# Patient Record
Sex: FEMALE | Race: BLACK OR AFRICAN AMERICAN | Employment: FULL TIME | URBAN - METROPOLITAN AREA
[De-identification: names, ages, dates, MRNs, and addresses within clinical notes are randomized per-mention and may not be internally consistent; named-entity substitution may affect disease eponyms.]

---

## 2017-11-26 ENCOUNTER — HOSPITAL ENCOUNTER (EMERGENCY)
Age: 37
Discharge: HOME | End: 2017-11-26
Attending: EMERGENCY MEDICINE
Payer: SELF-PAY

## 2017-11-26 ENCOUNTER — APPOINTMENT (OUTPATIENT)
Dept: CT IMAGING | Age: 37
End: 2017-11-26
Attending: EMERGENCY MEDICINE
Payer: SELF-PAY

## 2017-11-26 ENCOUNTER — APPOINTMENT (OUTPATIENT)
Dept: GENERAL RADIOLOGY | Age: 37
End: 2017-11-26
Attending: EMERGENCY MEDICINE
Payer: SELF-PAY

## 2017-11-26 VITALS
HEIGHT: 61 IN | WEIGHT: 195.33 LBS | DIASTOLIC BLOOD PRESSURE: 90 MMHG | HEART RATE: 63 BPM | RESPIRATION RATE: 15 BRPM | BODY MASS INDEX: 36.88 KG/M2 | OXYGEN SATURATION: 99 % | SYSTOLIC BLOOD PRESSURE: 149 MMHG | TEMPERATURE: 98.1 F

## 2017-11-26 DIAGNOSIS — I10 ESSENTIAL HYPERTENSION: Primary | ICD-10-CM

## 2017-11-26 DIAGNOSIS — R51.9 ACUTE NONINTRACTABLE HEADACHE, UNSPECIFIED HEADACHE TYPE: ICD-10-CM

## 2017-11-26 LAB
ALBUMIN SERPL-MCNC: 3.4 G/DL (ref 3.5–5)
ALBUMIN/GLOB SERPL: 0.9 {RATIO} (ref 1.1–2.2)
ALP SERPL-CCNC: 65 U/L (ref 45–117)
ALT SERPL-CCNC: 23 U/L (ref 12–78)
ANION GAP SERPL CALC-SCNC: 6 MMOL/L (ref 5–15)
AST SERPL-CCNC: 21 U/L (ref 15–37)
BASOPHILS # BLD: 0 K/UL (ref 0–0.1)
BASOPHILS NFR BLD: 0 % (ref 0–1)
BILIRUB SERPL-MCNC: 0.3 MG/DL (ref 0.2–1)
BUN SERPL-MCNC: 11 MG/DL (ref 6–20)
BUN/CREAT SERPL: 13 (ref 12–20)
CALCIUM SERPL-MCNC: 8.5 MG/DL (ref 8.5–10.1)
CHLORIDE SERPL-SCNC: 106 MMOL/L (ref 97–108)
CK SERPL-CCNC: 142 U/L (ref 26–192)
CO2 SERPL-SCNC: 29 MMOL/L (ref 21–32)
CREAT SERPL-MCNC: 0.83 MG/DL (ref 0.55–1.02)
EOSINOPHIL # BLD: 0.2 K/UL (ref 0–0.4)
EOSINOPHIL NFR BLD: 2 % (ref 0–7)
ERYTHROCYTE [DISTWIDTH] IN BLOOD BY AUTOMATED COUNT: 13.8 % (ref 11.5–14.5)
GLOBULIN SER CALC-MCNC: 3.7 G/DL (ref 2–4)
GLUCOSE SERPL-MCNC: 91 MG/DL (ref 65–100)
HCT VFR BLD AUTO: 37.1 % (ref 35–47)
HGB BLD-MCNC: 11.9 G/DL (ref 11.5–16)
LYMPHOCYTES # BLD: 2.1 K/UL (ref 0.8–3.5)
LYMPHOCYTES NFR BLD: 29 % (ref 12–49)
MCH RBC QN AUTO: 27.9 PG (ref 26–34)
MCHC RBC AUTO-ENTMCNC: 32.1 G/DL (ref 30–36.5)
MCV RBC AUTO: 87.1 FL (ref 80–99)
MONOCYTES # BLD: 0.5 K/UL (ref 0–1)
MONOCYTES NFR BLD: 6 % (ref 5–13)
NEUTS SEG # BLD: 4.5 K/UL (ref 1.8–8)
NEUTS SEG NFR BLD: 63 % (ref 32–75)
PLATELET # BLD AUTO: 265 K/UL (ref 150–400)
POTASSIUM SERPL-SCNC: 3.4 MMOL/L (ref 3.5–5.1)
PROT SERPL-MCNC: 7.1 G/DL (ref 6.4–8.2)
RBC # BLD AUTO: 4.26 M/UL (ref 3.8–5.2)
SODIUM SERPL-SCNC: 141 MMOL/L (ref 136–145)
TROPONIN I SERPL-MCNC: <0.04 NG/ML
WBC # BLD AUTO: 7.2 K/UL (ref 3.6–11)

## 2017-11-26 PROCEDURE — 96375 TX/PRO/DX INJ NEW DRUG ADDON: CPT

## 2017-11-26 PROCEDURE — 96374 THER/PROPH/DIAG INJ IV PUSH: CPT

## 2017-11-26 PROCEDURE — 74011250637 HC RX REV CODE- 250/637: Performed by: EMERGENCY MEDICINE

## 2017-11-26 PROCEDURE — 99284 EMERGENCY DEPT VISIT MOD MDM: CPT

## 2017-11-26 PROCEDURE — 93005 ELECTROCARDIOGRAM TRACING: CPT

## 2017-11-26 PROCEDURE — 36415 COLL VENOUS BLD VENIPUNCTURE: CPT | Performed by: EMERGENCY MEDICINE

## 2017-11-26 PROCEDURE — 80053 COMPREHEN METABOLIC PANEL: CPT | Performed by: EMERGENCY MEDICINE

## 2017-11-26 PROCEDURE — 84484 ASSAY OF TROPONIN QUANT: CPT | Performed by: EMERGENCY MEDICINE

## 2017-11-26 PROCEDURE — 71020 XR CHEST PA LAT: CPT

## 2017-11-26 PROCEDURE — 85025 COMPLETE CBC W/AUTO DIFF WBC: CPT | Performed by: EMERGENCY MEDICINE

## 2017-11-26 PROCEDURE — 82550 ASSAY OF CK (CPK): CPT | Performed by: EMERGENCY MEDICINE

## 2017-11-26 PROCEDURE — 70450 CT HEAD/BRAIN W/O DYE: CPT

## 2017-11-26 PROCEDURE — 74011250636 HC RX REV CODE- 250/636: Performed by: EMERGENCY MEDICINE

## 2017-11-26 RX ORDER — LISINOPRIL 10 MG/1
10 TABLET ORAL DAILY
Qty: 30 TAB | Refills: 0 | Status: SHIPPED | OUTPATIENT
Start: 2017-11-26 | End: 2017-12-26

## 2017-11-26 RX ORDER — METOCLOPRAMIDE HYDROCHLORIDE 5 MG/ML
10 INJECTION INTRAMUSCULAR; INTRAVENOUS
Status: COMPLETED | OUTPATIENT
Start: 2017-11-26 | End: 2017-11-26

## 2017-11-26 RX ORDER — KETOROLAC TROMETHAMINE 30 MG/ML
30 INJECTION, SOLUTION INTRAMUSCULAR; INTRAVENOUS
Status: COMPLETED | OUTPATIENT
Start: 2017-11-26 | End: 2017-11-26

## 2017-11-26 RX ORDER — LISINOPRIL 5 MG/1
10 TABLET ORAL
Status: COMPLETED | OUTPATIENT
Start: 2017-11-26 | End: 2017-11-26

## 2017-11-26 RX ADMIN — KETOROLAC TROMETHAMINE 30 MG: 30 INJECTION, SOLUTION INTRAMUSCULAR at 10:31

## 2017-11-26 RX ADMIN — METOCLOPRAMIDE 10 MG: 5 INJECTION, SOLUTION INTRAMUSCULAR; INTRAVENOUS at 10:31

## 2017-11-26 RX ADMIN — LISINOPRIL 10 MG: 5 TABLET ORAL at 12:41

## 2017-11-26 NOTE — ED PROVIDER NOTES
EMERGENCY DEPARTMENT HISTORY AND PHYSICAL EXAM      Date: 11/26/2017  Patient Name: Yany To    History of Presenting Illness     Chief Complaint   Patient presents with    Hypertension     History Provided By: Patient    HPI: Yany To, 40 y.o. female with PMHx significant for asthma and HTN, presents ambulatory to the ED with cc of elevated blood pressure x 1 week with associated symptoms of dizziness and postocular HA x 2 days as well as right eye blurred vision x 1 hour. Pt states that she has a hx of HTN, noting she followed up with her PCP on 11/20/17 for a routine follow up regarding her asthma hx at which time her PCP took her off Metoprolol and increased her dose of Amlodipine. Since then, pt states that she believes her blood pressure has been constantly elevated as she has been experiencing waxing and waning dizziness and a right postocular HA which she states is typical when her blood pressure is elevated. Additionally, she reports new onset right eye blurriness (no left eye blurriness) for which she was concerned, prompting ED visit. She notes additional symptom of SOB at baseline due to hx of asthma, noting she recently changed inhalers. She denies any hx of migraine HA's. She denies any black curtain vision changes, CP, congestion, sinus pressure, cough, or N/V/D.     PCP: Juanis Jang    Current Outpatient Prescriptions   Medication Sig Dispense Refill    lisinopril (PRINIVIL) 10 mg tablet Take 1 Tab by mouth daily for 30 days. 30 Tab 0    clindamycin (CLEOCIN) 300 mg capsule Take 1 Cap by mouth four (4) times daily. 40 Cap 0    predniSONE (STERAPRED DS) 10 mg dose pack Take as directed on taper package 21 Tab 0    lidocaine (LIDOCAINE VISCOUS) 2 % solution 5mL swish and gargle 4-6 times daily as needed x 3 days 1 Bottle 0    hydrochlorothiazide (HYDRODIURIL) 25 mg tablet Take 25 mg by mouth daily.       ranitidine (ZANTAC) 150 mg tablet Take 150 mg by mouth two (2) times a day. Past History     Past Medical History:  Past Medical History:   Diagnosis Date    Asthma     Hypertension        Past Surgical History:  Past Surgical History:   Procedure Laterality Date    HX GYN      ovarian cyst       Family History:  History reviewed. No pertinent family history. Social History:  Social History   Substance Use Topics    Smoking status: Current Every Day Smoker     Packs/day: 0.50    Smokeless tobacco: None    Alcohol use Yes      Comment: socially       Allergies: Allergies   Allergen Reactions    Percocet [Oxycodone-Acetaminophen] Itching    Sulfa (Sulfonamide Antibiotics) Itching    Vicodin [Hydrocodone-Acetaminophen] Itching         Review of Systems   Review of Systems   Constitutional: Negative. Negative for appetite change, chills, fatigue and fever. HENT: Negative. Negative for congestion, rhinorrhea, sinus pressure and sore throat. Eyes: Positive for visual disturbance. Respiratory: Positive for shortness of breath (baseline). Negative for cough, choking, chest tightness and wheezing. Cardiovascular: Negative for chest pain, palpitations and leg swelling. Positive for elevated blood pressure. Gastrointestinal: Negative for abdominal pain, constipation, diarrhea, nausea and vomiting. Endocrine: Negative. Genitourinary: Negative. Negative for difficulty urinating, dysuria, flank pain and urgency. Musculoskeletal: Negative. Skin: Negative. Neurological: Positive for dizziness and headaches (postocular). Negative for speech difficulty, weakness, light-headedness and numbness. Psychiatric/Behavioral: Negative. All other systems reviewed and are negative. Physical Exam   Physical Exam   Constitutional: She is oriented to person, place, and time. She appears well-developed and well-nourished. No distress. HENT:   Head: Normocephalic and atraumatic.    Mouth/Throat: Oropharynx is clear and moist. No oropharyngeal exudate. Eyes: Conjunctivae and EOM are normal. Pupils are equal, round, and reactive to light. Neck: Normal range of motion. Neck supple. No JVD present. No tracheal deviation present. Cardiovascular: Normal rate, regular rhythm, normal heart sounds and intact distal pulses. No murmur heard. Pulmonary/Chest: Effort normal and breath sounds normal. No stridor. No respiratory distress. She has no wheezes. She has no rales. She exhibits no tenderness. Abdominal: Soft. She exhibits no distension. There is no tenderness. There is no rebound and no guarding. Musculoskeletal: Normal range of motion. She exhibits no edema or tenderness. Neurological: She is alert and oriented to person, place, and time. No cranial nerve deficit. No focal motor or sensory deficits, no dysmetria, normal heel shin    Skin: Skin is warm and dry. She is not diaphoretic. Psychiatric: She has a normal mood and affect. Her behavior is normal.   Nursing note and vitals reviewed.         Diagnostic Study Results     Labs -  Recent Results (from the past 12 hour(s))   EKG, 12 LEAD, INITIAL    Collection Time: 11/26/17  9:42 AM   Result Value Ref Range    Ventricular Rate 60 BPM    Atrial Rate 60 BPM    P-R Interval 186 ms    QRS Duration 88 ms    Q-T Interval 430 ms    QTC Calculation (Bezet) 430 ms    Calculated P Axis 23 degrees    Calculated R Axis 92 degrees    Calculated T Axis 32 degrees    Diagnosis       Normal sinus rhythm  Rightward axis  Nonspecific T wave abnormality  Abnormal ECG     CBC WITH AUTOMATED DIFF    Collection Time: 11/26/17 10:12 AM   Result Value Ref Range    WBC 7.2 3.6 - 11.0 K/uL    RBC 4.26 3.80 - 5.20 M/uL    HGB 11.9 11.5 - 16.0 g/dL    HCT 37.1 35.0 - 47.0 %    MCV 87.1 80.0 - 99.0 FL    MCH 27.9 26.0 - 34.0 PG    MCHC 32.1 30.0 - 36.5 g/dL    RDW 13.8 11.5 - 14.5 %    PLATELET 720 331 - 192 K/uL    NEUTROPHILS 63 32 - 75 %    LYMPHOCYTES 29 12 - 49 %    MONOCYTES 6 5 - 13 %    EOSINOPHILS 2 0 - 7 % BASOPHILS 0 0 - 1 %    ABS. NEUTROPHILS 4.5 1.8 - 8.0 K/UL    ABS. LYMPHOCYTES 2.1 0.8 - 3.5 K/UL    ABS. MONOCYTES 0.5 0.0 - 1.0 K/UL    ABS. EOSINOPHILS 0.2 0.0 - 0.4 K/UL    ABS. BASOPHILS 0.0 0.0 - 0.1 K/UL   CK W/ REFLX CKMB    Collection Time: 11/26/17 10:12 AM   Result Value Ref Range     26 - 192 U/L   TROPONIN I    Collection Time: 11/26/17 10:12 AM   Result Value Ref Range    Troponin-I, Qt. <0.04 <5.58 ng/mL   METABOLIC PANEL, COMPREHENSIVE    Collection Time: 11/26/17 10:12 AM   Result Value Ref Range    Sodium 141 136 - 145 mmol/L    Potassium 3.4 (L) 3.5 - 5.1 mmol/L    Chloride 106 97 - 108 mmol/L    CO2 29 21 - 32 mmol/L    Anion gap 6 5 - 15 mmol/L    Glucose 91 65 - 100 mg/dL    BUN 11 6 - 20 MG/DL    Creatinine 0.83 0.55 - 1.02 MG/DL    BUN/Creatinine ratio 13 12 - 20      GFR est AA >60 >60 ml/min/1.73m2    GFR est non-AA >60 >60 ml/min/1.73m2    Calcium 8.5 8.5 - 10.1 MG/DL    Bilirubin, total 0.3 0.2 - 1.0 MG/DL    ALT (SGPT) 23 12 - 78 U/L    AST (SGOT) 21 15 - 37 U/L    Alk. phosphatase 65 45 - 117 U/L    Protein, total 7.1 6.4 - 8.2 g/dL    Albumin 3.4 (L) 3.5 - 5.0 g/dL    Globulin 3.7 2.0 - 4.0 g/dL    A-G Ratio 0.9 (L) 1.1 - 2.2         Radiologic Studies -   CT Results  (Last 48 hours)               11/26/17 1044  CT HEAD WO CONT Final result    Impression:  IMPRESSION: No acute intracranial hemorrhage, mass or infarct. Narrative:  INDICATION:   Headache, dizziness. Exam: Noncontrast CT of the brain is performed with 5 mm collimation. CT dose reduction was achieved with the use of the standardized protocol   tailored for this examination and automatic exposure control for dose   modulation. Direct comparison is made to prior CT dated January 2012. FINDINGS: There is no acute intracranial hemorrhage, mass, mass effect or   herniation. Ventricular system is normal. The gray-white matter differentiation   is well-preserved.  The mastoid air cells are well pneumatized. The visualized   paranasal sinuses are normal.               CXR Results  (Last 48 hours)               11/26/17 1027  XR CHEST PA LAT Final result    Impression:  IMPRESSION: No acute cardiopulmonary disease. Narrative: Indication: Hypertension. Exam: PA and lateral views of the chest.       There is no prior study for direct comparison. Findings: Cardiomediastinal silhouette is within normal limits. Lungs are clear   bilaterally. Pleural spaces are normal. Osseous structures are intact. Medical Decision Making   I am the first provider for this patient. I reviewed the vital signs, available nursing notes, past medical history, past surgical history, family history and social history. Vital Signs-Reviewed the patient's vital signs. Patient Vitals for the past 12 hrs:   Temp Pulse Resp BP SpO2   11/26/17 1200 - 63 15 149/90 99 %   11/26/17 1120 - 88 24 138/86 97 %   11/26/17 1026 - - - (!) 175/100 100 %   11/26/17 0922 98.1 °F (36.7 °C) 80 22 (!) 179/129 -       Pulse Oximetry Analysis - 98% on RA    Cardiac Monitor:   Rate: 65 bpm  Rhythm: Normal Sinus Rhythm     Records Reviewed: Nursing Notes and Old Medical Records    Provider Notes (Medical Decision Making):   DDx: accelerated HTN, head bleed, electrolyte abnormality, renal failure, ACS    ED Course:   12:23 PM  I have reviewed discharge instructions with the patient and explained medications that she is being discharged with. The patient verbalized understanding and agrees with plan. EKG: NSR, right hope axis, rate 60, normal pr/qrs, NSST, Rampart Carson, DO    Disposition:  Discharge Note:  12:23 PM  The patient has been re-evaluated and is ready for discharge. Reviewed available results with patient. Counseled patient on diagnosis and care plan. Patient has expressed understanding, and all questions have been answered.  Patient agrees with plan and agrees to follow up as recommended, or return to the ED if their symptoms worsen. Discharge instructions have been provided and explained to the patient, along with reasons to return to the ED. Pt BP slightly elevated. Pt HR in 60's, hesitant to restart BB, will give pt dose of Lisinopril here in the ED, pt to monitor BP next 24hrs, script written for Lisinopril if BP improves and does not drop to low, Bc Blanco DO      PLAN:  1. Current Discharge Medication List      START taking these medications    Details   lisinopril (PRINIVIL) 10 mg tablet Take 1 Tab by mouth daily for 30 days. Qty: 30 Tab, Refills: 0         STOP taking these medications       metoprolol succinate (TOPROL-XL) 25 mg XL tablet Comments:   Reason for Stoppin.   Follow-up Information     Follow up With Details Comments [address]  As needed Junior Guillen  [phone]          Return to ED if worse     Diagnosis     Clinical Impression:   1. Essential hypertension    2. Acute nonintractable headache, unspecified headache type        Attestations: This note is prepared by Serafin Gray, acting as Scribe for Marlon Calderon, [address] [place] [address]: The scribe's documentation has been prepared under my direction and personally reviewed by me in its entirety. I confirm that the note above accurately reflects all work, treatment, procedures, and medical decision making performed by me.

## 2017-11-26 NOTE — ED NOTES
Assumed care of patient. Patient placed in position of comfort. Call bell in reach. Skin warm and dry. Respirations even and unlabored. In no apparent distress at this time. Pt presents ambulatory into the ED with c/o HTN x about 1 week, dizziness x 2 days, blurred vision in Rt eye and headache x this am. Seen at her PCP'c office early last week for a routine visit; was found to be hypertensive and states, \"they took me off Metoprolol and increased my Amlodipine dose. \" Came into the ED today d/t headache and blurred vision in Rt eye x 1 hour pta. Denies CP,slurred speech and extremity weakness. A&O x 4 and interacting appropriately. Placed on cardiac monitor x 3.

## 2017-11-26 NOTE — ED NOTES
Dr. Yan Llamas &CHAGO have reviewed discharge instructions with the patient. The patient verbalized understanding. Pt. Refused wheelchair out. Pt. Ambulatory out of ED with steady gait.

## 2017-11-26 NOTE — ED NOTES
Bedside and Verbal shift change report given to Hernan Ruffin RN (oncoming nurse) by this RN (offgoing nurse). Report included the following information SBAR.

## 2017-11-26 NOTE — DISCHARGE INSTRUCTIONS
Headache: Care Instructions  Your Care Instructions    Headaches have many possible causes. Most headaches aren't a sign of a more serious problem, and they will get better on their own. Home treatment may help you feel better faster. The doctor has checked you carefully, but problems can develop later. If you notice any problems or new symptoms, get medical treatment right away. Follow-up care is a key part of your treatment and safety. Be sure to make and go to all appointments, and call your doctor if you are having problems. It's also a good idea to know your test results and keep a list of the medicines you take. How can you care for yourself at home? · Do not drive if you have taken a prescription pain medicine. · Rest in a quiet, dark room until your headache is gone. Close your eyes and try to relax or go to sleep. Don't watch TV or read. · Put a cold, moist cloth or cold pack on the painful area for 10 to 20 minutes at a time. Put a thin cloth between the cold pack and your skin. · Use a warm, moist towel or a heating pad set on low to relax tight shoulder and neck muscles. · Have someone gently massage your neck and shoulders. · Take pain medicines exactly as directed. ¨ If the doctor gave you a prescription medicine for pain, take it as prescribed. ¨ If you are not taking a prescription pain medicine, ask your doctor if you can take an over-the-counter medicine. · Be careful not to take pain medicine more often than the instructions allow, because you may get worse or more frequent headaches when the medicine wears off. · Do not ignore new symptoms that occur with a headache, such as a fever, weakness or numbness, vision changes, or confusion. These may be signs of a more serious problem. To prevent headaches  · Keep a headache diary so you can figure out what triggers your headaches. Avoiding triggers may help you prevent headaches.  Record when each headache began, how long it lasted, and what the pain was like (throbbing, aching, stabbing, or dull). Write down any other symptoms you had with the headache, such as nausea, flashing lights or dark spots, or sensitivity to bright light or loud noise. Note if the headache occurred near your period. List anything that might have triggered the headache, such as certain foods (chocolate, cheese, wine) or odors, smoke, bright light, stress, or lack of sleep. · Find healthy ways to deal with stress. Headaches are most common during or right after stressful times. Take time to relax before and after you do something that has caused a headache in the past.  · Try to keep your muscles relaxed by keeping good posture. Check your jaw, face, neck, and shoulder muscles for tension, and try relaxing them. When sitting at a desk, change positions often, and stretch for 30 seconds each hour. · Get plenty of sleep and exercise. · Eat regularly and well. Long periods without food can trigger a headache. · Treat yourself to a massage. Some people find that regular massages are very helpful in relieving tension. · Limit caffeine by not drinking too much coffee, tea, or soda. But don't quit caffeine suddenly, because that can also give you headaches. · Reduce eyestrain from computers by blinking frequently and looking away from the computer screen every so often. Make sure you have proper eyewear and that your monitor is set up properly, about an arm's length away. · Seek help if you have depression or anxiety. Your headaches may be linked to these conditions. Treatment can both prevent headaches and help with symptoms of anxiety or depression. When should you call for help? Call 911 anytime you think you may need emergency care. For example, call if:  ? · You have signs of a stroke. These may include:  ¨ Sudden numbness, paralysis, or weakness in your face, arm, or leg, especially on only one side of your body. ¨ Sudden vision changes.   ¨ Sudden trouble speaking. ¨ Sudden confusion or trouble understanding simple statements. ¨ Sudden problems with walking or balance. ¨ A sudden, severe headache that is different from past headaches. ?Call your doctor now or seek immediate medical care if:  ? · You have a new or worse headache. ? · Your headache gets much worse. Where can you learn more? Go to http://rosemary-marisela.info/. Enter M271 in the search box to learn more about \"Headache: Care Instructions. \"  Current as of: October 14, 2016  Content Version: 11.4  © 3836-3980 [employer]. Care instructions adapted under license by [employer] (which disclaims liability or warranty for this information). If you have questions about a medical condition or this instruction, always ask your healthcare professional. [address] any warranty or liability for your use of this information. High Blood Pressure: Care Instructions  Your Care Instructions    If your blood pressure is usually above 140/90, you have high blood pressure, or hypertension. That means the top number is 140 or higher or the bottom number is 90 or higher, or both. Despite what a lot of people think, high blood pressure usually doesn't cause headaches or make you feel dizzy or lightheaded. It usually has no symptoms. But it does increase your risk for heart attack, stroke, and kidney or eye damage. The higher your blood pressure, the more your risk increases. Your doctor will give you a goal for your blood pressure. Your goal will be based on your health and your age. An example of a goal is to keep your blood pressure below 140/90. Lifestyle changes, such as eating healthy and being active, are always important to help lower blood pressure. You might also take medicine to reach your blood pressure goal.  Follow-up care is a key part of your treatment and safety.  Be sure to make and go to all appointments, and call your doctor if you are having problems. It's also a good idea to know your test results and keep a list of the medicines you take. How can you care for yourself at home? Medical treatment  · If you stop taking your medicine, your blood pressure will go back up. You may take one or more types of medicine to lower your blood pressure. Be safe with medicines. Take your medicine exactly as prescribed. Call your doctor if you think you are having a problem with your medicine. · Talk to your doctor before you start taking aspirin every day. Aspirin can help certain people lower their risk of a heart attack or stroke. But taking aspirin isn't right for everyone, because it can cause serious bleeding. · See your doctor regularly. You may need to see the doctor more often at first or until your blood pressure comes down. · If you are taking blood pressure medicine, talk to your doctor before you take decongestants or anti-inflammatory medicine, such as ibuprofen. Some of these medicines can raise blood pressure. · Learn how to check your blood pressure at home. Lifestyle changes  · Stay at a healthy weight. This is especially important if you put on weight around the waist. Losing even 10 pounds can help you lower your blood pressure. · If your doctor recommends it, get more exercise. Walking is a good choice. Bit by bit, increase the amount you walk every day. Try for at least 30 minutes on most days of the week. You also may want to swim, bike, or do other activities. · Avoid or limit alcohol. Talk to your doctor about whether you can drink any alcohol. · Try to limit how much sodium you eat to less than 2,300 milligrams (mg) a day. Your doctor may ask you to try to eat less than 1,500 mg a day. · Eat plenty of fruits (such as bananas and oranges), vegetables, legumes, whole grains, and low-fat dairy products. · Lower the amount of saturated fat in your diet.  Saturated fat is found in animal products such as milk, cheese, and meat. Limiting these foods may help you lose weight and also lower your risk for heart disease. · Do not smoke. Smoking increases your risk for heart attack and stroke. If you need help quitting, talk to your doctor about stop-smoking programs and medicines. These can increase your chances of quitting for good. When should you call for help? Call 911 anytime you think you may need emergency care. This may mean having symptoms that suggest that your blood pressure is causing a serious heart or blood vessel problem. Your blood pressure may be over 180/110. ? For example, call 911 if:  ? · You have symptoms of a heart attack. These may include:  ¨ Chest pain or pressure, or a strange feeling in the chest.  ¨ Sweating. ¨ Shortness of breath. ¨ Nausea or vomiting. ¨ Pain, pressure, or a strange feeling in the back, neck, jaw, or upper belly or in one or both shoulders or arms. ¨ Lightheadedness or sudden weakness. ¨ A fast or irregular heartbeat. ? · You have symptoms of a stroke. These may include:  ¨ Sudden numbness, tingling, weakness, or loss of movement in your face, arm, or leg, especially on only one side of your body. ¨ Sudden vision changes. ¨ Sudden trouble speaking. ¨ Sudden confusion or trouble understanding simple statements. ¨ Sudden problems with walking or balance. ¨ A sudden, severe headache that is different from past headaches. ? · You have severe back or belly pain. ?Do not wait until your blood pressure comes down on its own. Get help right away. ?Call your doctor now or seek immediate care if:  ? · Your blood pressure is much higher than normal (such as 180/110 or higher), but you don't have symptoms. ? · You think high blood pressure is causing symptoms, such as:  ¨ Severe headache. ¨ Blurry vision. ? Watch closely for changes in your health, and be sure to contact your doctor if:  ? · Your blood pressure measures 140/90 or higher at least 2 times.  That means the top number is 140 or higher or the bottom number is 90 or higher, or both. ? · You think you may be having side effects from your blood pressure medicine. ? · Your blood pressure is usually normal, but it goes above normal at least 2 times. Where can you learn more? Go to http://rosemary-marisela.info/. Enter V661 in the search box to learn more about \"High Blood Pressure: Care Instructions. \"  Current as of: September 21, 2016  Content Version: 11.4  © 8891-0801 [employer]. Care instructions adapted under license by [employer] (which disclaims liability or warranty for this information). If you have questions about a medical condition or this instruction, always ask your healthcare professional. [address] any warranty or liability for your use of this information.

## 2017-11-26 NOTE — ED NOTES
Encouraged pt. To follow up with PCP by calling tomorrow for an appointment. Education on HTN and causes and ways to monitor b/p. Pt. Verbalized understanding.

## 2017-11-27 LAB
ATRIAL RATE: 60 BPM
CALCULATED P AXIS, ECG09: 23 DEGREES
CALCULATED R AXIS, ECG10: 92 DEGREES
CALCULATED T AXIS, ECG11: 32 DEGREES
DIAGNOSIS, 93000: NORMAL
P-R INTERVAL, ECG05: 186 MS
Q-T INTERVAL, ECG07: 430 MS
QRS DURATION, ECG06: 88 MS
QTC CALCULATION (BEZET), ECG08: 430 MS
VENTRICULAR RATE, ECG03: 60 BPM

## 2018-03-04 ENCOUNTER — APPOINTMENT (OUTPATIENT)
Dept: CT IMAGING | Age: 38
End: 2018-03-04
Attending: EMERGENCY MEDICINE
Payer: SELF-PAY

## 2018-03-04 ENCOUNTER — HOSPITAL ENCOUNTER (EMERGENCY)
Age: 38
Discharge: HOME | End: 2018-03-05
Attending: EMERGENCY MEDICINE | Admitting: EMERGENCY MEDICINE
Payer: SELF-PAY

## 2018-03-04 VITALS
HEIGHT: 61 IN | HEART RATE: 81 BPM | BODY MASS INDEX: 37.21 KG/M2 | RESPIRATION RATE: 19 BRPM | OXYGEN SATURATION: 94 % | TEMPERATURE: 98.2 F | DIASTOLIC BLOOD PRESSURE: 88 MMHG | SYSTOLIC BLOOD PRESSURE: 148 MMHG | WEIGHT: 197.09 LBS

## 2018-03-04 DIAGNOSIS — R51.9 FRONTAL HEADACHE: Primary | ICD-10-CM

## 2018-03-04 DIAGNOSIS — R09.81 SINUS CONGESTION: ICD-10-CM

## 2018-03-04 LAB
APPEARANCE UR: CLEAR
BACTERIA URNS QL MICRO: NEGATIVE /HPF
BASOPHILS # BLD: 0 K/UL (ref 0–0.1)
BASOPHILS NFR BLD: 0 % (ref 0–1)
BILIRUB UR QL: NEGATIVE
CK MB CFR SERPL CALC: 0.9 % (ref 0–2.5)
CK MB SERPL-MCNC: 1.3 NG/ML (ref 5–25)
CK SERPL-CCNC: 142 U/L (ref 26–192)
COLOR UR: NORMAL
DIFFERENTIAL METHOD BLD: ABNORMAL
EOSINOPHIL # BLD: 0.4 K/UL (ref 0–0.4)
EOSINOPHIL NFR BLD: 4 % (ref 0–7)
EPITH CASTS URNS QL MICRO: NORMAL /LPF
ERYTHROCYTE [DISTWIDTH] IN BLOOD BY AUTOMATED COUNT: 13.8 % (ref 11.5–14.5)
ERYTHROCYTE [SEDIMENTATION RATE] IN BLOOD: 17 MM/HR (ref 0–20)
GLUCOSE UR STRIP.AUTO-MCNC: NEGATIVE MG/DL
HCT VFR BLD AUTO: 37.7 % (ref 35–47)
HGB BLD-MCNC: 11.8 G/DL (ref 11.5–16)
HGB UR QL STRIP: NEGATIVE
HYALINE CASTS URNS QL MICRO: NORMAL /LPF (ref 0–5)
IMM GRANULOCYTES # BLD: 0 K/UL (ref 0–0.04)
IMM GRANULOCYTES NFR BLD AUTO: 0 % (ref 0–0.5)
KETONES UR QL STRIP.AUTO: NEGATIVE MG/DL
LEUKOCYTE ESTERASE UR QL STRIP.AUTO: NEGATIVE
LYMPHOCYTES # BLD: 2.3 K/UL (ref 0.8–3.5)
LYMPHOCYTES NFR BLD: 25 % (ref 12–49)
MCH RBC QN AUTO: 27.2 PG (ref 26–34)
MCHC RBC AUTO-ENTMCNC: 31.3 G/DL (ref 30–36.5)
MCV RBC AUTO: 86.9 FL (ref 80–99)
MONOCYTES # BLD: 0.4 K/UL (ref 0–1)
MONOCYTES NFR BLD: 4 % (ref 5–13)
NEUTS SEG # BLD: 6.3 K/UL (ref 1.8–8)
NEUTS SEG NFR BLD: 67 % (ref 32–75)
NITRITE UR QL STRIP.AUTO: NEGATIVE
NRBC # BLD: 0 K/UL (ref 0–0.01)
NRBC BLD-RTO: 0 PER 100 WBC
PH UR STRIP: 6.5 [PH] (ref 5–8)
PLATELET # BLD AUTO: 271 K/UL (ref 150–400)
PMV BLD AUTO: 10.5 FL (ref 8.9–12.9)
PROT UR STRIP-MCNC: NEGATIVE MG/DL
RBC # BLD AUTO: 4.34 M/UL (ref 3.8–5.2)
RBC #/AREA URNS HPF: NORMAL /HPF (ref 0–5)
SP GR UR REFRACTOMETRY: 1.02 (ref 1–1.03)
UA: UC IF INDICATED,UAUC: NORMAL
UROBILINOGEN UR QL STRIP.AUTO: 0.2 EU/DL (ref 0.2–1)
WBC # BLD AUTO: 9.5 K/UL (ref 3.6–11)
WBC URNS QL MICRO: NORMAL /HPF (ref 0–4)

## 2018-03-04 PROCEDURE — 82550 ASSAY OF CK (CPK): CPT | Performed by: EMERGENCY MEDICINE

## 2018-03-04 PROCEDURE — 99285 EMERGENCY DEPT VISIT HI MDM: CPT

## 2018-03-04 PROCEDURE — 74011250637 HC RX REV CODE- 250/637: Performed by: EMERGENCY MEDICINE

## 2018-03-04 PROCEDURE — 81001 URINALYSIS AUTO W/SCOPE: CPT | Performed by: EMERGENCY MEDICINE

## 2018-03-04 PROCEDURE — 96375 TX/PRO/DX INJ NEW DRUG ADDON: CPT

## 2018-03-04 PROCEDURE — 70450 CT HEAD/BRAIN W/O DYE: CPT

## 2018-03-04 PROCEDURE — 85025 COMPLETE CBC W/AUTO DIFF WBC: CPT | Performed by: EMERGENCY MEDICINE

## 2018-03-04 PROCEDURE — 36415 COLL VENOUS BLD VENIPUNCTURE: CPT | Performed by: EMERGENCY MEDICINE

## 2018-03-04 PROCEDURE — 96374 THER/PROPH/DIAG INJ IV PUSH: CPT

## 2018-03-04 PROCEDURE — 85652 RBC SED RATE AUTOMATED: CPT | Performed by: EMERGENCY MEDICINE

## 2018-03-04 PROCEDURE — 74011250636 HC RX REV CODE- 250/636: Performed by: EMERGENCY MEDICINE

## 2018-03-04 RX ORDER — LISINOPRIL 20 MG/1
TABLET ORAL DAILY
COMMUNITY
End: 2019-02-05

## 2018-03-04 RX ORDER — FEXOFENADINE HCL AND PSEUDOEPHEDRINE HCI 60; 120 MG/1; MG/1
1 TABLET, EXTENDED RELEASE ORAL EVERY 12 HOURS
Qty: 10 TAB | Refills: 0 | Status: SHIPPED | OUTPATIENT
Start: 2018-03-04 | End: 2019-01-04

## 2018-03-04 RX ORDER — KETOROLAC TROMETHAMINE 30 MG/ML
15 INJECTION, SOLUTION INTRAMUSCULAR; INTRAVENOUS
Status: COMPLETED | OUTPATIENT
Start: 2018-03-04 | End: 2018-03-04

## 2018-03-04 RX ORDER — BUTALBITAL, ACETAMINOPHEN AND CAFFEINE 50; 325; 40 MG/1; MG/1; MG/1
2 TABLET ORAL
Status: COMPLETED | OUTPATIENT
Start: 2018-03-04 | End: 2018-03-04

## 2018-03-04 RX ORDER — DEXAMETHASONE SODIUM PHOSPHATE 4 MG/ML
10 INJECTION, SOLUTION INTRA-ARTICULAR; INTRALESIONAL; INTRAMUSCULAR; INTRAVENOUS; SOFT TISSUE
Status: COMPLETED | OUTPATIENT
Start: 2018-03-04 | End: 2018-03-04

## 2018-03-04 RX ORDER — BUTALBITAL, ACETAMINOPHEN AND CAFFEINE 300; 40; 50 MG/1; MG/1; MG/1
1 CAPSULE ORAL
Qty: 10 CAP | Refills: 0 | Status: SHIPPED | OUTPATIENT
Start: 2018-03-04 | End: 2019-01-04

## 2018-03-04 RX ADMIN — SODIUM CHLORIDE 500 ML: 900 INJECTION, SOLUTION INTRAVENOUS at 22:33

## 2018-03-04 RX ADMIN — KETOROLAC TROMETHAMINE 15 MG: 30 INJECTION, SOLUTION INTRAMUSCULAR at 23:11

## 2018-03-04 RX ADMIN — DEXAMETHASONE SODIUM PHOSPHATE 10 MG: 4 INJECTION, SOLUTION INTRAMUSCULAR; INTRAVENOUS at 22:34

## 2018-03-04 RX ADMIN — BUTALBITAL, ACETAMINOPHEN AND CAFFEINE 2 TABLET: 50; 325; 40 TABLET ORAL at 23:52

## 2018-03-04 NOTE — LETTER
[address] 
[facility] EMERGENCY DEPT 
[address] [place] [address] [id number] [phone] Work/School Note Date: 3/4/2018 To Whom It May concern: Debra Terry was seen and treated today in the emergency room by the following provider(s): 
Attending Provider: Priscila James MD. Debra Terry may return to work on 3/6/18.  
 
Sincerely, 
 
 
 
 
Priscila James MD

## 2018-03-05 NOTE — ED NOTES
Assumed care of pt from triage. Pt complaining of headache x3 days. Pt c/o congestion and has been taking mucinex. Pt describes pressure in face/sinuses and ache in head. Pt in no acute distress at this time. MD at bedside. Call bell within reach.

## 2018-03-05 NOTE — DISCHARGE INSTRUCTIONS
Head or Face Pain: Care Instructions  Your Care Instructions    Common causes of head or face pain are allergies, stress, and injuries. Other causes include tooth problems and sinus infections. Eating certain foods, such as chocolate or cheese, or drinking certain liquids, such as coffee or cola, can cause head pain for some people. If you have mild head pain, you may not need treatment. It is important to watch your symptoms and talk to your doctor if your pain continues or gets worse. Follow-up care is a key part of your treatment and safety. Be sure to make and go to all appointments, and call your doctor if you are having problems. It's also a good idea to know your test results and keep a list of the medicines you take. How can you care for yourself at home? · Take pain medicines exactly as directed. ¨ If the doctor gave you a prescription medicine for pain, take it as prescribed. ¨ If you are not taking a prescription pain medicine, ask your doctor if you can take an over-the-counter pain medicine. · Take it easy for the next few days or longer if you are not feeling well. · Use a warm, moist towel or heating pad set on low to relax tight muscles in your shoulder and neck. Have someone gently massage your neck and shoulders. · Put ice or a cold pack on the area for 10 to 20 minutes at a time. Put a thin cloth between the ice and your skin. When should you call for help? Call 911 anytime you think you may need emergency care. For example, call if:  ? · You have twitching, jerking, or a seizure. ? · You passed out (lost consciousness). ? · You have symptoms of a stroke. These may include:  ¨ Sudden numbness, tingling, weakness, or loss of movement in your face, arm, or leg, especially on only one side of your body. ¨ Sudden vision changes. ¨ Sudden trouble speaking. ¨ Sudden confusion or trouble understanding simple statements. ¨ Sudden problems with walking or balance.   ¨ A sudden, severe headache that is different from past headaches. ? · You have jaw pain and pain in your chest, shoulder, neck, or arm. ?Call your doctor now or seek immediate medical care if:  ? · You have a fever with a stiff neck or a severe headache. ? · You have nausea and vomiting, or you cannot keep food or liquids down. ? Watch closely for changes in your health, and be sure to contact your doctor if:  ? · Your head or face pain does not get better as expected. Where can you learn more? Go to http://rosemary-marisela.info/. Enter P568 in the search box to learn more about \"Head or Face Pain: Care Instructions. \"  Current as of: March 20, 2017  Content Version: 11.4  © 2890-8352 [employer]. Care instructions adapted under license by [employer] (which disclaims liability or warranty for this information). If you have questions about a medical condition or this instruction, always ask your healthcare professional. [place] [address] any warranty or liability for your use of this information.

## 2018-03-05 NOTE — ED PROVIDER NOTES
EMERGENCY DEPARTMENT HISTORY AND PHYSICAL EXAM      Date: 3/4/2018  Patient Name: Hattie Collins    History of Presenting Illness     Chief Complaint   Patient presents with    Headache     Ambulatory with steady gait to triage. Reporting began getting a headache about 2 days ago. Gradually came on and is now a 10/10 aching anteriorly. Has attempted to treat without relief. Reporting some sensitivity to light. History Provided By: Patient    HPI: Hattie Collins is a 40 y.o. female, pmhx HTN and Asthma, who presents ambulatory to the ED c/o pressuring nasal congestion and headache x3 days. Pt reports associated productive cough with secondary chest pain and photophobia. She also notes her cough exacerbates her headache. Pt reports taking Sinus Mucinex Max Strength with no relief of her sxs. Pt specifically denies any recent fevers, chills, nausea, vomiting, diarrhea, abd pain, SOB, urinary sxs, or changes in BM. PCP: Toribio Mcghee MD    PMHx: Significant for HTN and Asthma  PSHx: Significant for Ovarian cyst  Social Hx: +tobacco (.5PPD), -EtOH, -Illicit Drugs    There are no other complaints, changes, or physical findings at this time. Current Outpatient Prescriptions   Medication Sig Dispense Refill    lisinopril (PRINIVIL, ZESTRIL) 20 mg tablet Take  by mouth daily.  loratadine 10 mg cap Take  by mouth daily.  butalbital-acetaminophen-caff (FIORICET) -40 mg per capsule Take 1 Cap by mouth every six (6) hours as needed for Pain. 10 Cap 0    fexofenadine-pseudoephedrine (ALLEGRA-D 12 HOUR)  mg Tb12 Take 1 Tab by mouth every twelve (12) hours. 10 Tab 0    hydrochlorothiazide (HYDRODIURIL) 25 mg tablet Take 25 mg by mouth daily.  ranitidine (ZANTAC) 150 mg tablet Take 150 mg by mouth two (2) times a day.  clindamycin (CLEOCIN) 300 mg capsule Take 1 Cap by mouth four (4) times daily.  40 Cap 0    predniSONE (STERAPRED DS) 10 mg dose pack Take as directed on taper package 21 Tab 0    lidocaine (LIDOCAINE VISCOUS) 2 % solution 5mL swish and gargle 4-6 times daily as needed x 3 days 1 Bottle 0       Past History     Past Medical History:  Past Medical History:   Diagnosis Date    Asthma     Hypertension        Past Surgical History:  Past Surgical History:   Procedure Laterality Date    HX GYN      ovarian cyst       Family History:  No family history on file. Social History:  Social History   Substance Use Topics    Smoking status: Current Every Day Smoker     Packs/day: 0.50    Smokeless tobacco: Not on file    Alcohol use Yes      Comment: socially       Allergies: Allergies   Allergen Reactions    Amlodipine Other (comments)     Pt states it makes her BP higher    Percocet [Oxycodone-Acetaminophen] Itching    Sulfa (Sulfonamide Antibiotics) Itching    Vicodin [Hydrocodone-Acetaminophen] Itching         Review of Systems   Review of Systems   Constitutional: Negative. Negative for fever. HENT: Positive for congestion. Eyes: Positive for photophobia. Respiratory: Positive for cough. Negative for shortness of breath. Cardiovascular: Positive for chest pain. Gastrointestinal: Negative for abdominal pain, nausea and vomiting. Endocrine: Negative. Genitourinary: Negative. Negative for difficulty urinating, dysuria and hematuria. Musculoskeletal: Negative. Skin: Negative. Allergic/Immunologic: Negative. Neurological: Positive for headaches. Psychiatric/Behavioral: Negative for suicidal ideas. All other systems reviewed and are negative. Physical Exam   Physical Exam   Constitutional: She is oriented to person, place, and time. She appears well-developed and well-nourished. No distress. HENT:   Head: Normocephalic and atraumatic. Nose: Nose normal.   Eyes: Conjunctivae and EOM are normal. No scleral icterus. Neck: Normal range of motion. No tracheal deviation present.    Cardiovascular: Normal rate, regular rhythm, normal heart sounds and intact distal pulses. Exam reveals no friction rub. No murmur heard. Pulmonary/Chest: Effort normal and breath sounds normal. No stridor. No respiratory distress. She has no wheezes. She has no rales. Abdominal: Soft. Bowel sounds are normal. She exhibits no distension. There is no tenderness. There is no rebound. Musculoskeletal: Normal range of motion. She exhibits no tenderness. Neurological: She is alert and oriented to person, place, and time. No cranial nerve deficit. Skin: Skin is warm and dry. No rash noted. She is not diaphoretic. Psychiatric: She has a normal mood and affect. Her speech is normal and behavior is normal. Judgment and thought content normal. Cognition and memory are normal.   Nursing note and vitals reviewed. Diagnostic Study Results     Labs -     Recent Results (from the past 12 hour(s))   CBC WITH AUTOMATED DIFF    Collection Time: 03/04/18 10:12 PM   Result Value Ref Range    WBC 9.5 3.6 - 11.0 K/uL    RBC 4.34 3.80 - 5.20 M/uL    HGB 11.8 11.5 - 16.0 g/dL    HCT 37.7 35.0 - 47.0 %    MCV 86.9 80.0 - 99.0 FL    MCH 27.2 26.0 - 34.0 PG    MCHC 31.3 30.0 - 36.5 g/dL    RDW 13.8 11.5 - 14.5 %    PLATELET 081 826 - 510 K/uL    MPV 10.5 8.9 - 12.9 FL    NRBC 0.0 0  WBC    ABSOLUTE NRBC 0.00 0.00 - 0.01 K/uL    NEUTROPHILS 67 32 - 75 %    LYMPHOCYTES 25 12 - 49 %    MONOCYTES 4 (L) 5 - 13 %    EOSINOPHILS 4 0 - 7 %    BASOPHILS 0 0 - 1 %    IMMATURE GRANULOCYTES 0 0.0 - 0.5 %    ABS. NEUTROPHILS 6.3 1.8 - 8.0 K/UL    ABS. LYMPHOCYTES 2.3 0.8 - 3.5 K/UL    ABS. MONOCYTES 0.4 0.0 - 1.0 K/UL    ABS. EOSINOPHILS 0.4 0.0 - 0.4 K/UL    ABS. BASOPHILS 0.0 0.0 - 0.1 K/UL    ABS. IMM.  GRANS. 0.0 0.00 - 0.04 K/UL    DF AUTOMATED     CK W/ CKMB & INDEX    Collection Time: 03/04/18 10:12 PM   Result Value Ref Range     26 - 192 U/L    CK - MB 1.3 <3.6 NG/ML    CK-MB Index 0.9 0 - 2.5     SED RATE (ESR)    Collection Time: 03/04/18 10:12 PM Result Value Ref Range    Sed rate, automated 17 0 - 20 mm/hr   URINALYSIS W/ REFLEX CULTURE    Collection Time: 03/04/18 10:15 PM   Result Value Ref Range    Color YELLOW/STRAW      Appearance CLEAR CLEAR      Specific gravity 1.023 1.003 - 1.030      pH (UA) 6.5 5.0 - 8.0      Protein NEGATIVE  NEG mg/dL    Glucose NEGATIVE  NEG mg/dL    Ketone NEGATIVE  NEG mg/dL    Bilirubin NEGATIVE  NEG      Blood NEGATIVE  NEG      Urobilinogen 0.2 0.2 - 1.0 EU/dL    Nitrites NEGATIVE  NEG      Leukocyte Esterase NEGATIVE  NEG      WBC 0-4 0 - 4 /hpf    RBC 0-5 0 - 5 /hpf    Epithelial cells FEW FEW /lpf    Bacteria NEGATIVE  NEG /hpf    UA:UC IF INDICATED CULTURE NOT INDICATED BY UA RESULT CNI      Hyaline cast 0-2 0 - 5 /lpf       Radiologic Studies -   CT HEAD WO CONT   Final Result        CT Results  (Last 48 hours)               03/04/18 2222  CT HEAD WO CONT Final result    Impression:  IMPRESSION:        There is no acute intracranial abnormality. Narrative:  EXAM:  CT HEAD WO CONT       INDICATION: Frontal headache. Hypertension. COMPARISON: CT head 11/26/2017       TECHNIQUE: Noncontrast head CT. Coronal and sagittal reformats. CT dose   reduction was achieved through use of a standardized protocol tailored for this   examination and automatic exposure control for dose modulation. FINDINGS: The ventricles and sulci are age-appropriate without hydrocephalus. There is no mass effect or midline shift. There is no intracranial hemorrhage or   extra-axial fluid collection. There is no abnormal parenchymal attenuation. The   gray-white matter differentiation is maintained. The basal cisterns are patent. The osseous structures are intact. The visualized paranasal sinuses and mastoid   air cells are clear. CXR Results  (Last 48 hours)    None            Medical Decision Making   I am the first provider for this patient.     I reviewed the vital signs, available nursing notes, past medical history, past surgical history, family history and social history. Vital Signs-Reviewed the patient's vital signs. Patient Vitals for the past 12 hrs:   Temp Pulse Resp BP SpO2   03/04/18 2345 - 81 19 - 94 %   03/04/18 2330 - 88 25 148/88 94 %   03/04/18 2315 - - - 153/89 99 %   03/04/18 2200 - - - (!) 154/100 96 %   03/04/18 2148 - - - - 99 %   03/04/18 2147 - - - (!) 169/101 -   03/04/18 2124 98.2 °F (36.8 °C) 91 16 (!) 175/116 99 %       Pulse Oximetry Analysis - 99% on RA    Cardiac Monitor:   Rate: 91 bpm  Rhythm: Normal Sinus Rhythm      Records Reviewed: Nursing Notes, Old Medical Records, Previous Radiology Studies and Previous Laboratory Studies    Provider Notes (Medical Decision Making):     DDX:  Sinus congestions/sinusitis, sinus headache, tension headache, ich, htn urgency/emergency, kidney dysfunction    Plan:  Labs, head ct, ivf, analgesic, antihypertensive if BP does no improve with analgesics    Impression:  Sinus congestion, headache, htn    ED Course:   Initial assessment performed. The patients presenting problems have been discussed, and they are in agreement with the care plan formulated and outlined with them. I have encouraged them to ask questions as they arise throughout their visit. I reviewed our electronic medical record system for any past medical records that were available that may contribute to the patients current condition, the nursing notes and and vital signs from today's visit    Nursing notes will be reviewed as they become available in realtime while the pt has been in the ED. Jose Higginbotham MD    I have spent 3-7 minutes discussing the medical risks of prolonged smoking habits and advised the patient of the benefits of the cessation of smoking, providing specific suggestions on how to quit. Jose Higginbotham MD    I personally reviewed pt's imaging. Official read by radiology listed below. Jose Higginbotham MD    PROGRESS NOTE:  11:43 PM  Pt reevaluated.  Pt with negative findings on CT and unremarkable labs, pt provided with toradol and has been resting quietly since that time. Blood pressure is noted to have improved without intervention. Venkata Cordoba MD    11:52 PM  Progress note:  Pt noted to be feeling better after Toradol, BP improved, feels ready for discharge. Discussed lab and imaging findings with pt, specifically noting negative workup. Pt will follow up as instructed. All questions have been answered, pt voiced understanding and agreement with plan. If narcotics were prescribed, pt was advised not to drive or operate heavy machinery. If abx were prescribed, pt advised that diarrhea and rash are possible side effects of the medications. Specific return precautions provided in addition to instructions for pt to return to the ED immediately should sx worsen at any time. Venkata Cordoba MD      PLAN:  1. Current Discharge Medication List      START taking these medications    Details   butalbital-acetaminophen-caff (FIORICET) -40 mg per capsule Take 1 Cap by mouth every six (6) hours as needed for Pain. Qty: 10 Cap, Refills: 0      fexofenadine-pseudoephedrine (ALLEGRA-D 12 HOUR)  mg Tb12 Take 1 Tab by mouth every twelve (12) hours. Qty: 10 Tab, Refills: 0           2. Follow-up Information     Follow up With Details Comments Contact Info    Carolann Santamaria MD Schedule an appointment as soon as possible for a visit in 2 days  [facility]  [phone]          Return to ED if worse     Disposition:    11:50 PM   The patient's results have been reviewed with family and/or caregiver. They verbally convey their understanding and agreement of the patient's signs, symptoms, diagnosis, treatment and prognosis and additionally agree to follow up as recommended in the discharge instructions or to return to the Emergency Room should the patient's condition change prior to their follow-up appointment.  The family and/or caregiver verbally agrees with the care-plan and all of their questions have been answered. The discharge instructions have also been provided to the them with educational information regarding the patient's diagnosis as well a list of reasons why the patient would want to return to the ER prior to their follow-up appointment should their condition change. Andrew Elias MD      Diagnosis     Clinical Impression:   1. Frontal headache    2. Sinus congestion        Attestations: This note is prepared by Keshav Villalta, acting as Scribe for MD Andrew Hall MD : The scribe's documentation has been prepared under my direction and personally reviewed by me in its entirety. I confirm that the note above accurately reflects all work, treatment, procedures, and medical decision making performed by me. This note will not be viewable in [address]

## 2019-01-04 ENCOUNTER — OFFICE VISIT (OUTPATIENT)
Dept: URGENT CARE | Age: 39
End: 2019-01-04

## 2019-01-04 VITALS
BODY MASS INDEX: 35.3 KG/M2 | TEMPERATURE: 97.3 F | HEIGHT: 61 IN | DIASTOLIC BLOOD PRESSURE: 94 MMHG | RESPIRATION RATE: 18 BRPM | OXYGEN SATURATION: 98 % | HEART RATE: 80 BPM | SYSTOLIC BLOOD PRESSURE: 186 MMHG | WEIGHT: 187 LBS

## 2019-01-04 DIAGNOSIS — R05.8 PRODUCTIVE COUGH: ICD-10-CM

## 2019-01-04 DIAGNOSIS — J20.9 ACUTE BRONCHITIS, UNSPECIFIED ORGANISM: Primary | ICD-10-CM

## 2019-01-04 DIAGNOSIS — J45.41 MODERATE PERSISTENT ASTHMA WITH ACUTE EXACERBATION: ICD-10-CM

## 2019-01-04 RX ORDER — POTASSIUM CHLORIDE 20 MEQ/1
20 TABLET, EXTENDED RELEASE ORAL DAILY
COMMUNITY
End: 2019-02-07

## 2019-01-04 RX ORDER — OMEPRAZOLE 20 MG/1
20 CAPSULE, DELAYED RELEASE ORAL
COMMUNITY
End: 2020-05-12

## 2019-01-04 RX ORDER — CARVEDILOL 6.25 MG/1
6.25 TABLET ORAL 2 TIMES DAILY WITH MEALS
Status: ON HOLD | COMMUNITY
End: 2019-02-07 | Stop reason: SDUPTHER

## 2019-01-04 RX ORDER — CEPHALEXIN 250 MG/1
250 CAPSULE ORAL 4 TIMES DAILY
COMMUNITY
End: 2019-02-05

## 2019-01-04 RX ORDER — GUAIFENESIN 600 MG/1
600 TABLET, EXTENDED RELEASE ORAL 2 TIMES DAILY
Qty: 14 TAB | Refills: 0 | Status: SHIPPED | OUTPATIENT
Start: 2019-01-04 | End: 2019-01-11

## 2019-01-04 RX ORDER — MONTELUKAST SODIUM 10 MG/1
10 TABLET ORAL DAILY
COMMUNITY

## 2019-01-04 RX ORDER — AZITHROMYCIN 250 MG/1
TABLET, FILM COATED ORAL
Qty: 6 TAB | Refills: 0 | Status: SHIPPED | OUTPATIENT
Start: 2019-01-04 | End: 2019-02-05

## 2019-01-04 RX ORDER — HYDRALAZINE HYDROCHLORIDE 10 MG/1
5 TABLET, FILM COATED ORAL 3 TIMES DAILY
COMMUNITY
End: 2019-02-07

## 2019-01-04 RX ORDER — ALBUTEROL SULFATE 90 UG/1
2 AEROSOL, METERED RESPIRATORY (INHALATION)
COMMUNITY

## 2019-01-04 RX ORDER — PREDNISONE 20 MG/1
TABLET ORAL
Qty: 8 TAB | Refills: 0 | Status: SHIPPED | OUTPATIENT
Start: 2019-01-04 | End: 2019-02-05

## 2019-01-04 RX ORDER — IPRATROPIUM BROMIDE AND ALBUTEROL SULFATE 2.5; .5 MG/3ML; MG/3ML
3 SOLUTION RESPIRATORY (INHALATION)
COMMUNITY

## 2019-01-04 NOTE — PATIENT INSTRUCTIONS
Follow up with PCP without improvement over next 4-5 days sooner/immediately for new or worsening       Bronchitis: Care Instructions  Your Care Instructions    Bronchitis is inflammation of the bronchial tubes, which carry air to the lungs. The tubes swell and produce mucus, or phlegm. The mucus and inflamed bronchial tubes make you cough. You may have trouble breathing. Most cases of bronchitis are caused by viruses like those that cause colds. Antibiotics usually do not help and they may be harmful. Bronchitis usually develops rapidly and lasts about 2 to 3 weeks in otherwise healthy people. Follow-up care is a key part of your treatment and safety. Be sure to make and go to all appointments, and call your doctor if you are having problems. It's also a good idea to know your test results and keep a list of the medicines you take. How can you care for yourself at home? · Take all medicines exactly as prescribed. Call your doctor if you think you are having a problem with your medicine. · Get some extra rest.  · Take an over-the-counter pain medicine, such as acetaminophen (Tylenol), ibuprofen (Advil, Motrin), or naproxen (Aleve) to reduce fever and relieve body aches. Read and follow all instructions on the label. · Do not take two or more pain medicines at the same time unless the doctor told you to. Many pain medicines have acetaminophen, which is Tylenol. Too much acetaminophen (Tylenol) can be harmful. · Take an over-the-counter cough medicine that contains dextromethorphan to help quiet a dry, hacking cough so that you can sleep. Avoid cough medicines that have more than one active ingredient. Read and follow all instructions on the label. · Breathe moist air from a humidifier, hot shower, or sink filled with hot water. The heat and moisture will thin mucus so you can cough it out. · Do not smoke. Smoking can make bronchitis worse.  If you need help quitting, talk to your doctor about stop-smoking programs and medicines. These can increase your chances of quitting for good. When should you call for help? Call 911 anytime you think you may need emergency care. For example, call if:    · You have severe trouble breathing.    Call your doctor now or seek immediate medical care if:    · You have new or worse trouble breathing.     · You cough up dark brown or bloody mucus (sputum).     · You have a new or higher fever.     · You have a new rash.    Watch closely for changes in your health, and be sure to contact your doctor if:    · You cough more deeply or more often, especially if you notice more mucus or a change in the color of your mucus.     · You are not getting better as expected. Where can you learn more? Go to http://rosemary-marisela.info/. Enter H333 in the search box to learn more about \"Bronchitis: Care Instructions. \"  Current as of: December 6, 2017  Content Version: 11.8  © 2182-6416 [employer]. Care instructions adapted under license by [employer] (which disclaims liability or warranty for this information). If you have questions about a medical condition or this instruction, always ask your healthcare professional. [place] [address] any warranty or liability for your use of this information.

## 2019-01-04 NOTE — LETTER
NOTIFICATION RETURN TO WORK / SCHOOL 
 
1/4/2019 3:28 PM 
 
Ms. Afia Joshi [address] [facility]. [address] [place] [address] To Whom It May Concern: Afia Joshi is currently under the care of [address] [place] [address]. She will return to work/school on: 01/07/2019 If there are questions or concerns please have the patient contact our office. Sincerely, E PROVIDER

## 2019-01-31 ENCOUNTER — OFFICE VISIT (OUTPATIENT)
Dept: SLEEP MEDICINE | Age: 39
End: 2019-01-31

## 2019-01-31 VITALS
WEIGHT: 195.2 LBS | HEIGHT: 61 IN | HEART RATE: 72 BPM | DIASTOLIC BLOOD PRESSURE: 93 MMHG | OXYGEN SATURATION: 97 % | SYSTOLIC BLOOD PRESSURE: 154 MMHG | BODY MASS INDEX: 36.85 KG/M2

## 2019-01-31 DIAGNOSIS — G47.33 OSA (OBSTRUCTIVE SLEEP APNEA): Primary | ICD-10-CM

## 2019-01-31 DIAGNOSIS — I10 ESSENTIAL HYPERTENSION: ICD-10-CM

## 2019-01-31 RX ORDER — CETIRIZINE HCL 10 MG
10 TABLET ORAL DAILY
COMMUNITY

## 2019-01-31 RX ORDER — LISINOPRIL 40 MG/1
40 TABLET ORAL DAILY
COMMUNITY
End: 2020-09-08

## 2019-01-31 RX ORDER — SERTRALINE HYDROCHLORIDE 50 MG/1
50 TABLET, FILM COATED ORAL DAILY
COMMUNITY
End: 2021-12-07

## 2019-01-31 NOTE — PROGRESS NOTES
[address] [place], [address]  Tel.  [phone]  Fax. 100 [facility] 60  [address] [facility] [address], [address]  Tel.  [phone]  Fax. [phone] [address] [facility] [place]   Tel.  [phone]  Fax. [phone]         Subjective: Addison Gutiérrez is an 45 y.o. female referred for evaluation for a sleep disorder. She complains of snoring associated with awakening in the middle of the night because of no specific reason. Symptoms began 7 years ago, unchanged since that time. She usually can fall asleep in 30 minutes. Family or house members note snoring. She reports of feeling completely or partially paralyzed while falling asleep or waking up. Addison Gutiérrez does wake up frequently at night. She is bothered by waking up too early and left unable to get back to sleep. She actually sleeps about 5 hours at night and wakes up about 3 times during the night. She does work shifts: Third Shift. Colstrip Ka indicates she does get too little sleep at night. Her bedtime is 0000. She awakens at 0400. She does not take naps. She takes   naps a week lasting  . She has the following observed behaviors: Loud snoring, Light snoring; Nightmares. [place] Sleepiness Score: 11 which reflect moderate daytime drowsiness. Allergies   Allergen Reactions    Amlodipine Other (comments)     Pt states it makes her BP higher    Percocet [Oxycodone-Acetaminophen] Itching    Sulfa (Sulfonamide Antibiotics) Itching    Vicodin [Hydrocodone-Acetaminophen] Itching         Current Outpatient Medications:     lisinopril (PRINIVIL, ZESTRIL) 40 mg tablet, Take 40 mg by mouth daily. , Disp: , Rfl:     Cetirizine (ZYRTEC) 10 mg cap, Take  by mouth., Disp: , Rfl:     sertraline (ZOLOFT) 50 mg tablet, Take  by mouth daily. , Disp: , Rfl:     montelukast (SINGULAIR) 10 mg tablet, Take 10 mg by mouth daily. , Disp: , Rfl:     albuterol-ipratropium (DUO-NEB) 2.5 mg-0.5 mg/3 ml nebu, 3 mL by Nebulization route., Disp: , Rfl:     albuterol (PROAIR HFA) 90 mcg/actuation inhaler, Take  by inhalation. , Disp: , Rfl:     potassium chloride (K-DUR, KLOR-CON) 20 mEq tablet, Take  by mouth two (2) times a day., Disp: , Rfl:     carvedilol (COREG) 6.25 mg tablet, Take 6.25 mg by mouth two (2) times daily (with meals). , Disp: , Rfl:     hydrALAZINE (APRESOLINE) 10 mg tablet, Take 5 mg by mouth three (3) times daily. , Disp: , Rfl:     omeprazole (PRILOSEC) 20 mg capsule, Take 20 mg by mouth daily. , Disp: , Rfl:     cephALEXin (KEFLEX) 250 mg capsule, Take 250 mg by mouth four (4) times daily. , Disp: , Rfl:     azithromycin (ZITHROMAX) 250 mg tablet, Take 2 tablets on day 1 then 1 tablet per day for remaining 4 days. Take by mouth., Disp: 6 Tab, Rfl: 0    predniSONE (DELTASONE) 20 mg tablet, Take 2 tabs by mouth one time daily with food for 4 days. , Disp: 8 Tab, Rfl: 0    lisinopril (PRINIVIL, ZESTRIL) 20 mg tablet, Take  by mouth daily. , Disp: , Rfl:      She  has a past medical history of Asthma and Hypertension. She  has a past surgical history that includes hx gyn. She family history includes Asthma in her father; Diabetes in her mother. She  reports that she has been smoking. She has been smoking about 0.50 packs per day. she has never used smokeless tobacco. She reports that she drinks alcohol. She reports that she does not use drugs.      Review of Systems:  Constitutional: significant weight gain - 10 lbs  Eyes:  No blurred vision  CVS:  No significant chest pain  Pulm:  No significant shortness of breath  GI:  No significant nausea or vomiting  :  No significant nocturia  Musculoskeletal:  No significant joint pain at night  Skin:  No significant rashes  Neuro:  No significant dizziness   Psych:  No active mood issues    Sleep Review of Systems: notable for difficulty falling asleep; frequent awakenings at night;  regular dreaming noted; nightmares ; early morning headaches; no memory problems; no concentration issues; no history of any automobile or occupational accidents due to daytime drowsiness. Objective:     Visit Vitals  BP (!) 154/93   Pulse 72   Ht 5' 1\" (1.549 m)   Wt 195 lb 3.2 oz (88.5 kg)   SpO2 97%   BMI 36.88 kg/m²         General:   Not in acute distress   Eyes:  Anicteric sclerae, no obvious strabismus   Nose:  No obvious nasal septum deviation    Oropharynx:   Class 4 oropharyngeal outlet, thick tongue base, uvula could not be seen due to low-lying soft palate, narrow tonsilo-pharyngeal pilars   Tonsils:   tonsils are not seen due to low-lying soft palate   Neck:   Neck circ. in \"inches\": 14; midline trachea   Chest/Lungs:  Equal lung expansion, clear on auscultation    CVS:  Normal rate, regular rhythm; no JVD   Skin:  Warm to touch; no obvious rashes   Neuro:  No focal deficits ; no obvious tremor    Psych:  Normal affect,  normal countenance;          Assessment:       ICD-10-CM ICD-9-CM    1. WILLIAMS (obstructive sleep apnea) G47.33 327.23 SLEEP STUDY UNATTENDED, 4 CHANNEL   2. BMI 36.0-36.9,adult Z68.36 V85.36    3. Essential hypertension I10 401.9          Plan:     * The patient currently has a Moderate Risk for having sleep apnea. STOP-BANG score 4.  * Sleep testing was ordered for initial evaluation. * She was provided information on sleep apnea including coresponding risk factors and the importance of proper treatment. * Treatment options if indicated were reviewed today. Patient agrees to a trial of PAP therapy if indicated. * Counseling was provided regarding proper sleep hygiene (including effect of light on sleep), paradoxical intention, stimulus control, sleep environment safety and safe driving. * Effect of sleep disturbance on weight was reviewed. We have recommended a dedicated weight loss through appropriate diet and an exercise regiment as significant weight reduction has been shown to reduce severity of obstructive sleep apnea. * Patient agrees to telephone [phone]  follow-up by myself or lead sleep technologist shortly after sleep study to review results and plan final management.     (patient has given permission for a message to be left regarding test results and further management if patient cannot be cannot be reached directly). Thank you for allowing us to participate in your patient's medical care. We'll keep you updated on these investigations. Jennifer Mayo MD, FAASM  Electronically signed.  01/31/19

## 2019-01-31 NOTE — PATIENT INSTRUCTIONS
[address] [place], [address]  Tel.  [phone]  Fax. 100 [facility] 60  [place], [address] [facility]  Tel.  [phone]  Fax. [phone] [address]  Tel.  [phone]  Fax. [phone]     Sleep Apnea: After Your Visit  Your Care Instructions  Sleep apnea occurs when you frequently stop breathing for 10 seconds or longer during sleep. It can be mild to severe, based on the number of times per hour that you stop breathing or have slowed breathing. Blocked or narrowed airways in your nose, mouth, or throat can cause sleep apnea. Your airway can become blocked when your throat muscles and tongue relax during sleep. Sleep apnea is common, occurring in 1 out of 20 individuals. Individuals having any of the following characteristics should be evaluated and treated right away due to high risk and detrimental consequences from untreated sleep apnea:  1. Obesity  2. Congestive Heart failure  3. Atrial Fibrillation  4. Uncontrolled Hypertension  5. Type II Diabetes  6. Night-time Arrhythmias  7. Stroke  8. Pulmonary Hypertension  9. High-risk Driving Populations (pilots, truck drivers, etc.)  10. Patients Considering Weight-loss Surgery    How do you know you have sleep apnea? You probably have sleep apnea if you answer 'yes' to 3 or more of the following questions:  S - Have you been told that you Snore? T - Are you often Tired during the day? O - Has anyone Observed you stop breathing while sleeping? P- Do you have (or are being treated for) high blood Pressure? B - Are you obese (Body Mass Index > 35)? A - Is your Age 48years old or older? N - Is your Neck size greater than 16 inches? G - Are you male Gender? A sleep physician can prescribe a breathing device that prevents tissues in the throat from blocking your airway.  Or your doctor may recommend using a dental device (oral breathing device) to help keep your airway open. In some cases, surgery may be needed to remove enlarged tissues in the throat. Follow-up care is a key part of your treatment and safety. Be sure to make and go to all appointments, and call your doctor if you are having problems. It's also a good idea to know your test results and keep a list of the medicines you take. How can you care for yourself at home? · Lose weight, if needed. It may reduce the number of times you stop breathing or have slowed breathing. · Go to bed at the same time every night. · Sleep on your side. It may stop mild apnea. If you tend to roll onto your back, sew a pocket in the back of your pajama top. Put a tennis ball into the pocket, and stitch the pocket shut. This will help keep you from sleeping on your back. · Avoid alcohol and medicines such as sleeping pills and sedatives before bed. · Do not smoke. Smoking can make sleep apnea worse. If you need help quitting, talk to your doctor about stop-smoking programs and medicines. These can increase your chances of quitting for good. · Prop up the head of your bed 4 to 6 inches by putting bricks under the legs of the bed. · Treat breathing problems, such as a stuffy nose, caused by a cold or allergies. · Use a continuous positive airway pressure (CPAP) breathing machine if lifestyle changes do not help your apnea and your doctor recommends it. The machine keeps your airway from closing when you sleep. · If CPAP does not help you, ask your doctor whether you should try other breathing machines. A bilevel positive airway pressure machine has two types of air pressureâone for breathing in and one for breathing out. Another device raises or lowers air pressure as needed while you breathe. · If your nose feels dry or bleeds when using one of these machines, talk with your doctor about increasing moisture in the air. A humidifier may help.   · If your nose is runny or stuffy from using a breathing machine, talk with your doctor about using decongestants or a corticosteroid nasal spray. When should you call for help? Watch closely for changes in your health, and be sure to contact your doctor if:  · You still have sleep apnea even though you have made lifestyle changes. · You are thinking of trying a device such as CPAP. · You are having problems using a CPAP or similar machine. Where can you learn more? Go to Conject. Enter F754 in the search box to learn more about \"Sleep Apnea: After Your Visit. \"   © 9190-1554 Healthwise, Incorporated. Care instructions adapted under license by [facility] [employer] (which disclaims liability or warranty for this information). This care instruction is for use with your licensed healthcare professional. If you have questions about a medical condition or this instruction, always ask your healthcare professional. Juvenciozzy Alonso any warranty or liability for your use of this information. PROPER SLEEP HYGIENE    What to avoid  · Do not have drinks with caffeine, such as coffee or black tea, for 8 hours before bed. · Do not smoke or use other types of tobacco near bedtime. Nicotine is a stimulant and can keep you awake. · Avoid drinking alcohol late in the evening, because it can cause you to wake in the middle of the night. · Do not eat a big meal close to bedtime. If you are hungry, eat a light snack. · Do not drink a lot of water close to bedtime, because the need to urinate may wake you up during the night. · Do not read or watch TV in bed. Use the bed only for sleeping and sexual activity. What to try  · Go to bed at the same time every night, and wake up at the same time every morning. Do not take naps during the day. · Keep your bedroom quiet, dark, and cool. · Get regular exercise, but not within 3 to 4 hours of your bedtime. .  · Sleep on a comfortable pillow and mattress.   · If watching the clock makes you anxious, turn it facing away from you so you cannot see the time. · If you worry when you lie down, start a worry book. Well before bedtime, write down your worries, and then set the book and your concerns aside. · Try meditation or other relaxation techniques before you go to bed. · If you cannot fall asleep, get up and go to another room until you feel sleepy. Do something relaxing. Repeat your bedtime routine before you go to bed again. · Make your house quiet and calm about an hour before bedtime. Turn down the lights, turn off the TV, log off the computer, and turn down the volume on music. This can help you relax after a busy day. Drowsy Driving  The [address] [place] [address] Road Traffic Safety Administration cites drowsiness as a causing factor in more than 919,046 police reported crashes annually, resulting in 76,000 injuries and 1,500 deaths. Other surveys suggest 55% of people polled have driven while drowsy in the past year, 23% had fallen asleep but not crashed, 3% crashed, and 2% had and accident due to drowsy driving. Who is at risk? Young Drivers: One study of drowsy driving accidents states that 55% of the drivers were under 25 years. Of those, 75% were male. Shift Workers and Travelers: People who work overnight or travel across time zones frequently are at higher risk of experiencing Circadian Rhythm Disorders. They are trying to work and function when their body is programed to sleep. Sleep Deprived: Lack of sleep has a serious impact on your ability to pay attention or focus on a task. Consistently getting less than the average of 8 hours your body needs creates partial or cumulative sleep deprivation. Untreated Sleep Disorders: Sleep Apnea, Narcolepsy, R.L.S., and other sleep disorders (untreated) prevent a person from getting enough restful sleep. This leads to excessive daytime sleepiness and increases the risk for drowsy driving accidents by up to 7 times.   Medications / Alcohol: Even over the counter medications can cause drowsiness. Medications that impair a drivers attention should have a warning label. Alcohol naturally makes you sleepy and on its own can cause accidents. Combined with excessive drowsiness its effects are amplified. Signs of Drowsy Driving:   * You don't remember driving the last few miles   * You may drift out of your william   * You are unable to focus and your thoughts wander   * You may yawn more often than normal   * You have difficulty keeping your eyes open / nodding off   * Missing traffic signs, speeding, or tailgating  Prevention-   Good sleep hygiene, lifestyle and behavioral choices have the most impact on drowsy driving. There is no substitute for sleep and the average person requires 8 hours nightly. If you find yourself driving drowsy, stop and sleep. Consider the sleep hygiene tips provided during your visit as well. Medication Refill Policy: Refills for all medications require 1 week advance notice. Please have your pharmacy fax a refill request. We are unable to fax, or call in \"controled substance\" medications and you will need to pick these prescriptions up from our office. [facility] Activation    Thank you for requesting access to [facility]. Please follow the instructions below to securely access and download your online medical record. [facility] allows you to send messages to your doctor, view your test results, renew your prescriptions, schedule appointments, and more. How Do I Sign Up? 1. In your internet browser, go to [email or web address] [email or web address]. 2. Click on the First Time User? Click Here link in the Sign In box. You will see the New Member Sign Up page. 3. Enter your [facility] Access Code exactly as it appears below. You will not need to use this code after youve completed the sign-up process. If you do not sign up before the expiration date, you must request a new code.     [facility] Access Code: [email or web address]  Expires: 2/18/2019  3:15 PM (This is the date your Touch[employer] access code will )    4. Enter the last four digits of your Social Security Number (xxxx) and Date of Birth (mm/dd/yyyy) as indicated and click Submit. You will be taken to the next sign-up page. 5. Create a Night Upt ID. This will be your Touch[employer] login ID and cannot be changed, so think of one that is secure and easy to remember. 6. Create a Touch[employer] password. You can change your password at any time. 7. Enter your Password Reset Question and Answer. This can be used at a later time if you forget your password. 8. Enter your e-mail address. You will receive e-mail notification when new information is available in [address] 9. Click Sign Up. You can now view and download portions of your medical record. 10. Click the Download Summary menu link to download a portable copy of your medical information. Additional Information    If you have questions, please call [phone]. Remember, [employer] is NOT to be used for urgent needs. For medical emergencies, dial 911.

## 2019-01-31 NOTE — PROGRESS NOTES
[address] [facility] [facility]. [place], [address]  Tel.  [phone]  Fax. 100 [facility] 60  [place], [address] [facility]  Tel.  [phone]  Fax. [phone] [address] [facility] Roxanna [place]   Tel.  [phone]  Fax. [phone]       S>Dolores Tolbert is a 45 y.o. female seen today to receive a home sleep testing unit (HST). · Patient was educated on proper hookup and operation of the HST. · Instruction forms and documentation were reviewed and signed. · The patient demonstrated good understanding of the HST. O>    Visit Vitals  BP (!) 154/93   Pulse 72   Ht 5' 1\" (1.549 m)   Wt 195 lb 3.2 oz (88.5 kg)   SpO2 97%   BMI 36.88 kg/m²    Neck circ. in \"inches\": 14    A>  1. WILLIAMS (obstructive sleep apnea)    2. BMI 36.0-36.9,adult    3. Essential hypertension          P>  · General information regarding operations and maintenance of the device was provided. · She was provided information on sleep apnea including coresponding risk factors and the importance of proper treatment. · Follow-up appointment was made to return the HST. She will be contacted once the results have been reviewed. · She was asked to contact our office for any problems regarding her home sleep test study.

## 2019-02-01 ENCOUNTER — DOCUMENTATION ONLY (OUTPATIENT)
Dept: SLEEP MEDICINE | Age: 39
End: 2019-02-01

## 2019-02-05 ENCOUNTER — HOSPITAL ENCOUNTER (INPATIENT)
Age: 39
LOS: 2 days | Discharge: HOME | DRG: 305 | End: 2019-02-07
Attending: EMERGENCY MEDICINE | Admitting: INTERNAL MEDICINE
Payer: SELF-PAY

## 2019-02-05 ENCOUNTER — APPOINTMENT (OUTPATIENT)
Dept: CT IMAGING | Age: 39
DRG: 305 | End: 2019-02-05
Attending: EMERGENCY MEDICINE
Payer: SELF-PAY

## 2019-02-05 DIAGNOSIS — I10 HYPERTENSION, UNSPECIFIED TYPE: Primary | ICD-10-CM

## 2019-02-05 PROBLEM — I16.0 HYPERTENSIVE URGENCY: Status: ACTIVE | Noted: 2019-02-05

## 2019-02-05 LAB
ALBUMIN SERPL-MCNC: 3.5 G/DL (ref 3.5–5)
ALBUMIN/GLOB SERPL: 0.9 {RATIO} (ref 1.1–2.2)
ALP SERPL-CCNC: 61 U/L (ref 45–117)
ALT SERPL-CCNC: 21 U/L (ref 12–78)
ANION GAP SERPL CALC-SCNC: 10 MMOL/L (ref 5–15)
APPEARANCE UR: CLEAR
AST SERPL-CCNC: 14 U/L (ref 15–37)
ATRIAL RATE: 74 BPM
BASOPHILS # BLD: 0 K/UL (ref 0–0.1)
BASOPHILS NFR BLD: 0 % (ref 0–1)
BILIRUB SERPL-MCNC: 0.4 MG/DL (ref 0.2–1)
BILIRUB UR QL: NEGATIVE
BUN SERPL-MCNC: 7 MG/DL (ref 6–20)
BUN/CREAT SERPL: 11 (ref 12–20)
CALCIUM SERPL-MCNC: 8.9 MG/DL (ref 8.5–10.1)
CALCULATED P AXIS, ECG09: 54 DEGREES
CALCULATED R AXIS, ECG10: 120 DEGREES
CALCULATED T AXIS, ECG11: 38 DEGREES
CHLORIDE SERPL-SCNC: 100 MMOL/L (ref 97–108)
CO2 SERPL-SCNC: 29 MMOL/L (ref 21–32)
COLOR UR: NORMAL
COMMENT, HOLDF: NORMAL
CREAT SERPL-MCNC: 0.65 MG/DL (ref 0.55–1.02)
DIAGNOSIS, 93000: NORMAL
DIFFERENTIAL METHOD BLD: NORMAL
EOSINOPHIL # BLD: 0.1 K/UL (ref 0–0.4)
EOSINOPHIL NFR BLD: 1 % (ref 0–7)
ERYTHROCYTE [DISTWIDTH] IN BLOOD BY AUTOMATED COUNT: 12.8 % (ref 11.5–14.5)
GLOBULIN SER CALC-MCNC: 3.9 G/DL (ref 2–4)
GLUCOSE SERPL-MCNC: 104 MG/DL (ref 65–100)
GLUCOSE UR STRIP.AUTO-MCNC: NEGATIVE MG/DL
HCT VFR BLD AUTO: 37.6 % (ref 35–47)
HGB BLD-MCNC: 11.7 G/DL (ref 11.5–16)
HGB UR QL STRIP: NEGATIVE
IMM GRANULOCYTES # BLD AUTO: 0 K/UL (ref 0–0.04)
IMM GRANULOCYTES NFR BLD AUTO: 0 % (ref 0–0.5)
KETONES UR QL STRIP.AUTO: NEGATIVE MG/DL
LEUKOCYTE ESTERASE UR QL STRIP.AUTO: NEGATIVE
LYMPHOCYTES # BLD: 1.7 K/UL (ref 0.8–3.5)
LYMPHOCYTES NFR BLD: 22 % (ref 12–49)
MCH RBC QN AUTO: 27.5 PG (ref 26–34)
MCHC RBC AUTO-ENTMCNC: 31.1 G/DL (ref 30–36.5)
MCV RBC AUTO: 88.5 FL (ref 80–99)
MONOCYTES # BLD: 0.4 K/UL (ref 0–1)
MONOCYTES NFR BLD: 5 % (ref 5–13)
NEUTS SEG # BLD: 5.8 K/UL (ref 1.8–8)
NEUTS SEG NFR BLD: 72 % (ref 32–75)
NITRITE UR QL STRIP.AUTO: NEGATIVE
NRBC # BLD: 0 K/UL (ref 0–0.01)
NRBC BLD-RTO: 0 PER 100 WBC
P-R INTERVAL, ECG05: 196 MS
PH UR STRIP: 7 [PH] (ref 5–8)
PLATELET # BLD AUTO: 291 K/UL (ref 150–400)
PMV BLD AUTO: 10.4 FL (ref 8.9–12.9)
POTASSIUM SERPL-SCNC: 3.1 MMOL/L (ref 3.5–5.1)
PROT SERPL-MCNC: 7.4 G/DL (ref 6.4–8.2)
PROT UR STRIP-MCNC: NEGATIVE MG/DL
Q-T INTERVAL, ECG07: 400 MS
QRS DURATION, ECG06: 90 MS
QTC CALCULATION (BEZET), ECG08: 444 MS
RBC # BLD AUTO: 4.25 M/UL (ref 3.8–5.2)
SAMPLES BEING HELD,HOLD: NORMAL
SODIUM SERPL-SCNC: 139 MMOL/L (ref 136–145)
SP GR UR REFRACTOMETRY: 1.01 (ref 1–1.03)
UR CULT HOLD, URHOLD: NORMAL
UROBILINOGEN UR QL STRIP.AUTO: 0.2 EU/DL (ref 0.2–1)
VENTRICULAR RATE, ECG03: 74 BPM
WBC # BLD AUTO: 8.1 K/UL (ref 3.6–11)

## 2019-02-05 PROCEDURE — 36415 COLL VENOUS BLD VENIPUNCTURE: CPT

## 2019-02-05 PROCEDURE — 74011250637 HC RX REV CODE- 250/637: Performed by: INTERNAL MEDICINE

## 2019-02-05 PROCEDURE — 81003 URINALYSIS AUTO W/O SCOPE: CPT

## 2019-02-05 PROCEDURE — 74011250636 HC RX REV CODE- 250/636: Performed by: EMERGENCY MEDICINE

## 2019-02-05 PROCEDURE — 74011000250 HC RX REV CODE- 250: Performed by: INTERNAL MEDICINE

## 2019-02-05 PROCEDURE — 85025 COMPLETE CBC W/AUTO DIFF WBC: CPT

## 2019-02-05 PROCEDURE — 65660000000 HC RM CCU STEPDOWN

## 2019-02-05 PROCEDURE — 93005 ELECTROCARDIOGRAM TRACING: CPT

## 2019-02-05 PROCEDURE — 96374 THER/PROPH/DIAG INJ IV PUSH: CPT

## 2019-02-05 PROCEDURE — 94640 AIRWAY INHALATION TREATMENT: CPT

## 2019-02-05 PROCEDURE — 94664 DEMO&/EVAL PT USE INHALER: CPT

## 2019-02-05 PROCEDURE — 99284 EMERGENCY DEPT VISIT MOD MDM: CPT

## 2019-02-05 PROCEDURE — 74011250636 HC RX REV CODE- 250/636: Performed by: INTERNAL MEDICINE

## 2019-02-05 PROCEDURE — 70450 CT HEAD/BRAIN W/O DYE: CPT

## 2019-02-05 PROCEDURE — 80053 COMPREHEN METABOLIC PANEL: CPT

## 2019-02-05 RX ORDER — METOPROLOL TARTRATE 5 MG/5ML
5 INJECTION INTRAVENOUS
Status: DISCONTINUED | OUTPATIENT
Start: 2019-02-05 | End: 2019-02-05

## 2019-02-05 RX ORDER — LISINOPRIL 20 MG/1
40 TABLET ORAL DAILY
Status: DISCONTINUED | OUTPATIENT
Start: 2019-02-06 | End: 2019-02-07 | Stop reason: HOSPADM

## 2019-02-05 RX ORDER — CETIRIZINE HCL 10 MG
10 TABLET ORAL DAILY
Status: DISCONTINUED | OUTPATIENT
Start: 2019-02-06 | End: 2019-02-07 | Stop reason: HOSPADM

## 2019-02-05 RX ORDER — PANTOPRAZOLE SODIUM 40 MG/1
40 TABLET, DELAYED RELEASE ORAL
Status: DISCONTINUED | OUTPATIENT
Start: 2019-02-06 | End: 2019-02-07 | Stop reason: HOSPADM

## 2019-02-05 RX ORDER — LABETALOL HCL 20 MG/4 ML
10 SYRINGE (ML) INTRAVENOUS
Status: COMPLETED | OUTPATIENT
Start: 2019-02-05 | End: 2019-02-05

## 2019-02-05 RX ORDER — HYDRALAZINE HYDROCHLORIDE 20 MG/ML
10 INJECTION INTRAMUSCULAR; INTRAVENOUS
Status: DISCONTINUED | OUTPATIENT
Start: 2019-02-05 | End: 2019-02-07 | Stop reason: HOSPADM

## 2019-02-05 RX ORDER — LABETALOL HCL 20 MG/4 ML
20 SYRINGE (ML) INTRAVENOUS
Status: DISCONTINUED | OUTPATIENT
Start: 2019-02-05 | End: 2019-02-06

## 2019-02-05 RX ORDER — SODIUM CHLORIDE 0.9 % (FLUSH) 0.9 %
5-40 SYRINGE (ML) INJECTION EVERY 8 HOURS
Status: DISCONTINUED | OUTPATIENT
Start: 2019-02-05 | End: 2019-02-07 | Stop reason: HOSPADM

## 2019-02-05 RX ORDER — SERTRALINE HYDROCHLORIDE 50 MG/1
50 TABLET, FILM COATED ORAL DAILY
Status: DISCONTINUED | OUTPATIENT
Start: 2019-02-06 | End: 2019-02-07 | Stop reason: HOSPADM

## 2019-02-05 RX ORDER — OMEPRAZOLE 20 MG/1
20 CAPSULE, DELAYED RELEASE ORAL
Status: DISCONTINUED | OUTPATIENT
Start: 2019-02-06 | End: 2019-02-05 | Stop reason: CLARIF

## 2019-02-05 RX ORDER — SODIUM CHLORIDE 0.9 % (FLUSH) 0.9 %
5-40 SYRINGE (ML) INJECTION AS NEEDED
Status: DISCONTINUED | OUTPATIENT
Start: 2019-02-05 | End: 2019-02-07 | Stop reason: HOSPADM

## 2019-02-05 RX ORDER — IPRATROPIUM BROMIDE AND ALBUTEROL SULFATE 2.5; .5 MG/3ML; MG/3ML
3 SOLUTION RESPIRATORY (INHALATION)
Status: DISCONTINUED | OUTPATIENT
Start: 2019-02-05 | End: 2019-02-07 | Stop reason: HOSPADM

## 2019-02-05 RX ORDER — CARVEDILOL 6.25 MG/1
6.25 TABLET ORAL 2 TIMES DAILY WITH MEALS
Status: DISCONTINUED | OUTPATIENT
Start: 2019-02-05 | End: 2019-02-06

## 2019-02-05 RX ORDER — IBUPROFEN 200 MG
1 TABLET ORAL DAILY
Status: DISCONTINUED | OUTPATIENT
Start: 2019-02-06 | End: 2019-02-07 | Stop reason: HOSPADM

## 2019-02-05 RX ORDER — ENOXAPARIN SODIUM 100 MG/ML
40 INJECTION SUBCUTANEOUS EVERY 24 HOURS
Status: DISCONTINUED | OUTPATIENT
Start: 2019-02-05 | End: 2019-02-07 | Stop reason: HOSPADM

## 2019-02-05 RX ORDER — MONTELUKAST SODIUM 10 MG/1
10 TABLET ORAL DAILY
Status: DISCONTINUED | OUTPATIENT
Start: 2019-02-06 | End: 2019-02-07 | Stop reason: HOSPADM

## 2019-02-05 RX ORDER — HYDROCHLOROTHIAZIDE 25 MG/1
25 TABLET ORAL DAILY
Status: DISCONTINUED | OUTPATIENT
Start: 2019-02-06 | End: 2019-02-07 | Stop reason: HOSPADM

## 2019-02-05 RX ORDER — HYDROCHLOROTHIAZIDE 25 MG/1
25 TABLET ORAL DAILY
COMMUNITY
End: 2019-02-08

## 2019-02-05 RX ORDER — ALBUTEROL SULFATE 0.83 MG/ML
2.5 SOLUTION RESPIRATORY (INHALATION)
Status: DISCONTINUED | OUTPATIENT
Start: 2019-02-05 | End: 2019-02-07 | Stop reason: HOSPADM

## 2019-02-05 RX ADMIN — LABETALOL 20 MG/4 ML (5 MG/ML) INTRAVENOUS SYRINGE 10 MG: at 14:49

## 2019-02-05 RX ADMIN — CARVEDILOL 6.25 MG: 6.25 TABLET, FILM COATED ORAL at 19:59

## 2019-02-05 RX ADMIN — IPRATROPIUM BROMIDE AND ALBUTEROL SULFATE 3 ML: .5; 3 SOLUTION RESPIRATORY (INHALATION) at 20:58

## 2019-02-05 RX ADMIN — ENOXAPARIN SODIUM 40 MG: 40 INJECTION SUBCUTANEOUS at 19:59

## 2019-02-05 RX ADMIN — HYDRALAZINE HYDROCHLORIDE 10 MG: 20 INJECTION INTRAMUSCULAR; INTRAVENOUS at 23:11

## 2019-02-05 NOTE — ED TRIAGE NOTES
C/o hypertension, headache x 1 week and feeling \"off balance\" since last night. Took tylenol cold & sinus meds yesterday. Reports 180/120 last night, 190's SBP this morning. Complaint with BP meds.

## 2019-02-05 NOTE — ED PROVIDER NOTES
This 70-year-old female with a history of hypertension on multiple medications by her primary care physician. She was seen last week and does not remember what her blood pressure was at that time. For the last week she has had headache which is worsening in scope. His morbid generalized headache. She also feels like she is just not herself. There has been perhaps a little confusion and dizziness but no focal findings. She has had no fever or chills, no nausea or vomiting, no neck pain, no chest pain and no abdominal pain. She has been taking her medications as directed. She called her own physician today and was sent to the ER for further evaluation of these headaches and hypertension.              Past Medical History:   Diagnosis Date    Asthma     GERD (gastroesophageal reflux disease)     Hypertension        Past Surgical History:   Procedure Laterality Date    HX GYN      ovarian cyst         Family History:   Problem Relation Age of Onset    Diabetes Mother     Asthma Father        Social History     Socioeconomic History    Marital status:      Spouse name: Not on file    Number of children: Not on file    Years of education: Not on file    Highest education level: Not on file   Social Needs    Financial resource strain: Not on file    Food insecurity - worry: Not on file    Food insecurity - inability: Not on file   [employer] needs - medical: Not on file   [employer] needs - non-medical: Not on file   Occupational History    Not on file   Tobacco Use    Smoking status: Current Every Day Smoker     Packs/day: 0.50    Smokeless tobacco: Never Used   Substance and Sexual Activity    Alcohol use: Yes     Comment: socially    Drug use: No    Sexual activity: Not on file   Other Topics Concern     Service Not Asked    Blood Transfusions Not Asked    Caffeine Concern Not Asked    Occupational Exposure Not Asked    Hobby Hazards Not Asked    Sleep Concern Not Asked  Stress Concern Not Asked    Weight Concern Not Asked    Special Diet Not Asked    Back Care Not Asked    Exercise Not Asked    Bike Helmet Not Asked   [address] [facility] [facility] Not Asked    Self-Exams Not Asked   Social History Narrative    Not on file         ALLERGIES: Amlodipine; Percocet [oxycodone-acetaminophen]; Sulfa (sulfonamide antibiotics); and Vicodin [hydrocodone-acetaminophen]    Review of Systems   Constitutional: Negative for activity change, appetite change and fatigue. HENT: Negative for ear pain, facial swelling, sore throat and trouble swallowing. Eyes: Negative for pain, discharge and visual disturbance. Respiratory: Negative for chest tightness, shortness of breath and wheezing. Cardiovascular: Negative for chest pain and palpitations. Gastrointestinal: Negative for abdominal pain, blood in stool, nausea and vomiting. Genitourinary: Negative for difficulty urinating, flank pain and hematuria. Musculoskeletal: Negative for arthralgias, joint swelling, myalgias and neck pain. Skin: Negative for color change and rash. Neurological: Positive for headaches. Negative for dizziness, weakness and numbness. See HPI     Hematological: Negative for adenopathy. Does not bruise/bleed easily. Psychiatric/Behavioral: Negative for behavioral problems, confusion and sleep disturbance. All other systems reviewed and are negative. Vitals:    02/05/19 1304 02/05/19 1342 02/05/19 1343   BP: (!) 201/131 (!) 181/134 (!) 178/134   Pulse: 76 75    Resp: 16     Temp: 98.5 °F (36.9 °C)     SpO2: 97% 97%             Physical Exam   Constitutional: She is oriented to person, place, and time. She appears well-developed and well-nourished. No distress. BP is noted to be  Consistently at about 178/134 both arms. HENT:   Head: Normocephalic and atraumatic.    Nose: Nose normal.   Mouth/Throat: Oropharynx is clear and moist.   Eyes: Conjunctivae and EOM are normal. Pupils are equal, round, and reactive to light. No scleral icterus. Neck: Normal range of motion. Neck supple. No JVD present. No tracheal deviation present. No thyromegaly present. No carotid bruits noted. Cardiovascular: Normal rate, regular rhythm, normal heart sounds and intact distal pulses. Exam reveals no gallop and no friction rub. No murmur heard. Pulmonary/Chest: Effort normal and breath sounds normal. No respiratory distress. She has no wheezes. She has no rales. She exhibits no tenderness. Abdominal: Soft. Bowel sounds are normal. She exhibits no distension and no mass. There is no tenderness. There is no rebound and no guarding. Musculoskeletal: Normal range of motion. She exhibits no edema or tenderness. Lymphadenopathy:     She has no cervical adenopathy. Neurological: She is alert and oriented to person, place, and time. She has normal reflexes. No cranial nerve deficit. Coordination normal.   Skin: Skin is warm and dry. No rash noted. No erythema. Psychiatric: She has a normal mood and affect. Her behavior is normal. Judgment and thought content normal.   Nursing note and vitals reviewed. MDM  Number of Diagnoses or Management Options     Amount and/or Complexity of Data Reviewed  Clinical lab tests: ordered and reviewed  Tests in the radiology section of CPT®: ordered and reviewed  Decide to obtain previous medical records or to obtain history from someone other than the patient: yes  Review and summarize past medical records: yes  Discuss the patient with other providers: yes  Independent visualization of images, tracings, or specimens: yes    Risk of Complications, Morbidity, and/or Mortality  Presenting problems: high  Diagnostic procedures: moderate  Management options: moderate    Patient Progress  Patient progress: stable         Procedures    We will check labs and provide medication for blood pressure. Serial checks of blood pressure.  Patient clinically does not appear to be in any significant distress. Patient continues with elevated pressures and will admit to the hospitalist to achieve better control of the same. She voices understanding and is admitted for further evaluation and care.

## 2019-02-05 NOTE — PROGRESS NOTES
Admission Medication Reconciliation:    Information obtained from: This medication history was obtained from the patient. She brought her prescription bottles to the hospital.  The majority were filled within the past month for 30 day supplies. An RX Query is not available. Summary:     Medications added: none  Medications deleted: none  Dose changes: potassium chloride 20 meq daily (versus BID)    Inpatient orders were reviewed and no changes are needed. Chief Complaint for this Admission:  hypertensive urgency     Significant PMH/Disease States:   Past Medical History:   Diagnosis Date    Asthma     GERD (gastroesophageal reflux disease)     Hypertension        Allergies:  Amlodipine; Percocet [oxycodone-acetaminophen]; Sulfa (sulfonamide antibiotics); and Vicodin [hydrocodone-acetaminophen]    Prior to Admission Medications:   Prior to Admission Medications   Prescriptions Last Dose Informant Patient Reported? Taking? albuterol (PROAIR HFA) 90 mcg/actuation inhaler   Yes Yes   Sig: Take 2 Puffs by inhalation every four (4) hours as needed. albuterol-ipratropium (DUO-NEB) 2.5 mg-0.5 mg/3 ml nebu   Yes Yes   Sig: 3 mL by Nebulization route every six (6) hours as needed. carvedilol (COREG) 6.25 mg tablet 2/5/2019 at am  Yes Yes   Sig: Take 6.25 mg by mouth two (2) times daily (with meals). cetirizine (ZYRTEC) 10 mg tablet 2/5/2019  Yes Yes   Sig: Take 10 mg by mouth daily. hydrALAZINE (APRESOLINE) 10 mg tablet 2/5/2019 at am  Yes Yes   Sig: Take 5 mg by mouth three (3) times daily. (dose = 0.5 x 10 mg tablet)   hydroCHLOROthiazide (HYDRODIURIL) 25 mg tablet 2/5/2019  Yes Yes   Sig: Take 25 mg by mouth daily. lisinopril (PRINIVIL, ZESTRIL) 40 mg tablet 2/5/2019  Yes Yes   Sig: Take 40 mg by mouth daily. montelukast (SINGULAIR) 10 mg tablet 2/5/2019  Yes Yes   Sig: Take 10 mg by mouth daily.    omeprazole (PRILOSEC) 20 mg capsule 2/5/2019  Yes Yes   Sig: Take 20 mg by mouth Daily (before breakfast). potassium chloride (K-DUR, KLOR-CON) 20 mEq tablet 2/5/2019  Yes Yes   Sig: Take 20 mEq by mouth daily. sertraline (ZOLOFT) 50 mg tablet 2/5/2019  Yes Yes   Sig: Take 50 mg by mouth daily. Facility-Administered Medications: None         Thank you for allowing me to participate in the care of this patient. Please contact the pharmacy () or the medication reconciliation pharmacist () with any questions. Raúl Merino PharmToni AVITIA., BCPS, BCPPS

## 2019-02-06 ENCOUNTER — APPOINTMENT (OUTPATIENT)
Dept: NON INVASIVE DIAGNOSTICS | Age: 39
DRG: 305 | End: 2019-02-06
Attending: INTERNAL MEDICINE
Payer: SELF-PAY

## 2019-02-06 ENCOUNTER — TELEPHONE (OUTPATIENT)
Dept: SLEEP MEDICINE | Age: 39
End: 2019-02-06

## 2019-02-06 DIAGNOSIS — G47.419 NARCOLEPSY WITHOUT CATAPLEXY: Primary | ICD-10-CM

## 2019-02-06 LAB
ALBUMIN SERPL-MCNC: 3.1 G/DL (ref 3.5–5)
ALBUMIN/GLOB SERPL: 0.9 {RATIO} (ref 1.1–2.2)
ALP SERPL-CCNC: 70 U/L (ref 45–117)
ALT SERPL-CCNC: 15 U/L (ref 12–78)
AMPHET UR QL SCN: NEGATIVE
ANION GAP SERPL CALC-SCNC: 10 MMOL/L (ref 5–15)
ANION GAP SERPL CALC-SCNC: 12 MMOL/L (ref 5–15)
AST SERPL-CCNC: 12 U/L (ref 15–37)
BARBITURATES UR QL SCN: NEGATIVE
BENZODIAZ UR QL: NEGATIVE
BILIRUB SERPL-MCNC: 0.2 MG/DL (ref 0.2–1)
BUN SERPL-MCNC: 10 MG/DL (ref 6–20)
BUN SERPL-MCNC: 9 MG/DL (ref 6–20)
BUN/CREAT SERPL: 11 (ref 12–20)
BUN/CREAT SERPL: 13 (ref 12–20)
CALCIUM SERPL-MCNC: 8.7 MG/DL (ref 8.5–10.1)
CALCIUM SERPL-MCNC: 9 MG/DL (ref 8.5–10.1)
CANNABINOIDS UR QL SCN: NEGATIVE
CHLORIDE SERPL-SCNC: 102 MMOL/L (ref 97–108)
CHLORIDE SERPL-SCNC: 103 MMOL/L (ref 97–108)
CHOLEST SERPL-MCNC: 162 MG/DL
CO2 SERPL-SCNC: 26 MMOL/L (ref 21–32)
CO2 SERPL-SCNC: 27 MMOL/L (ref 21–32)
COCAINE UR QL SCN: NEGATIVE
CREAT SERPL-MCNC: 0.77 MG/DL (ref 0.55–1.02)
CREAT SERPL-MCNC: 0.82 MG/DL (ref 0.55–1.02)
CREAT UR-MCNC: 93.5 MG/DL
DRUG SCRN COMMENT,DRGCM: NORMAL
ECHO AV AREA PEAK VELOCITY: 2.7 CM2
ECHO AV PEAK GRADIENT: 9.5 MMHG
ECHO AV PEAK VELOCITY: 154.15 CM/S
ECHO LA AREA 4C: 14.1 CM2
ECHO LA MAJOR AXIS: 2.93 CM
ECHO LA VOL 2C: 43.04 ML (ref 22–52)
ECHO LA VOL 4C: 30.48 ML (ref 22–52)
ECHO LA VOL BP: 40.2 ML (ref 22–52)
ECHO LA VOL/BSA BIPLANE: 21.75 ML/M2 (ref 16–28)
ECHO LA VOLUME INDEX A2C: 23.29 ML/M2 (ref 16–28)
ECHO LA VOLUME INDEX A4C: 16.49 ML/M2 (ref 16–28)
ECHO LV E' LATERAL VELOCITY: 9.27 CM/S
ECHO LV E' SEPTAL VELOCITY: 8.52 CM/S
ECHO LV INTERNAL DIMENSION DIASTOLIC: 4.49 CM (ref 3.9–5.3)
ECHO LV INTERNAL DIMENSION SYSTOLIC: 2.85 CM
ECHO LV IVSD: 1.15 CM (ref 0.6–0.9)
ECHO LV MASS 2D: 237.1 G (ref 67–162)
ECHO LV MASS INDEX 2D: 128.3 G/M2 (ref 43–95)
ECHO LV POSTERIOR WALL DIASTOLIC: 1.28 CM (ref 0.6–0.9)
ECHO LVOT DIAM: 1.93 CM
ECHO LVOT PEAK GRADIENT: 7.8 MMHG
ECHO LVOT PEAK VELOCITY: 139.71 CM/S
ECHO MV A VELOCITY: 89.64 CM/S
ECHO MV AREA PHT: 7.8 CM2
ECHO MV E DECELERATION TIME (DT): 97.1 MS
ECHO MV E VELOCITY: 0.97 CM/S
ECHO MV E/A RATIO: 0.01
ECHO MV E/E' LATERAL: 0.1
ECHO MV E/E' RATIO (AVERAGED): 0.11
ECHO MV E/E' SEPTAL: 0.11
ECHO MV PRESSURE HALF TIME (PHT): 28.2 MS
ECHO PULMONARY ARTERY SYSTOLIC PRESSURE (PASP): 17 MMHG
ECHO RV INTERNAL DIMENSION: 2.58 CM
ECHO RV TAPSE: 2.25 CM (ref 1.5–2)
ECHO RVOT PEAK VELOCITY: 86.32 CM/S
ECHO TV REGURGITANT MAX VELOCITY: 179.65 CM/S
ECHO TV REGURGITANT PEAK GRADIENT: 12.9 MMHG
ERYTHROCYTE [DISTWIDTH] IN BLOOD BY AUTOMATED COUNT: 13.1 % (ref 11.5–14.5)
GLOBULIN SER CALC-MCNC: 3.5 G/DL (ref 2–4)
GLUCOSE SERPL-MCNC: 152 MG/DL (ref 65–100)
GLUCOSE SERPL-MCNC: 156 MG/DL (ref 65–100)
HCT VFR BLD AUTO: 34.8 % (ref 35–47)
HDLC SERPL-MCNC: 33 MG/DL
HDLC SERPL: 4.9 {RATIO} (ref 0–5)
HGB BLD-MCNC: 10.7 G/DL (ref 11.5–16)
LDLC SERPL CALC-MCNC: 108.2 MG/DL (ref 0–100)
LIPID PROFILE,FLP: ABNORMAL
MCH RBC QN AUTO: 27.4 PG (ref 26–34)
MCHC RBC AUTO-ENTMCNC: 30.7 G/DL (ref 30–36.5)
MCV RBC AUTO: 89.2 FL (ref 80–99)
METHADONE UR QL: NEGATIVE
NRBC # BLD: 0 K/UL (ref 0–0.01)
NRBC BLD-RTO: 0 PER 100 WBC
OPIATES UR QL: NEGATIVE
PCP UR QL: NEGATIVE
PLATELET # BLD AUTO: 266 K/UL (ref 150–400)
PMV BLD AUTO: 10.8 FL (ref 8.9–12.9)
POTASSIUM SERPL-SCNC: 2.7 MMOL/L (ref 3.5–5.1)
POTASSIUM SERPL-SCNC: 2.9 MMOL/L (ref 3.5–5.1)
POTASSIUM UR-SCNC: 46 MMOL/L
PROT SERPL-MCNC: 6.6 G/DL (ref 6.4–8.2)
PROT UR-MCNC: 15 MG/DL (ref 0–11.9)
PROT/CREAT UR-RTO: 0.2
RBC # BLD AUTO: 3.9 M/UL (ref 3.8–5.2)
SODIUM SERPL-SCNC: 139 MMOL/L (ref 136–145)
SODIUM SERPL-SCNC: 141 MMOL/L (ref 136–145)
TRIGL SERPL-MCNC: 104 MG/DL (ref ?–150)
TSH SERPL DL<=0.05 MIU/L-ACNC: 1.53 UIU/ML (ref 0.36–3.74)
VLDLC SERPL CALC-MCNC: 20.8 MG/DL
WBC # BLD AUTO: 8.9 K/UL (ref 3.6–11)

## 2019-02-06 PROCEDURE — 74011250636 HC RX REV CODE- 250/636: Performed by: INTERNAL MEDICINE

## 2019-02-06 PROCEDURE — 80061 LIPID PANEL: CPT

## 2019-02-06 PROCEDURE — 77010033678 HC OXYGEN DAILY

## 2019-02-06 PROCEDURE — 74011250637 HC RX REV CODE- 250/637: Performed by: INTERNAL MEDICINE

## 2019-02-06 PROCEDURE — 84133 ASSAY OF URINE POTASSIUM: CPT

## 2019-02-06 PROCEDURE — 80307 DRUG TEST PRSMV CHEM ANLYZR: CPT

## 2019-02-06 PROCEDURE — 82088 ASSAY OF ALDOSTERONE: CPT

## 2019-02-06 PROCEDURE — 77030029684 HC NEB SM VOL KT MONA -A

## 2019-02-06 PROCEDURE — 65660000000 HC RM CCU STEPDOWN

## 2019-02-06 PROCEDURE — 85027 COMPLETE CBC AUTOMATED: CPT

## 2019-02-06 PROCEDURE — 74011000250 HC RX REV CODE- 250: Performed by: INTERNAL MEDICINE

## 2019-02-06 PROCEDURE — 74011250637 HC RX REV CODE- 250/637: Performed by: HOSPITALIST

## 2019-02-06 PROCEDURE — 94762 N-INVAS EAR/PLS OXIMTRY CONT: CPT

## 2019-02-06 PROCEDURE — 36415 COLL VENOUS BLD VENIPUNCTURE: CPT

## 2019-02-06 PROCEDURE — 84156 ASSAY OF PROTEIN URINE: CPT

## 2019-02-06 PROCEDURE — 94664 DEMO&/EVAL PT USE INHALER: CPT

## 2019-02-06 PROCEDURE — 94640 AIRWAY INHALATION TREATMENT: CPT

## 2019-02-06 PROCEDURE — 93306 TTE W/DOPPLER COMPLETE: CPT

## 2019-02-06 PROCEDURE — 84443 ASSAY THYROID STIM HORMONE: CPT

## 2019-02-06 PROCEDURE — 80053 COMPREHEN METABOLIC PANEL: CPT

## 2019-02-06 RX ORDER — CARVEDILOL 12.5 MG/1
25 TABLET ORAL 2 TIMES DAILY WITH MEALS
Status: DISCONTINUED | OUTPATIENT
Start: 2019-02-07 | End: 2019-02-07 | Stop reason: HOSPADM

## 2019-02-06 RX ORDER — SPIRONOLACTONE 25 MG/1
25 TABLET ORAL
Status: COMPLETED | OUTPATIENT
Start: 2019-02-06 | End: 2019-02-06

## 2019-02-06 RX ORDER — ACETAMINOPHEN 325 MG/1
650 TABLET ORAL
Status: DISCONTINUED | OUTPATIENT
Start: 2019-02-06 | End: 2019-02-07 | Stop reason: HOSPADM

## 2019-02-06 RX ORDER — SPIRONOLACTONE 25 MG/1
25 TABLET ORAL DAILY
Status: DISCONTINUED | OUTPATIENT
Start: 2019-02-07 | End: 2019-02-07 | Stop reason: HOSPADM

## 2019-02-06 RX ORDER — POTASSIUM CHLORIDE 750 MG/1
40 TABLET, FILM COATED, EXTENDED RELEASE ORAL
Status: COMPLETED | OUTPATIENT
Start: 2019-02-06 | End: 2019-02-06

## 2019-02-06 RX ORDER — POTASSIUM CHLORIDE 14.9 MG/ML
10 INJECTION INTRAVENOUS
Status: DISCONTINUED | OUTPATIENT
Start: 2019-02-06 | End: 2019-02-06

## 2019-02-06 RX ORDER — CARVEDILOL 12.5 MG/1
12.5 TABLET ORAL 2 TIMES DAILY WITH MEALS
Status: DISCONTINUED | OUTPATIENT
Start: 2019-02-06 | End: 2019-02-06

## 2019-02-06 RX ADMIN — ACETAMINOPHEN 650 MG: 325 TABLET ORAL at 09:28

## 2019-02-06 RX ADMIN — LABETALOL 20 MG/4 ML (5 MG/ML) INTRAVENOUS SYRINGE 20 MG: at 11:06

## 2019-02-06 RX ADMIN — Medication 10 ML: at 05:37

## 2019-02-06 RX ADMIN — ACETAMINOPHEN 650 MG: 325 TABLET ORAL at 17:12

## 2019-02-06 RX ADMIN — SPIRONOLACTONE 25 MG: 25 TABLET ORAL at 16:52

## 2019-02-06 RX ADMIN — Medication 10 ML: at 23:41

## 2019-02-06 RX ADMIN — CETIRIZINE HYDROCHLORIDE 10 MG: 10 TABLET, FILM COATED ORAL at 09:23

## 2019-02-06 RX ADMIN — PANTOPRAZOLE SODIUM 40 MG: 40 TABLET, DELAYED RELEASE ORAL at 07:04

## 2019-02-06 RX ADMIN — CARVEDILOL 6.25 MG: 6.25 TABLET, FILM COATED ORAL at 07:04

## 2019-02-06 RX ADMIN — IPRATROPIUM BROMIDE AND ALBUTEROL SULFATE 3 ML: .5; 3 SOLUTION RESPIRATORY (INHALATION) at 02:21

## 2019-02-06 RX ADMIN — POTASSIUM CHLORIDE 40 MEQ: 750 TABLET, EXTENDED RELEASE ORAL at 05:35

## 2019-02-06 RX ADMIN — MONTELUKAST SODIUM 10 MG: 10 TABLET, FILM COATED ORAL at 09:23

## 2019-02-06 RX ADMIN — ENOXAPARIN SODIUM 40 MG: 40 INJECTION SUBCUTANEOUS at 18:52

## 2019-02-06 RX ADMIN — Medication 10 ML: at 02:08

## 2019-02-06 RX ADMIN — CARVEDILOL 12.5 MG: 12.5 TABLET, FILM COATED ORAL at 16:52

## 2019-02-06 RX ADMIN — Medication 10 ML: at 14:22

## 2019-02-06 RX ADMIN — IPRATROPIUM BROMIDE AND ALBUTEROL SULFATE 3 ML: .5; 3 SOLUTION RESPIRATORY (INHALATION) at 08:45

## 2019-02-06 RX ADMIN — IPRATROPIUM BROMIDE AND ALBUTEROL SULFATE 3 ML: .5; 3 SOLUTION RESPIRATORY (INHALATION) at 14:27

## 2019-02-06 RX ADMIN — IPRATROPIUM BROMIDE AND ALBUTEROL SULFATE 3 ML: .5; 3 SOLUTION RESPIRATORY (INHALATION) at 19:50

## 2019-02-06 RX ADMIN — HYDROCHLOROTHIAZIDE 25 MG: 25 TABLET ORAL at 09:23

## 2019-02-06 RX ADMIN — LABETALOL 20 MG/4 ML (5 MG/ML) INTRAVENOUS SYRINGE 20 MG: at 05:35

## 2019-02-06 RX ADMIN — LISINOPRIL 40 MG: 20 TABLET ORAL at 09:22

## 2019-02-06 RX ADMIN — SERTRALINE 50 MG: 50 TABLET, FILM COATED ORAL at 09:22

## 2019-02-06 RX ADMIN — Medication 5 ML: at 14:00

## 2019-02-06 RX ADMIN — POTASSIUM CHLORIDE 10 MEQ: 200 INJECTION, SOLUTION INTRAVENOUS at 04:42

## 2019-02-06 RX ADMIN — LABETALOL 20 MG/4 ML (5 MG/ML) INTRAVENOUS SYRINGE 20 MG: at 17:11

## 2019-02-06 NOTE — CONSULTS
253005- consult dictated    Resistant longstanding HTN presenting with hypertensive urgency associated with Hypokalemia  Thyroid and renal fxn are nl. UA without proteinuria  Aggravating factors includes obesity, ?high salt (has cut out intake)  Had recent sleep study as outpt- needs to be followed up    DDx includes KYLE (due to FMD) high on list vs Primary hyperaldo    P:  Ct current BP meds.  Can titrate Coreg further as needed  Once Mitchell/Renin have been drawn, can start Aldactone  Check MRA of renal arteries to look for KYLE  F/u on sleep study results  Counseled on lifestyle changes  F/u on Echo results    Hannah Pat MD  [address]

## 2019-02-06 NOTE — CONSULTS
[address]    Name:  Alireza Levine  MR#:  [id number]  :  1980  ACCOUNT #:  [de-identified]  DATE OF SERVICE:  2019      ATTENDING PHYSICIAN:  Leander Mascorro MD    REASON FOR CONSULTATION:  This consultation is requested by  Dr. Leander Mascorro for evaluation and management of  difficult to control hypertension. The patient was seen and examined. History obtained from  the patient and chart review. HISTORY OF PRESENT ILLNESS:  The patient is a 49-year-old  [facility] American female with past medical history  significant for longstanding hypertension, asthma who  presented to ED with chief complaint of 1 week history of  worsening intermittent headaches. She has been treated for  hypertension for last 10 years and lately it has been  difficult to control requiring titration of multiple BP meds  as outpatient. She used to live in [address] [place] [address] and was followed by  Dr. Claire Guy over there. She recently moved to [facility] and  has a new PCP in [place].  Her lisinopril was  increased from 20 to 40 mg approximately 2-3 months ago and  her Coreg was increased from 3.1 to 5 mg to 6.25 mg b.i.d.  approximately 1 month ago. The patient has been working on cutting out her salt intake. She has underlying obesity. She recently has been tested  for sleep apnea and the results are pending. Her renal and  thyroid function has been normal based on the blood test.   Her urine does not show any proteinuria. She denies any  excessive caffeine intake. She does not use nonsteroidals  on a chronic basis. No alcohol use. Denies any illicit  drug use. She works as a CNA, and has to work nightshifts many time. Clonidine was making her sleepy and has been discontinued in  the past.    She presented with hypertensive urgency and was treated with  multiple IV medications. Currently she is on a regimen of 4  oral meds.   Her initial CT head was negative for any acute  abnormalities. BP has improved, but still systolic blood  pressures are in 170s to even 180s. She does not take any over-the-counter natural or herbal  meds. She has had intermittent hypokalemia noted on her lab  test.  She has been given potassium supplementation as  outpatient. She is not on any Aldactone. Her potassium  from today morning was 2.7. Her lipid panel is reasonable. She reports going to ER at least 6 times in last 1-2 years  with similar presentation, mainly with headaches and  increased hypertension. She has not seen any nephrologist  before. She just returned from an echocardiogram, with  results pending. PAST MEDICAL HISTORY:  1. Hypertension. 2.  Asthma. 3.  GERD. PAST SURGICAL HISTORY:  Removal of ovarian cyst.    Allergies   Allergen Reactions    Amlodipine Other (comments)     Pt states it makes her BP higher    Percocet [Oxycodone-Acetaminophen] Itching    Sulfa (Sulfonamide Antibiotics) Itching    Vicodin [Hydrocodone-Acetaminophen] Itching     Medications Prior to Admission   Medication Sig    hydroCHLOROthiazide (HYDRODIURIL) 25 mg tablet Take 25 mg by mouth daily.  lisinopril (PRINIVIL, ZESTRIL) 40 mg tablet Take 40 mg by mouth daily.  cetirizine (ZYRTEC) 10 mg tablet Take 10 mg by mouth daily.  sertraline (ZOLOFT) 50 mg tablet Take 50 mg by mouth daily.  montelukast (SINGULAIR) 10 mg tablet Take 10 mg by mouth daily.  albuterol-ipratropium (DUO-NEB) 2.5 mg-0.5 mg/3 ml nebu 3 mL by Nebulization route every six (6) hours as needed.  albuterol (PROAIR HFA) 90 mcg/actuation inhaler Take 2 Puffs by inhalation every four (4) hours as needed.  potassium chloride (K-DUR, KLOR-CON) 20 mEq tablet Take 20 mEq by mouth daily.  carvedilol (COREG) 6.25 mg tablet Take 6.25 mg by mouth two (2) times daily (with meals).  hydrALAZINE (APRESOLINE) 10 mg tablet Take 5 mg by mouth three (3) times daily.  (dose = 0.5 x 10 mg tablet)    omeprazole (PRILOSEC) 20 mg capsule Take 20 mg by mouth Daily (before breakfast). Social History     Tobacco Use    Smoking status: Current Every Day Smoker     Packs/day: 0.50    Smokeless tobacco: Never Used   Substance Use Topics    Alcohol use: Yes     Comment: socially    Drug use: No     Family History   Problem Relation Age of Onset    Diabetes Mother     Asthma Father          REVIEW OF SYSTEMS:  She is feeling overall better. Headaches have improved and none at present. No blurry or  double vision. No focal weakness. No chest pain, dyspnea,  leg edema. No orthopnea or PND. No syncope. No delusions  or hallucinations. No jaundice or hepatitis. Does not seem  to be anxious. PHYSICAL EXAMINATION:  GENERAL:  Young female, well built, well nourished, in no  acute distress. VITAL SIGNS:  She is afebrile, pulse 78, most recent   to 526 systolic and 348 to 150 diastolic, respiration of 16,  saturating 100% on room air. Weight is 86.2 kg. HEENT:  Head is atraumatic, normocephalic. No scleral  icterus. Conjunctivae pink. Mucous membrane is moist.  NECK:  No JVD, no carotid bruits, no cervical  lymphadenopathy. LUNGS:  Clear to auscultation. No respiratory distress. Good breath sounds. HEART:  Regular rate and rhythm, normal S1, S2. No murmur. No rub. ABDOMEN:  Soft, nontender, no abdominal bruit, no  hepatosplenomegaly, normal bowel sounds. EXTREMITIES:  No clubbing, cyanosis or edema. Peripheral  pulses are intact. NEUROLOGIC:  CNS, alert and oriented x3. PSYCHIATRIC:  Normal mood and affect. Not anxious or  agitated. LABORATORY DATA:  CBC is essentially normal with mild anemia  with a hemoglobin of 10.7.  UA on admit was essentially  normal without any proteinuria or hematuria. BUN 10,  creatinine 0.77, potassium was low at 2.7, it has been  intermittently low even on previous lab dating back November 2017. TSH was normal.  CT head without any acute process.    Echo is pending at the time of dictation. ASSESSMENT:  1. Hypertensive urgency on presentation. 2.  Hypokalemia. Given difficult to control hypertension on multiple  medications associated with hypokalemia, I suspect renal  artery stenosis from fibromuscular dysplasia highly on the  differential.  Other potential cause would be primary  hyperaldosteronism. Aldosterone-renin interpretation will  be limited as she is on lisinopril, but has been ordered. She is on a couple of different medications and we have room  to increase carvedilol further as her heart rate is still in  70s to 80s. Once her workup has been ordered, we can  consider starting spironolactone. RECOMMENDATIONS:  1. We will order MRA of renal arteries to screen for renal  artery stenosis. 2.  Use p.r.n. hydralazine for breakthrough hypertension. 3.  Coreg was just increased today and can be further  increased to 25 mg b.i.d.  4.  Aldosterone and renin have been drawn, we will order  Aldactone as early as tomorrow. 5.  Followup on results of echo. 6.  Counseled on importance of lifestyle changes including  low-salt diet, increased activity, weight loss. 7.  Need to followup on results of sleep study, which was  recently done as outpatient. 8.  Further diagnostic studies and/or treatment plans after  results from initial test.    Discussed with the patient,  and Dr. Cate Flores. Thanks for the renal consult. We will follow the patient  with you.         Cherelle Angelo MD      [id number]  D:  02/06/2019 11:51  T:  02/06/2019 17:28  JOB #:  [id number]

## 2019-02-06 NOTE — PROGRESS NOTES
Bedside and Verbal shift change report given to Trinidad Poon (oncoming nurse) by Rafael Nelson RN (offgoing nurse). Report included the following information SBAR, Kardex, Intake/Output, MAR, Recent Results and Cardiac Rhythm nsr.

## 2019-02-06 NOTE — ROUTINE PROCESS
TRANSFER - OUT REPORT:    Verbal report given to Lary(name) on [address]  being transferred to 6S(unit) for routine progression of care       Report consisted of patients Situation, Background, Assessment and   Recommendations(SBAR). Information from the following report(s) ED Summary and Recent Results was reviewed with the receiving nurse. Lines:   Peripheral IV 02/05/19 Left Hand (Active)   Site Assessment Clean, dry, & intact 2/5/2019  2:16 PM   Phlebitis Assessment 0 2/5/2019  2:16 PM   Infiltration Assessment 0 2/5/2019  2:16 PM   Dressing Status Clean, dry, & intact 2/5/2019  2:16 PM   Hub Color/Line Status Pink 2/5/2019  2:16 PM        Opportunity for questions and clarification was provided.       Patient transported with:   [employer]

## 2019-02-06 NOTE — PROGRESS NOTES
Care Management Interventions  PCP Verified by CM: Yes  Palliative Care Criteria Met (RRAT>21 & CHF Dx)?: No  Mode of Transport at Discharge: Other (see comment)  MyChart Signup: No  Discharge Durable Medical Equipment: No  Health Maintenance Reviewed: Yes  Physical Therapy Consult: No  Occupational Therapy Consult: No  Speech Therapy Consult: No  Current Support Network: Own Home, Lives with Spouse  Confirm Follow Up Transport: Family  Plan discussed with Pt/Family/Caregiver: Yes  [place] Resource Information Provided?: No  Discharge Location  Discharge Placement: Home with family assistance    Reason for Admission:    CVA                   RRAT Score:          5           Plan for utilizing home health:     No needs at this time. Likelihood of Readmission:   low                         Transition of Care Plan:             Chart reviewed for transitions of care, and discussed in rounds. CM met with patient and her , Meg Lakhani at bedside to explain role and offer support. Patient is alert and oriented x4, and confirmed demographics with some changes to be updated in [employer]. Her PCP is NP, Loretta Mark and will be updated in chart She lives with Meg Lakhani in their home and he will transport at time of discharge. Cm will follow for any other discharge needs.     Mickeal Aase, [facility]

## 2019-02-06 NOTE — PROGRESS NOTES
Problem: Falls - Risk of  Goal: *Absence of Falls  Document Suman Fall Risk and appropriate interventions in the flowsheet. Outcome: Progressing Towards Goal  Fall Risk Interventions:            Medication Interventions: Evaluate medications/consider consulting pharmacy, Teach patient to arise slowly                  Problem: Pressure Injury - Risk of  Goal: *Prevention of pressure injury  Document Jeison Scale and appropriate interventions in the flowsheet.   Outcome: Progressing Towards Goal  Pressure Injury Interventions:

## 2019-02-06 NOTE — H&P
[address]  HISTORY AND PHYSICAL Name:  Marisol Alanis 
MR#:  [id number] :  1980 ACCOUNT #:  [de-identified] ADMIT DATE:  2019 TIME OF DICTATION:  1698 hours. PRIMARY CARE PHYSICIAN:  Seng Olivares MD 
 
CHIEF COMPLAINT:  Headache. HISTORY OF PRESENTING ILLNESS:  A 55-year-old female with a 
past medical history significant for uncontrolled primary 
hypertension and asthma,  was brought to the emergency 
room for evaluation of a one-week history of worsening intermittent 
headaches. The patient stated that she was seen by her 
primary care physician for the uncontrolled hypertension and 
was started on multiple oral medications with no significant Improvement in blood pressure readings. The patient complained of some intermittent 
dizziness, but denied any visual deficits, chest pains, 
palpitations, shortness of breath, cough, sputum production, 
fever, chills, nausea, vomiting, abdominal pain, bladder or 
bowel irregularities. PAST MEDICAL HISTORY: 1.  Primary hypertension. 2.  Asthma. 3.  Gastroesophageal reflux disease. PAST SURGICAL HISTORY:  Ovarian cyst removal. 
 
HOME MEDICATIONS: 
1. Albuterol inhaler 2 puffs every 4 hours as needed. 2.  DuoNeb every 6 hourly as needed for wheeze and shortness 
of breath. 3.  Coreg 6.25 mg one tablet p.o. twice a day with meals. 4.  Zyrtec 10 mg p.o. daily. 5.  Hydralazine 5 mg p.o. 3 times a day. 6.  Hydrochlorothiazide 25 mg p.o. daily. 7.  Lisinopril 40 mg p.o. daily. 8.  Singulair 10 mg p.o. daily. 9.  Prilosec 20 mg p.o. before breakfast. 
10.  Potassium chloride 20 mEq p.o. daily. 11.  Zoloft 50 mg p.o. daily. ALLERGIES: 
1. AMLODIPINE. 2.  PERCOCET. 3.  SULFA DRUGS. 4.  VICODIN. SOCIAL HISTORY:  Significant for being . Lives at 
home with her . She works as a CNA. Admits to 
smoking a pack of cigarettes daily. Denies alcohol use disorder. Denies IV or illicit drug use. FAMILY HISTORY:  The patient's family history was reviewed 
and positive for diabetes on the maternal side of the family 
and asthma on the paternal side of the family. REVIEW OF SYSTEMS: 
GENERAL:  The patient with no recent changes in appetite or 
weight loss. No fever or chills. HEENT:  Negative for ear pain, facial pain, sore throat, or 
difficulty in swallowing. EYES:  Negative for pain. Negative for visual disturbance. RESPIRATORY:  Negative for shortness of breath, cough, or 
sputum production. CARDIOVASCULAR:  Negative for chest pain or palpitations. GASTROINTESTINAL:  Negative for nausea, vomiting, abdominal 
pain, diarrhea, or constipation. GENITOURINARY:  Negative for difficulty in urination or 
hematuria. MUSCULOSKELETAL:  Negative for arthralgias. Negative for 
joint pain or stiffness. SKIN:  Negative for any rashes or petechiae. NEUROLOGIC:  Positive for headaches and dizziness. Negative for confusion. PSYCHIATRIC:  Negative for hallucinations, delusions or 
psychosis. PHYSICAL EXAMINATION: 
GENERAL:  The patient was awake, alert, not in any distress. Her blood pressure was 178/134, heart rate 75, respiratory 
rate 16, afebrile, saturating 97% on room air. HEAD:  Normocephalic. Pupils were equal and reactive to Light. No nystagmus. ENT:  Ear, nose, and throat clear. NECK:  No jugular venous distention. No carotid bruits. CARDIOVASCULAR EXAM:  S1 and S2 present. RESPIRATORY:  Lungs with decreased air entry at both bases 
without any crepitations or rhonchi. GASTROINTESTINAL:  Bowel sounds present. The abdomen is 
soft and nontender. No rebound and no guarding. MUSCULOSKELETAL:  No asymmetry of the extremities. CENTRAL NERVOUS SYSTEM:  The patient is awake, alert, 
oriented to time, place, and person. Sensation intact. LABORATORY/RADIOGRAPHIC FINDINGS:  As follows.   A CT of the 
 head without contrast that did not show any acute 
intracranial process. A 12-lead EKG with normal sinus 
rhythm at 74 per minute with posterior fascicular block. A 
metabolic panel with a sodium of 139, a potassium of 3.1, 
chloride of 100, bicarbonate of 29, BUN of 7, creatinine of 
0.65, glucose of 104, calcium 8.9 with an albumin of 3.5. Normal liver function tests. CBC with a WBC of 8.1, 
hemoglobin 11.7, hematocrit 37.6 with a platelet count of 
643. MCV of 88.5. ASSESSMENT AND PLAN: 
1. Cardiovascular: The patient will be admitted to the 
inpatient level, telemetry floor, for management of 
hypertensive urgency with a history of uncontrolled primary 
hypertension. We will initiate intravenous labetalol and intravenous 
hydralazine and clonidine in addition to continuation of oral 
antihypertensives in an attempt to optimize blood pressure 
control. We will obtain a 2D echocardiogram to evaluate for 
any hypertensive heart disease. As needed cardiology 
consult. 2.  Respiratory: Moderate intermittent asthma without any 
acute exacerbation. We will continue inhalers, nebulizers 
and encourage incentive spirometry. 3.  Gastrointestinal.  History of gastroesophageal reflux 
disease. 4.  Endocrine:  Obesity with a body mass index greater than 35. Therapeutic life style changes. Counseling done. 5.  Prophylaxis for deep venous thrombosis. 6.  Ongoing tobacco abuse. Cessation counseling done. Nicotine replacement therapy. 7.  Directives. Full code with a guarded prognosis. D/W patient. SUMMARY:  A 25-year-old female with a past medical history 
significant for uncontrolled primary hypertension, chronic 
asthma, and gastroesophageal reflux disease, is being 
admitted to the inpatient level  telemetry floor 
for management of hypertensive urgency without any acute 
end-organ dysfunction. The patient is at increased risk for further decompensation and will benefit from inpatient management including with intravenous antihypertensive 
therapy in an attempt to optimize blood pressure control, 
and a 2D echocardiogram to evaluate for any hypertensive 
heart disease and possibly cardiology and nephrology 
consults if persistent accelerated hypertension The entire admission plan discussed in detail with the 
patient and the patient's  Agnes Kennedy, who was at bedside 
and can be reached at cell [phone]. All questions and  
concerns were addressed. Total time for admission 45 minutes of which at least 50% of 
the time was spent on plan of care and counseling of the 
patient. Diann Garza MD 
 
 
[id number] 
D:  02/05/2019 17:02 
T:  02/05/2019 19:39 
JOB #:  [id number]

## 2019-02-06 NOTE — PROGRESS NOTES
Hospitalist Progress Note  Alma Stanley MD  Answering service: [phone] from in house phone      Date of Service:  2019  NAME:  Wali Manuel  :  1980  MRN:  [id number]      Admission Summary:   45 y.o F with PMH of uncontrolled HTN was admitted due to HTN urgency and headache. Interval history / Subjective:       F/u for uncontrolled HTN    Patient still has headache. Denied any vision changes or chest pain or SOB. Patient states she has HTN for few years but recently  her BP is uncontrolled despite taking multiple antihypertensive meds(4 agents) at home every day. Her antihypertensive doses were increased in the last 1-2 months. Assessment & Plan:     #Uncontrolled HTN:Hypertensive urgency (POA)  -on amlodipine,lisinopril. Increased coreg to 12.5 mg BID and spironolactone was added in the evening as BP still remains high 170s/90-100s. -As she has hypokalemia - hyperaldosteronism or renal artery stenosis must be ruled out. -Nephrology consulted  -Will follow up MRA abdomen results.  -Echo shows grade 2 diastolic dysfunction likely due to uncontrolled HTN  -patient also recently had WILLIAMS eval as OP. Sleep study results pending.  -ordered aldosterone/renin level.  -Random urine potassium and urine protein wnl.  -will use prn IV hydralazine. #Headaches:  -due to uncontrolled HTN  -CT head yesterday did not show any acute abnormalities. #Hypokalemia:repleted  -will recheck labs. #Morbid obesity:  -Body mass index is 35.9 kg/m². -encouraged weight loss - diet and exercise. Code status: Full  DVT prophylaxis: heparin    Care Plan discussed with: Patient/Family and Nurse and Crissie Nicely.   Disposition: Patient has uncontrolled HTN with multiple medications - will need to follow pending results and BP control prior to discharge  TBD     Hospital Problems  Date Reviewed: 2019          Codes Class Noted POA    Hypertensive urgency ICD-10-CM: I16.0  ICD-9-CM: 401.9  2/5/2019 Unknown                Review of Systems:   Pertinent items are noted in HPI. Vital Signs:    Last 24hrs VS reviewed since prior progress note. Most recent are:  Visit Vitals  BP (!) 170/98 (BP 1 Location: Right arm, BP Patient Position: At rest)   Pulse 89   Temp 99 °F (37.2 °C)   Resp 15   Ht 5' 1\" (1.549 m)   Wt 86.2 kg (190 lb)   SpO2 97%   BMI 35.90 kg/m²       No intake or output data in the 24 hours ending 02/06/19 1501     Physical Examination:             Constitutional:  No acute distress, cooperative, pleasant    ENT:  Oral mucous moist, oropharynx benign. Neck supple,    Resp:  CTA bilaterally. No wheezing/rhonchi/rales. No accessory muscle use   CV:  Regular rhythm, normal rate, no murmurs, gallops, rubs    GI:  Soft, non distended,obese abdomen, non tender. normoactive bowel sounds, no hepatosplenomegaly     Musculoskeletal:  No edema, warm, 2+ pulses throughout    Neurologic:  Moves all extremities. AAOx3, CN II-XII reviewed            Data Review:    Review and/or order of clinical lab test  Review and/or order of tests in the radiology section of CPT  Review and/or order of tests in the medicine section of CPT      Labs:     Recent Labs     02/06/19  0219 02/05/19  1414   WBC 8.9 8.1   HGB 10.7* 11.7   HCT 34.8* 37.6    291     Recent Labs     02/06/19  0219 02/05/19  1414    139   K 2.7* 3.1*    100   CO2 27 29   BUN 10 7   CREA 0.77 0.65   * 104*   CA 9.0 8.9     Recent Labs     02/06/19  0219 02/05/19  1414   SGOT 12* 14*   ALT 15 21   AP 70 61   TBILI 0.2 0.4   TP 6.6 7.4   ALB 3.1* 3.5   GLOB 3.5 3.9     No results for input(s): INR, PTP, APTT in the last 72 hours. No lab exists for component: INREXT   No results for input(s): FE, TIBC, PSAT, FERR in the last 72 hours. No results found for: FOL, RBCF   No results for input(s): PH, PCO2, PO2 in the last 72 hours.   No results for input(s): CPK, CKNDX, TROIQ in the last 72 hours.     No lab exists for component: CPKMB  Lab Results   Component Value Date/Time    Cholesterol, total 162 02/06/2019 02:19 AM    HDL Cholesterol 33 02/06/2019 02:19 AM    LDL, calculated 108.2 (H) 02/06/2019 02:19 AM    Triglyceride 104 02/06/2019 02:19 AM    CHOL/HDL Ratio 4.9 02/06/2019 02:19 AM     No results found for: Wilber Ahmet  Lab Results   Component Value Date/Time    Color YELLOW/STRAW 02/05/2019 04:51 PM    Appearance CLEAR 02/05/2019 04:51 PM    Specific gravity 1.014 02/05/2019 04:51 PM    Specific gravity >1.030 (H) 01/03/2016 12:45 AM    pH (UA) 7.0 02/05/2019 04:51 PM    Protein NEGATIVE  02/05/2019 04:51 PM    Glucose NEGATIVE  02/05/2019 04:51 PM    Ketone NEGATIVE  02/05/2019 04:51 PM    Bilirubin NEGATIVE  02/05/2019 04:51 PM    Urobilinogen 0.2 02/05/2019 04:51 PM    Nitrites NEGATIVE  02/05/2019 04:51 PM    Leukocyte Esterase NEGATIVE  02/05/2019 04:51 PM    Epithelial cells FEW 03/04/2018 10:15 PM    Bacteria NEGATIVE  03/04/2018 10:15 PM    WBC 0-4 03/04/2018 10:15 PM    RBC 0-5 03/04/2018 10:15 PM         Medications Reviewed:     Current Facility-Administered Medications   Medication Dose Route Frequency    carvedilol (COREG) tablet 12.5 mg  12.5 mg Oral BID WITH MEALS    acetaminophen (TYLENOL) tablet 650 mg  650 mg Oral Q4H PRN    enoxaparin (LOVENOX) injection 40 mg  40 mg SubCUTAneous Q24H    nicotine (NICODERM CQ) 14 mg/24 hr patch 1 Patch  1 Patch TransDERmal DAILY    albuterol-ipratropium (DUO-NEB) 2.5 MG-0.5 MG/3 ML  3 mL Nebulization Q6H RT    albuterol (PROVENTIL VENTOLIN) nebulizer solution 2.5 mg  2.5 mg Nebulization Q4H PRN    labetalol (NORMODYNE;TRANDATE) 20 mg/4 mL (5 mg/mL) injection 20 mg  20 mg IntraVENous Q6H PRN    hydrALAZINE (APRESOLINE) 20 mg/mL injection 10 mg  10 mg IntraVENous Q6H PRN    sodium chloride (NS) flush 5-40 mL  5-40 mL IntraVENous Q8H    sodium chloride (NS) flush 5-40 mL  5-40 mL IntraVENous PRN  cetirizine (ZYRTEC) tablet 10 mg  10 mg Oral DAILY    hydroCHLOROthiazide (HYDRODIURIL) tablet 25 mg  25 mg Oral DAILY    lisinopril (PRINIVIL, ZESTRIL) tablet 40 mg  40 mg Oral DAILY    montelukast (SINGULAIR) tablet 10 mg  10 mg Oral DAILY    sertraline (ZOLOFT) tablet 50 mg  50 mg Oral DAILY    pantoprazole (PROTONIX) tablet 40 mg  40 mg Oral ACB     ______________________________________________________________________  EXPECTED LENGTH OF STAY: 2d 4h  ACTUAL LENGTH OF STAY:          1                 Batsheva Bardales MD

## 2019-02-07 ENCOUNTER — APPOINTMENT (OUTPATIENT)
Dept: MRI IMAGING | Age: 39
DRG: 305 | End: 2019-02-07
Attending: INTERNAL MEDICINE
Payer: SELF-PAY

## 2019-02-07 VITALS
WEIGHT: 193 LBS | BODY MASS INDEX: 36.44 KG/M2 | TEMPERATURE: 98 F | DIASTOLIC BLOOD PRESSURE: 94 MMHG | OXYGEN SATURATION: 96 % | HEART RATE: 82 BPM | HEIGHT: 61 IN | SYSTOLIC BLOOD PRESSURE: 162 MMHG | RESPIRATION RATE: 18 BRPM

## 2019-02-07 LAB
ANION GAP SERPL CALC-SCNC: 9 MMOL/L (ref 5–15)
BUN SERPL-MCNC: 11 MG/DL (ref 6–20)
BUN/CREAT SERPL: 14 (ref 12–20)
CALCIUM SERPL-MCNC: 8.7 MG/DL (ref 8.5–10.1)
CHLORIDE SERPL-SCNC: 104 MMOL/L (ref 97–108)
CO2 SERPL-SCNC: 27 MMOL/L (ref 21–32)
CREAT SERPL-MCNC: 0.8 MG/DL (ref 0.55–1.02)
GLUCOSE SERPL-MCNC: 121 MG/DL (ref 65–100)
POTASSIUM SERPL-SCNC: 3.4 MMOL/L (ref 3.5–5.1)
SODIUM SERPL-SCNC: 140 MMOL/L (ref 136–145)

## 2019-02-07 PROCEDURE — A9585 GADOBUTROL INJECTION: HCPCS | Performed by: HOSPITALIST

## 2019-02-07 PROCEDURE — 94640 AIRWAY INHALATION TREATMENT: CPT

## 2019-02-07 PROCEDURE — 74011250637 HC RX REV CODE- 250/637: Performed by: INTERNAL MEDICINE

## 2019-02-07 PROCEDURE — 77030021566 MRA ABD W CONT

## 2019-02-07 PROCEDURE — 74011000250 HC RX REV CODE- 250: Performed by: INTERNAL MEDICINE

## 2019-02-07 PROCEDURE — 80048 BASIC METABOLIC PNL TOTAL CA: CPT

## 2019-02-07 PROCEDURE — 74011250636 HC RX REV CODE- 250/636: Performed by: HOSPITALIST

## 2019-02-07 PROCEDURE — 36415 COLL VENOUS BLD VENIPUNCTURE: CPT

## 2019-02-07 PROCEDURE — 74011250637 HC RX REV CODE- 250/637: Performed by: HOSPITALIST

## 2019-02-07 PROCEDURE — 74011000258 HC RX REV CODE- 258: Performed by: HOSPITALIST

## 2019-02-07 PROCEDURE — 82088 ASSAY OF ALDOSTERONE: CPT

## 2019-02-07 RX ORDER — CARVEDILOL 25 MG/1
25 TABLET ORAL 2 TIMES DAILY WITH MEALS
Qty: 60 TAB | Refills: 0 | OUTPATIENT
Start: 2019-02-07 | End: 2020-09-08

## 2019-02-07 RX ORDER — SPIRONOLACTONE 50 MG/1
50 TABLET, FILM COATED ORAL DAILY
Qty: 30 TAB | Refills: 0 | Status: SHIPPED | OUTPATIENT
Start: 2019-02-08 | End: 2019-10-31

## 2019-02-07 RX ADMIN — IPRATROPIUM BROMIDE AND ALBUTEROL SULFATE 3 ML: .5; 3 SOLUTION RESPIRATORY (INHALATION) at 14:16

## 2019-02-07 RX ADMIN — SODIUM CHLORIDE 60 ML: 900 INJECTION, SOLUTION INTRAVENOUS at 12:00

## 2019-02-07 RX ADMIN — LISINOPRIL 40 MG: 20 TABLET ORAL at 09:38

## 2019-02-07 RX ADMIN — MONTELUKAST SODIUM 10 MG: 10 TABLET, FILM COATED ORAL at 09:38

## 2019-02-07 RX ADMIN — PANTOPRAZOLE SODIUM 40 MG: 40 TABLET, DELAYED RELEASE ORAL at 06:51

## 2019-02-07 RX ADMIN — GADOBUTROL 10 ML: 604.72 INJECTION INTRAVENOUS at 11:06

## 2019-02-07 RX ADMIN — Medication 10 ML: at 14:04

## 2019-02-07 RX ADMIN — ACETAMINOPHEN 650 MG: 325 TABLET ORAL at 12:26

## 2019-02-07 RX ADMIN — SERTRALINE 50 MG: 50 TABLET, FILM COATED ORAL at 09:38

## 2019-02-07 RX ADMIN — CETIRIZINE HYDROCHLORIDE 10 MG: 10 TABLET, FILM COATED ORAL at 09:37

## 2019-02-07 RX ADMIN — CARVEDILOL 25 MG: 12.5 TABLET, FILM COATED ORAL at 09:37

## 2019-02-07 RX ADMIN — Medication 10 ML: at 06:43

## 2019-02-07 RX ADMIN — HYDROCHLOROTHIAZIDE 25 MG: 25 TABLET ORAL at 09:37

## 2019-02-07 RX ADMIN — CARVEDILOL 25 MG: 12.5 TABLET, FILM COATED ORAL at 16:35

## 2019-02-07 RX ADMIN — SPIRONOLACTONE 25 MG: 25 TABLET, FILM COATED ORAL at 14:03

## 2019-02-07 NOTE — DISCHARGE INSTRUCTIONS
Discharge Instructions       PATIENT ID: Gary Perea  MRN: [id number]   YOB: 1980    DATE OF ADMISSION: 2/5/2019  1:54 PM    DATE OF DISCHARGE: 2/7/2019    PRIMARY CARE PROVIDER: Hola Gonzales NP     ATTENDING PHYSICIAN: Dg Pulido MD  DISCHARGING PROVIDER: Caty Hansen MD    To contact this individual call [phone] and ask the  to page. If unavailable ask to be transferred the Adult Hospitalist Department. DISCHARGE DIAGNOSES :Hypertensive urgency    CONSULTATIONS: IP CONSULT TO NEPHROLOGY    PROCEDURES/SURGERIES: * No surgery found *    PENDING TEST RESULTS:   At the time of discharge the following test results are still pending: aldosterone/renin levels      Ct Head Wo Cont    Result Date: 2/5/2019  IMPRESSION: No acute intracranial process. Mra Abd W Cont    Result Date: 2/7/2019  IMPRESSION:  No evidence of renal artery stenosis. FOLLOW UP APPOINTMENTS:   Follow-up Information     Follow up With Specialties Details Why Contact Info    Hola Gonzales NP Nurse Practitioner   [address] [place] [address]  Nia Shah MD Nephrology In 1 week  [address] [place] [address] 1690  [address]  [phone]      PCP  In 3 days             ADDITIONAL CARE RECOMMENDATIONS:   1)Please check blood pressure every day if you have low BP or if greater than 170/100 ,call your PCP or  for medication dose adjustments  2)Follow up with PCP in 3-5 days  3)Please get a basal metabolic panel in 3 days  4)Follow up with  in 1-2 weeks  5)If you have any chest pain,SOB,worse headache or worsening symptoms return to ER    DIET: regular     ACTIVITY: Activity as tolerated    WOUND CARE: na    EQUIPMENT needed: na      DISCHARGE MEDICATIONS:   See Medication Reconciliation Form    · It is important that you take the medication exactly as they are prescribed.    · Keep your medication in the bottles provided by the pharmacist and keep a list of the medication names, dosages, and times to be taken in your wallet. · Do not take other medications without consulting your doctor. NOTIFY YOUR PHYSICIAN FOR ANY OF THE FOLLOWING:   Fever over 101 degrees for 24 hours. Chest pain, shortness of breath, fever, chills, nausea, vomiting, diarrhea, change in mentation, falling, weakness, bleeding. Severe pain or pain not relieved by medications. Or, any other signs or symptoms that you may have questions about.       DISPOSITION:  X  Home With:   OT  PT  HH  RN       SNF/Inpatient Rehab/LTAC    Independent/assisted living    Hospice    Other:         Signed:   Caty Hansen MD  2/7/2019  3:58 PM

## 2019-02-07 NOTE — PROGRESS NOTES
Problem: Falls - Risk of  Goal: *Absence of Falls  Document Suman Fall Risk and appropriate interventions in the flowsheet. Fall Risk Interventions:            Medication Interventions: Assess postural VS orthostatic hypotension, Evaluate medications/consider consulting pharmacy, Patient to call before getting OOB, Teach patient to arise slowly                  Problem: Pressure Injury - Risk of  Goal: *Prevention of pressure injury  Document Jeison Scale and appropriate interventions in the flowsheet.   Outcome: Progressing Towards Goal  Pressure Injury Interventions:

## 2019-02-07 NOTE — PROGRESS NOTES
Bedside shift change report given to [address] [place] [address] [place], RN (oncoming nurse) by Alpesh Toure RN (offgoing nurse). Report included the following information SBAR, Kardex, ED Summary, Intake/Output, MAR, Accordion, Recent Results, Med Rec Status, Cardiac Rhythm NSR, Alarm Parameters , Pre Procedure Checklist, Procedure Verification and Quality Measures.

## 2019-02-07 NOTE — PROGRESS NOTES
Bedside and Verbal shift change report given to Mayte Alvarez RN (oncoming nurse) by Andres Barreto RN (offgoing nurse). Report included the following information SBAR, Kardex, Procedure Summary, Intake/Output, MAR, Recent Results and Cardiac Rhythm NSR.

## 2019-02-07 NOTE — PROGRESS NOTES
Name: Amber Thompson   MRN: [id number]  : 1980        Assessment:  1. Resistant longstanding HTN presenting with hypertensive urgency associated with Hypokalemia  Thyroid and renal fxn are nl. UA without proteinuria    Aggravating factors includes obesity, ?high salt (has cut out intake)  Had recent sleep study as outpt- needs to be followed up    2. Hypokalemia     DDx includes KYLE (due to FMD) high on list vs Primary hyperaldo; Coreg increased by me last night  Started on Aldactone. BP improved overnight, was stable but now spiking again  Her spot urine K is 40, which is high in face of hypokalemia suggestive of renal K wasting    Plan/Recommendations:  Check MRA of renal arteries to look for KYLE-ordered  Ct current BP meds. Can titrate Aldactone and next agent to consider would be hydralazine  Use clonidine prn for break through HTN (avoiding long term use, as it made her drowsy as outpt)  F/u on sleep study results-done as outpt  F/u on Echo results  D/C once BP is under better control  AM labs, if pt still here    Subjective:  Stating to get HA again. SBP in 170's when I saw her earlier  Was down to 140's overnight    ROS:   No nausea, no vomiting  No chest pain, no shortness of breath    Exam:  Visit Vitals  BP (!) 179/123 (BP 1 Location: Right arm, BP Patient Position: At rest)   Pulse 84   Temp 97.9 °F (36.6 °C)   Resp 20   Ht 5' 1\" (1.549 m)   Wt 87.5 kg (193 lb)   SpO2 95%   BMI 36.47 kg/m²       Gen: NAD  HEENT: No icterus, mmm,  AT/NC  Neck: No JVD  Lungs/Chest wall: Clear. Equal BS. No respiratory distress  Cardiovascular: Regular rate, normal rhythm. Abdomen/: Soft, NT,  BS+, no abd bruit  Ext: No peripheral edema  CNS: alert and awake.      Current Facility-Administered Medications   Medication Dose Route Frequency Last Dose    [COMPLETED] sodium chloride 0.9 % bolus infusion 100 mL  100 mL IntraVENous RAD ONCE 60 mL at 02/07/19 1200    acetaminophen (TYLENOL) tablet 650 mg  650 mg Oral Q4H  mg at 02/06/19 1712    carvedilol (COREG) tablet 25 mg  25 mg Oral BID WITH MEALS 25 mg at 02/07/19 2071    spironolactone (ALDACTONE) tablet 25 mg  25 mg Oral DAILY      enoxaparin (LOVENOX) injection 40 mg  40 mg SubCUTAneous Q24H 40 mg at 02/06/19 1852    nicotine (NICODERM CQ) 14 mg/24 hr patch 1 Patch  1 Patch TransDERmal DAILY 1 Patch at 02/06/19 0900    albuterol-ipratropium (DUO-NEB) 2.5 MG-0.5 MG/3 ML  3 mL Nebulization Q6H RT Stopped at 02/07/19 0200    albuterol (PROVENTIL VENTOLIN) nebulizer solution 2.5 mg  2.5 mg Nebulization Q4H PRN      hydrALAZINE (APRESOLINE) 20 mg/mL injection 10 mg  10 mg IntraVENous Q6H PRN 10 mg at 02/05/19 2311    sodium chloride (NS) flush 5-40 mL  5-40 mL IntraVENous Q8H 10 mL at 02/07/19 0643    sodium chloride (NS) flush 5-40 mL  5-40 mL IntraVENous PRN      cetirizine (ZYRTEC) tablet 10 mg  10 mg Oral DAILY 10 mg at 02/07/19 0937    hydroCHLOROthiazide (HYDRODIURIL) tablet 25 mg  25 mg Oral DAILY 25 mg at 02/07/19 0937    lisinopril (PRINIVIL, ZESTRIL) tablet 40 mg  40 mg Oral DAILY 40 mg at 02/07/19 0938    montelukast (SINGULAIR) tablet 10 mg  10 mg Oral DAILY 10 mg at 02/07/19 6197    sertraline (ZOLOFT) tablet 50 mg  50 mg Oral DAILY 50 mg at 02/07/19 0938    pantoprazole (PROTONIX) tablet 40 mg  40 mg Oral ACB 40 mg at 02/07/19 1666       Labs/Data:    Lab Results   Component Value Date/Time    WBC 8.9 02/06/2019 02:19 AM    HGB 10.7 (L) 02/06/2019 02:19 AM    HCT 34.8 (L) 02/06/2019 02:19 AM    PLATELET 030 09/12/0753 02:19 AM    MCV 89.2 02/06/2019 02:19 AM       Lab Results   Component Value Date/Time    Sodium 140 02/07/2019 03:31 AM    Potassium 3.4 (L) 02/07/2019 03:31 AM    Chloride 104 02/07/2019 03:31 AM    CO2 27 02/07/2019 03:31 AM    Anion gap 9 02/07/2019 03:31 AM    Glucose 121 (H) 02/07/2019 03:31 AM    BUN 11 02/07/2019 03:31 AM    Creatinine 0.80 02/07/2019 03:31 AM    BUN/Creatinine ratio 14 02/07/2019 03:31 AM    GFR est AA >60 02/07/2019 03:31 AM    GFR est non-AA >60 02/07/2019 03:31 AM    Calcium 8.7 02/07/2019 03:31 AM       Wt Readings from Last 3 Encounters:   02/07/19 87.5 kg (193 lb)   02/06/19 86.2 kg (190 lb 0.6 oz)   01/31/19 88.5 kg (195 lb 3.2 oz)         Intake/Output Summary (Last 24 hours) at 2/7/2019 1155  Last data filed at 2/7/2019 0400  Gross per 24 hour   Intake --   Output 300 ml   Net -300 ml       Patient seen and examined. Chart reviewed. Labs, data and other pertinent notes reviewed in last 24 hrs.     PMH/SH/FH reviewed and unchanged compared to H&P    Discussed with pt/ and Dr. Austyn Saenz MD

## 2019-02-07 NOTE — PROGRESS NOTES
I have reviewed discharge instructions with the patient. The patient verbalized understanding. Patient has no questions/concerns about discharge instructions or new medications.

## 2019-02-07 NOTE — PROGRESS NOTES
A Spiritual Care Partner Volunteer visited patient in Rm [id number] on 2/07/2019.   Documented by:  Chaplain Schreiber MDiv, MS, [address] ([id number])

## 2019-02-07 NOTE — PROGRESS NOTES
Problem: Falls - Risk of  Goal: *Absence of Falls  Document Suman Fall Risk and appropriate interventions in the flowsheet. Outcome: Progressing Towards Goal  Fall Risk Interventions:            Medication Interventions: Patient to call before getting OOB, Teach patient to arise slowly                  Problem: Pressure Injury - Risk of  Goal: *Prevention of pressure injury  Document Jeison Scale and appropriate interventions in the flowsheet.   Outcome: Progressing Towards Goal  Pressure Injury Interventions:

## 2019-02-07 NOTE — TELEPHONE ENCOUNTER
Wali Manuel is to be contacted by lead sleep technologist regarding results of Sleep Testing which was indicative of an average AHI of 3.9 per hour with an SpO2 nahum of 84% and SpO2 of < 88% being 0.7 minutes. Attended night and day testing is recommended to assess the complaint of excessive daytime sleepiness reported by the patient at the time of initial visit and as implied by an [place] Sleepiness Score of 11. Encounter Diagnosis   Name Primary?     Narcolepsy without cataplexy Yes       Orders Placed This Encounter    MULTIPLE SLEEP LATENCY TEST     Standing Status:   Future     Standing Expiration Date:   8/7/2019    7-DRUG SCREEN, UR    POLYSOMNOGRAPHY 1 NIGHT     Order Specific Question:   Reason for Exam     Answer:   Narcolepsy

## 2019-02-08 ENCOUNTER — HOSPITAL ENCOUNTER (EMERGENCY)
Age: 39
Discharge: HOME | End: 2019-02-08
Attending: EMERGENCY MEDICINE
Payer: SELF-PAY

## 2019-02-08 VITALS
TEMPERATURE: 98.6 F | RESPIRATION RATE: 21 BRPM | SYSTOLIC BLOOD PRESSURE: 132 MMHG | HEART RATE: 101 BPM | DIASTOLIC BLOOD PRESSURE: 72 MMHG | OXYGEN SATURATION: 98 %

## 2019-02-08 DIAGNOSIS — I16.0 HYPERTENSIVE URGENCY: Primary | ICD-10-CM

## 2019-02-08 LAB
ALBUMIN SERPL-MCNC: 3.6 G/DL (ref 3.5–5)
ALBUMIN/GLOB SERPL: 0.9 {RATIO} (ref 1.1–2.2)
ALP SERPL-CCNC: 63 U/L (ref 45–117)
ALT SERPL-CCNC: 21 U/L (ref 12–78)
ANION GAP SERPL CALC-SCNC: 8 MMOL/L (ref 5–15)
AST SERPL-CCNC: 17 U/L (ref 15–37)
BASOPHILS # BLD: 0 K/UL (ref 0–0.1)
BASOPHILS NFR BLD: 0 % (ref 0–1)
BILIRUB SERPL-MCNC: 0.3 MG/DL (ref 0.2–1)
BNP SERPL-MCNC: 33 PG/ML (ref 0–125)
BUN SERPL-MCNC: 10 MG/DL (ref 6–20)
BUN/CREAT SERPL: 14 (ref 12–20)
CALCIUM SERPL-MCNC: 9 MG/DL (ref 8.5–10.1)
CHLORIDE SERPL-SCNC: 103 MMOL/L (ref 97–108)
CO2 SERPL-SCNC: 26 MMOL/L (ref 21–32)
COMMENT, HOLDF: NORMAL
CREAT SERPL-MCNC: 0.74 MG/DL (ref 0.55–1.02)
DIFFERENTIAL METHOD BLD: NORMAL
EOSINOPHIL # BLD: 0.3 K/UL (ref 0–0.4)
EOSINOPHIL NFR BLD: 3 % (ref 0–7)
ERYTHROCYTE [DISTWIDTH] IN BLOOD BY AUTOMATED COUNT: 13.3 % (ref 11.5–14.5)
GLOBULIN SER CALC-MCNC: 3.9 G/DL (ref 2–4)
GLUCOSE SERPL-MCNC: 101 MG/DL (ref 65–100)
HCT VFR BLD AUTO: 38.2 % (ref 35–47)
HGB BLD-MCNC: 11.7 G/DL (ref 11.5–16)
IMM GRANULOCYTES # BLD AUTO: 0 K/UL (ref 0–0.04)
IMM GRANULOCYTES NFR BLD AUTO: 0 % (ref 0–0.5)
LYMPHOCYTES # BLD: 2.2 K/UL (ref 0.8–3.5)
LYMPHOCYTES NFR BLD: 28 % (ref 12–49)
MCH RBC QN AUTO: 27.3 PG (ref 26–34)
MCHC RBC AUTO-ENTMCNC: 30.6 G/DL (ref 30–36.5)
MCV RBC AUTO: 89 FL (ref 80–99)
MONOCYTES # BLD: 0.5 K/UL (ref 0–1)
MONOCYTES NFR BLD: 6 % (ref 5–13)
NEUTS SEG # BLD: 5.1 K/UL (ref 1.8–8)
NEUTS SEG NFR BLD: 63 % (ref 32–75)
NRBC # BLD: 0 K/UL (ref 0–0.01)
NRBC BLD-RTO: 0 PER 100 WBC
PLATELET # BLD AUTO: 275 K/UL (ref 150–400)
PMV BLD AUTO: 10.9 FL (ref 8.9–12.9)
POTASSIUM SERPL-SCNC: 3.6 MMOL/L (ref 3.5–5.1)
PROT SERPL-MCNC: 7.5 G/DL (ref 6.4–8.2)
RBC # BLD AUTO: 4.29 M/UL (ref 3.8–5.2)
SAMPLES BEING HELD,HOLD: NORMAL
SODIUM SERPL-SCNC: 137 MMOL/L (ref 136–145)
TROPONIN I BLD-MCNC: <0.04 NG/ML (ref 0–0.08)
WBC # BLD AUTO: 8.1 K/UL (ref 3.6–11)

## 2019-02-08 PROCEDURE — 83880 ASSAY OF NATRIURETIC PEPTIDE: CPT

## 2019-02-08 PROCEDURE — 36415 COLL VENOUS BLD VENIPUNCTURE: CPT

## 2019-02-08 PROCEDURE — 74011250636 HC RX REV CODE- 250/636: Performed by: EMERGENCY MEDICINE

## 2019-02-08 PROCEDURE — 96374 THER/PROPH/DIAG INJ IV PUSH: CPT

## 2019-02-08 PROCEDURE — 80053 COMPREHEN METABOLIC PANEL: CPT

## 2019-02-08 PROCEDURE — 84484 ASSAY OF TROPONIN QUANT: CPT

## 2019-02-08 PROCEDURE — 99285 EMERGENCY DEPT VISIT HI MDM: CPT

## 2019-02-08 PROCEDURE — 93005 ELECTROCARDIOGRAM TRACING: CPT

## 2019-02-08 PROCEDURE — 74011250637 HC RX REV CODE- 250/637: Performed by: EMERGENCY MEDICINE

## 2019-02-08 PROCEDURE — 85025 COMPLETE CBC W/AUTO DIFF WBC: CPT

## 2019-02-08 RX ORDER — NITROGLYCERIN 0.4 MG/1
0.4 TABLET SUBLINGUAL
Status: DISCONTINUED | OUTPATIENT
Start: 2019-02-08 | End: 2019-02-09 | Stop reason: HOSPADM

## 2019-02-08 RX ORDER — HYDRALAZINE HYDROCHLORIDE 20 MG/ML
20 INJECTION INTRAMUSCULAR; INTRAVENOUS
Status: COMPLETED | OUTPATIENT
Start: 2019-02-08 | End: 2019-02-08

## 2019-02-08 RX ORDER — HYDRALAZINE HYDROCHLORIDE 20 MG/ML
20 INJECTION INTRAMUSCULAR; INTRAVENOUS
Status: DISCONTINUED | OUTPATIENT
Start: 2019-02-08 | End: 2019-02-09 | Stop reason: HOSPADM

## 2019-02-08 RX ORDER — ACETAMINOPHEN 500 MG
1000 TABLET ORAL
Status: COMPLETED | OUTPATIENT
Start: 2019-02-08 | End: 2019-02-08

## 2019-02-08 RX ORDER — GUAIFENESIN 100 MG/5ML
324 LIQUID (ML) ORAL
Status: COMPLETED | OUTPATIENT
Start: 2019-02-08 | End: 2019-02-08

## 2019-02-08 RX ORDER — HYDROCHLOROTHIAZIDE 25 MG/1
50 TABLET ORAL DAILY
Qty: 28 TAB | Refills: 0 | Status: SHIPPED | OUTPATIENT
Start: 2019-02-08 | End: 2019-02-22

## 2019-02-08 RX ADMIN — ASPIRIN 81 MG CHEWABLE TABLET 324 MG: 81 TABLET CHEWABLE at 21:24

## 2019-02-08 RX ADMIN — HYDRALAZINE HYDROCHLORIDE 20 MG: 20 INJECTION INTRAMUSCULAR; INTRAVENOUS at 22:34

## 2019-02-08 RX ADMIN — ACETAMINOPHEN 1000 MG: 500 TABLET ORAL at 23:27

## 2019-02-08 RX ADMIN — NITROGLYCERIN 0.4 MG: 0.4 TABLET, ORALLY DISINTEGRATING SUBLINGUAL at 21:24

## 2019-02-08 RX ADMIN — NITROGLYCERIN 0.4 MG: 0.4 TABLET, ORALLY DISINTEGRATING SUBLINGUAL at 21:45

## 2019-02-08 NOTE — DISCHARGE SUMMARY
Discharge Summary       PATIENT ID: Orly Quigley  MRN: [id number]   YOB: 1980    DATE OF ADMISSION: 2/5/2019  1:54 PM    DATE OF DISCHARGE: 2/6/2019  PRIMARY CARE PROVIDER: Bernard Domingo NP     ATTENDING PHYSICIAN: Lynn Green MD    DISCHARGING PROVIDER: Lynn Green MD    To contact this individual call [phone] and ask the  to page. If unavailable ask to be transferred the Adult Hospitalist Department. CONSULTATIONS: IP CONSULT TO NEPHROLOGY    PROCEDURES/SURGERIES: * No surgery found *    ADMITTING DIAGNOSES & HOSPITAL COURSE:   45 y.o F with PMH of uncontrolled HTN was admitted due to HTN urgency and headache.     Uncontrolled HTN:Hypertensive urgency (POA): improved  --As she has hypokalemia - hyperaldosteronism or renal artery stenosis must be ruled out. -Nephrology following  - MRA abdomen was normal  -Echo shows grade 2 diastolic dysfunction likely due to uncontrolled HTN  -patient also recently had WILLIAMS eval as OP. Sleep study results pending.  -aldosterone/renin level levels pending  -Urine potassium high  -Patient's BP was 150/80s evening of 2/7/2019 and patient said she is feeling better  -Patient discharged on hydrochlorothiazide 25 mg,spironolactone 50 mg,lisinopril 40 mg,coreg 25 mg BID. Prescriptions given. -Patient was instructed to follow up with PCP and will need basal metabolic panel in 3 days. Discussed with patient's PCP's office.        #Headaches:improved  -due to uncontrolled HTN  -CT head at admission did not show any acute abnormalities.     #Hypokalemia:repleted  -potassium supplements were stopped as patient is discharged on spironolactone  -repeat BMP in 3 days     #Morbid obesity:  -Body mass index is 35.9 kg/m². -encouraged weight loss - diet and exercise.           PENDING TEST RESULTS:   At the time of discharge the following test results are still pending:none    Ct Head Wo Cont    Result Date: 2/5/2019  IMPRESSION: No acute intracranial process. Mra Abd W Cont    Result Date: 2/7/2019  IMPRESSION:  No evidence of renal artery stenosis. FOLLOW UP APPOINTMENTS:    Follow-up Information     Follow up With Specialties Details Why Contact Info    Jorge Augustin NP Nurse Practitioner   [address] [facility]  Shalini Marshall MD Nephrology In 1 week  [address] [facility] [address]  [address]  [phone]      PCP  In 3 days           ADDITIONAL CARE RECOMMENDATIONS:   1)Please check blood pressure every day if you have low BP or if greater than 170/100 ,call your PCP or  for medication dose adjustments  2)Follow up with PCP in 3-5 days  3)Please get a basal metabolic panel in 3 days  4)Follow up with  in 1-2 weeks  5)If you have any chest pain,SOB,worse headache or worsening symptoms return to ER    DIET: regular diet    ACTIVITY: Activity as tolerated    WOUND CARE: na    EQUIPMENT needed: na      DISCHARGE MEDICATIONS:  Discharge Medication List as of 2/7/2019  4:25 PM      START taking these medications    Details   spironolactone (ALDACTONE) 50 mg tablet Take 1 Tab by mouth daily. , Print, Disp-30 Tab, R-0         CONTINUE these medications which have CHANGED    Details   carvedilol (COREG) 25 mg tablet Take 1 Tab by mouth two (2) times daily (with meals). , Print, Disp-60 Tab, R-0         CONTINUE these medications which have NOT CHANGED    Details   hydroCHLOROthiazide (HYDRODIURIL) 25 mg tablet Take 25 mg by mouth daily. , Historical Med      lisinopril (PRINIVIL, ZESTRIL) 40 mg tablet Take 40 mg by mouth daily. , Historical Med      cetirizine (ZYRTEC) 10 mg tablet Take 10 mg by mouth daily. , Historical Med      sertraline (ZOLOFT) 50 mg tablet Take 50 mg by mouth daily. , Historical Med      montelukast (SINGULAIR) 10 mg tablet Take 10 mg by mouth daily. , Historical Med      albuterol-ipratropium (DUO-NEB) 2.5 mg-0.5 mg/3 ml nebu 3 mL by Nebulization route every six (6) hours as needed., Historical Med      albuterol (PROAIR HFA) 90 mcg/actuation inhaler Take 2 Puffs by inhalation every four (4) hours as needed., Historical Med      omeprazole (PRILOSEC) 20 mg capsule Take 20 mg by mouth Daily (before breakfast). , Historical Med         STOP taking these medications       potassium chloride (K-DUR, KLOR-CON) 20 mEq tablet Comments:   Reason for Stopping:         hydrALAZINE (APRESOLINE) 10 mg tablet Comments:   Reason for Stopping:                 NOTIFY YOUR PHYSICIAN FOR ANY OF THE FOLLOWING:   Fever over 101 degrees for 24 hours. Chest pain, shortness of breath, fever, chills, nausea, vomiting, diarrhea, change in mentation, falling, weakness, bleeding. Severe pain or pain not relieved by medications. Or, any other signs or symptoms that you may have questions about. DISPOSITION:  X  Home With:   OT  PT  HH  RN       Long term SNF/Inpatient Rehab    Independent/assisted living    Hospice    Other:       PATIENT CONDITION AT DISCHARGE:     Functional status    Poor     Deconditioned    X Independent      Cognition   X  Lucid     Forgetful     Dementia      Catheters/lines (plus indication)    Howard     PICC     PEG    X None      Code status    X Full code     DNR      PHYSICAL EXAMINATION AT DISCHARGE:     Constitutional:  No acute distress, cooperative, pleasant    ENT:  Oral mucous moist, oropharynx benign. Neck supple,    Resp:  CTA bilaterally. No wheezing/rhonchi/rales. No accessory muscle use   CV:  Regular rhythm, normal rate, no murmurs, gallops, rubs    GI:  Soft, non distended,obese abdomen, non tender. normoactive bowel sounds, no hepatosplenomegaly     Musculoskeletal:  No edema, warm, 2+ pulses throughout    Neurologic:  Moves all extremities.   AAOx3, CN II-XII reviewed             CHRONIC MEDICAL DIAGNOSES:  Problem List as of 2/7/2019 Date Reviewed: 2/5/2019          Codes Class Noted - Resolved    Hypertensive urgency ICD-10-CM: I16.0  ICD-9-CM: 401.9  2/5/2019 - Present              45 minutes were spent with the patient on counseling and coordination of care    Signed:   Sabra Bui MD  2/8/2019  6:23 AM

## 2019-02-09 LAB
ATRIAL RATE: 72 BPM
CALCULATED P AXIS, ECG09: 55 DEGREES
CALCULATED R AXIS, ECG10: 101 DEGREES
CALCULATED T AXIS, ECG11: 48 DEGREES
DIAGNOSIS, 93000: NORMAL
P-R INTERVAL, ECG05: 202 MS
Q-T INTERVAL, ECG07: 402 MS
QRS DURATION, ECG06: 90 MS
QTC CALCULATION (BEZET), ECG08: 440 MS
VENTRICULAR RATE, ECG03: 72 BPM

## 2019-02-09 NOTE — ED TRIAGE NOTES
Triage:  Pt arrives from home with CC of chest pain and hypertension. Pt was discharged from [facility] yesterday after a 3 day admission for the same complaint. Pt describes her chest pain as a tightness to her left chest.  Pt denies SOB. Pt reports she also has a photophobic headache. BP in triage is 216/146.

## 2019-02-09 NOTE — DISCHARGE INSTRUCTIONS
Patient Education        Acute High Blood Pressure: Care Instructions  Your Care Instructions    Acute high blood pressure is very high blood pressure. It's a serious problem. Very high blood pressure can damage your brain, heart, eyes, and kidneys. You may have been given medicines to lower your blood pressure. You may have gotten them as pills or through a needle in one of your veins. This is called an IV. And maybe you were given other medicines too. These can be needed when high blood pressure causes other problems. To keep your blood pressure at a lower level, you may need to make healthy lifestyle changes. And you will probably need to take medicines. Be sure to follow up with your doctor about your blood pressure and what you can do about it. Follow-up care is a key part of your treatment and safety. Be sure to make and go to all appointments, and call your doctor if you are having problems. It's also a good idea to know your test results and keep a list of the medicines you take. How can you care for yourself at home? · See your doctor as often as he or she recommends. This is to make sure your blood pressure is under control. You will probably go at least 2 times a year. But it may be more often at first.  · Take your blood pressure medicine exactly as prescribed. You may take one or more types. They include diuretics, beta-blockers, ACE inhibitors, calcium channel blockers, and angiotensin II receptor blockers. Call your doctor if you think you are having a problem with your medicine. · If you take blood pressure medicine, talk to your doctor before you take decongestants or anti-inflammatory medicine, such as ibuprofen. These can raise blood pressure. · Learn how to check your blood pressure at home. Check it often. · Ask your doctor if it's okay to drink alcohol. · Talk to your doctor about lifestyle changes that can help blood pressure. These include being active and not smoking.   When should you call for help? Call 911 anytime you think you may need emergency care. This may mean having symptoms that suggest that your blood pressure is causing a serious heart or blood vessel problem. Your blood pressure may be over 180/120.   For example, call 911 if:    · You have symptoms of a heart attack. These may include:  ? Chest pain or pressure, or a strange feeling in the chest.  ? Sweating. ? Shortness of breath. ? Nausea or vomiting. ? Pain, pressure, or a strange feeling in the back, neck, jaw, or upper belly or in one or both shoulders or arms. ? Lightheadedness or sudden weakness. ? A fast or irregular heartbeat.     · You have symptoms of a stroke. These may include:  ? Sudden numbness, tingling, weakness, or loss of movement in your face, arm, or leg, especially on only one side of your body. ? Sudden vision changes. ? Sudden trouble speaking. ? Sudden confusion or trouble understanding simple statements. ? Sudden problems with walking or balance. ? A sudden, severe headache that is different from past headaches.     · You have severe back or belly pain.    Do not wait until your blood pressure comes down on its own. Get help right away.   Call your doctor now or seek immediate care if:    · Your blood pressure is much higher than normal (such as 180/120 or higher), but you don't have symptoms.     · You think high blood pressure is causing symptoms, such as:  ? Severe headache.  ? Blurry vision.    Watch closely for changes in your health, and be sure to contact your doctor if:    · Your blood pressure measures higher than your doctor recommends at least 2 times. That means the top number is higher or the bottom number is higher, or both.     · You think you may be having side effects from your blood pressure medicine. Where can you learn more? Go to http://rosemary-marisela.info/.   Enter X825 in the search box to learn more about \"Acute High Blood Pressure: Care Instructions. \"  Current as of: July 22, 2018  Content Version: 11.9  © 9513-4765 [employer], [facility]. Care instructions adapted under license by [employer] (which disclaims liability or warranty for this information). If you have questions about a medical condition or this instruction, always ask your healthcare professional. [place] [address] any warranty or liability for your use of this information.

## 2019-02-09 NOTE — ED PROVIDER NOTES
45 y.o. female with past medical history significant for HTN, asthma, and GERD who presents with chief complaint of elevated BP. Pt complains of an elevated BP at 182/123 with associated headache, chest tightness, SOB, and dizziness. Pt rates the headache at 10/10. Pt is noted to have been discharged from a 3 day admission here at [facility] for HTN. Pt states she took all medications as prescribed after discharge last night and today. Pt states her symptoms feel similar to when her BP is elevated. Pt is noted to have a history of uncontrolled HTN. Pt was given 1 nitro with little relief. Pt denies nausea, or vomiting. There are no other acute medical concerns at this time. Social hx: Current Smoker. Social EtOH Use. PCP: Early Im, NP    Note written by Madiha Barfield, as dictated by Dong Diez MD 9:44 PM        The history is provided by the patient and the spouse. Hypertension    Associated symptoms include headaches, dizziness and shortness of breath. Pertinent negatives include no vomiting. Chest Pain (Angina)    Associated symptoms include dizziness, headaches and shortness of breath. Pertinent negatives include no abdominal pain, no fever and no vomiting.         Past Medical History:   Diagnosis Date    Asthma     GERD (gastroesophageal reflux disease)     Hypertension        Past Surgical History:   Procedure Laterality Date    HX GYN      ovarian cyst         Family History:   Problem Relation Age of Onset    Diabetes Mother     Asthma Father        Social History     Socioeconomic History    Marital status:      Spouse name: Not on file    Number of children: Not on file    Years of education: Not on file    Highest education level: Not on file   Social Needs    Financial resource strain: Not on file    Food insecurity - worry: Not on file    Food insecurity - inability: Not on file    Transportation needs - medical: Not on file   [employer] needs - non-medical: Not on file   Occupational History    Not on file   Tobacco Use    Smoking status: Current Every Day Smoker     Packs/day: 0.50    Smokeless tobacco: Never Used   Substance and Sexual Activity    Alcohol use: Yes     Comment: socially    Drug use: No    Sexual activity: Not on file   Other Topics Concern     Service Not Asked    Blood Transfusions Not Asked    Caffeine Concern Not Asked    Occupational Exposure Not Asked    Hobby Hazards Not Asked    Sleep Concern Not Asked    Stress Concern Not Asked    Weight Concern Not Asked    Special Diet Not Asked    Back Care Not Asked    Exercise Not Asked    Bike Helmet Not Asked   [address] [facility] Not Asked    Self-Exams Not Asked   Social History Narrative    Not on file         ALLERGIES: Amlodipine; Percocet [oxycodone-acetaminophen]; Sulfa (sulfonamide antibiotics); and Vicodin [hydrocodone-acetaminophen]    Review of Systems   Constitutional: Negative for chills and fever. Respiratory: Positive for chest tightness and shortness of breath. Gastrointestinal: Negative for abdominal pain, constipation, diarrhea and vomiting. Neurological: Positive for dizziness and headaches. Negative for light-headedness. All other systems reviewed and are negative. Vitals:    02/08/19 1954 02/08/19 2119   BP:  (!) 194/121   Pulse: 99 73   Resp:  16   Temp:  98.7 °F (37.1 °C)   SpO2: 100% 98%            Physical Exam   Constitutional: She is oriented to person, place, and time. She appears well-developed and well-nourished. HENT:   Head: Normocephalic and atraumatic. Eyes: Pupils are equal, round, and reactive to light. Neck: Normal range of motion. Neck supple. Cardiovascular: Normal rate and regular rhythm. Pulmonary/Chest: Effort normal and breath sounds normal.   Abdominal: Soft. She exhibits no distension. There is no tenderness. Neurological: She is alert and oriented to person, place, and time.    Skin: Skin is warm and dry. Capillary refill takes less than 2 seconds. Psychiatric: She has a normal mood and affect. Her behavior is normal.   Nursing note and vitals reviewed. Note written by Joselito Arora. Finn End, as dictated by Leonel Serrano MD 9:46 PM      MDM  Number of Diagnoses or Management Options  Hypertensive urgency:   Diagnosis management comments: Pt w hx of HTN presenting with Hypertensive urgency. No CVA, STEMI, NSTEMI, CHF, or ANGELA to indicate hypertensive emergency and patient's BP improved in the ED with hydralazine. Increased dose of HCTZ for additional BP control and patient will follow-up with PCP for continued management. Procedures    PROGRESS NOTE:  10:52 PM  Pt /100 after the hydralazine. Will give another dose. 11:25 PM  Patient did not require a second dose of hydralazine. Resting comfortably with an improved BP.

## 2019-02-11 NOTE — TELEPHONE ENCOUNTER
Reviewed sleep study results with patient. She expressed understanding and is willing to proceed with a PSG followed by a MSLT.

## 2019-02-14 LAB
ALDOST SERPL-MCNC: 9.8 NG/DL (ref 0–30)
RENIN PLAS-CCNC: <0.167 NG/ML/HR (ref 0.17–5.38)
SPECIMEN SOURCE: ABNORMAL

## 2019-02-26 ENCOUNTER — TELEPHONE (OUTPATIENT)
Dept: SLEEP MEDICINE | Age: 39
End: 2019-02-26

## 2019-02-26 ENCOUNTER — HOSPITAL ENCOUNTER (OUTPATIENT)
Dept: SLEEP MEDICINE | Age: 39
Discharge: HOME | End: 2019-02-26
Payer: SELF-PAY

## 2019-02-26 VITALS
SYSTOLIC BLOOD PRESSURE: 126 MMHG | BODY MASS INDEX: 37.06 KG/M2 | OXYGEN SATURATION: 97 % | DIASTOLIC BLOOD PRESSURE: 84 MMHG | TEMPERATURE: 98.6 F | HEART RATE: 81 BPM | WEIGHT: 196.3 LBS | HEIGHT: 61 IN

## 2019-02-26 PROCEDURE — 95810 POLYSOM 6/> YRS 4/> PARAM: CPT | Performed by: INTERNAL MEDICINE

## 2019-02-27 ENCOUNTER — HOSPITAL ENCOUNTER (OUTPATIENT)
Dept: SLEEP MEDICINE | Age: 39
Discharge: HOME | End: 2019-02-27
Payer: SELF-PAY

## 2019-02-27 DIAGNOSIS — G47.419 NARCOLEPSY WITHOUT CATAPLEXY: ICD-10-CM

## 2019-02-27 PROCEDURE — 95805 MULTIPLE SLEEP LATENCY TEST: CPT | Performed by: INTERNAL MEDICINE

## 2019-02-28 LAB
AMPHETAMINES UR QL SCN: NEGATIVE NG/ML
BARBITURATES UR QL SCN: NEGATIVE NG/ML
BENZODIAZ UR QL: NEGATIVE NG/ML
BZE UR QL: NEGATIVE NG/ML
CANNABINOIDS UR QL SCN: NEGATIVE NG/ML
DRUG SCREEN COMMENT:, 753798: NORMAL
OPIATES UR QL: NEGATIVE NG/ML
PCP UR QL: NEGATIVE NG/ML

## 2019-03-19 ENCOUNTER — OFFICE VISIT (OUTPATIENT)
Dept: SLEEP MEDICINE | Age: 39
End: 2019-03-19

## 2019-03-21 ENCOUNTER — OFFICE VISIT (OUTPATIENT)
Dept: SLEEP MEDICINE | Age: 39
End: 2019-03-21

## 2019-03-21 VITALS
BODY MASS INDEX: 37.38 KG/M2 | HEIGHT: 61 IN | HEART RATE: 93 BPM | DIASTOLIC BLOOD PRESSURE: 82 MMHG | SYSTOLIC BLOOD PRESSURE: 119 MMHG | WEIGHT: 198 LBS | OXYGEN SATURATION: 98 %

## 2019-03-21 DIAGNOSIS — G47.33 OSA (OBSTRUCTIVE SLEEP APNEA): Primary | ICD-10-CM

## 2019-03-21 NOTE — PROGRESS NOTES
*Johny Ryan was provided information on Polysomnogram and Multiple Sleep Latency Testing results are documented below:    PSG(02-): Apnea/Hypopnea index of 1.1 which indicates no signifiant sleep apnea. Severe snoring was present and patient did not achieve supine position. MSLT(02-): Moderate daytime sleepiness as evidenced by an average latency of 8:00 minutes, Stage REM was not noted. Visit Vitals  /82   Pulse 93   Ht 5' 1\" (1.549 m)   Wt 198 lb (89.8 kg)   SpO2 98%   BMI 37.41 kg/m²       Encounter Diagnosis   Name Primary?  WILLIAMS (obstructive sleep apnea) Yes       *Johny Ryan and parent were provided information on the differential diagnosis of hypersomnia. * Stimulant medication offered but declined by patient. She indicated of a history of severe HTN and that this was the reason for the sleep medicine referral.  She did agree to repeat a HSAT while sleeping in supine position. Orders Placed This Encounter    SLEEP STUDY UNATTENDED, 4 CHANNEL     Order Specific Question:   Reason for Exam     Answer:   WILLIAMS     * She was provided information on sleep apnea including coresponding risk factors and the importance of proper treatment. * Treatment options if indicated were reviewed today. Patient agrees to a trial of PAP therapy if indicated. * Effect of sleep disturbance on weight was reviewed. We have recommended a dedicated weight loss through appropriate diet and an exercise regiment as significant weight reduction has been shown to reduce severity of obstructive sleep apnea.      * Patient agrees to telephone [phone]  follow-up by myself or lead sleep technologist shortly after sleep study to review results and plan final management.     (patient has given permission for a message to be left regarding test results and further management if patient cannot be cannot be reached directly).       Office visit exceeded 15 minutes with counseling and direction of care taking up more than 50% of the allotted time. Thank you for allowing to participate in your patient's medical care. Jennifer Mayo MD, FAASM  Diplomate American Board of Sleep Medicine  Diplomate in Sleep Medicine - ABP    Electronically signed.  03/21/19

## 2019-03-21 NOTE — PATIENT INSTRUCTIONS
217 [facility] [place]. [place], [address]  Tel.  [phone]  Fax. 100 [facility] 60  1001 [facility] [address], [address]  Tel.  [phone]  Fax. [phone] [address]  Tel.  [phone]  Fax. [phone]     Sleep Apnea: After Your Visit  Your Care Instructions  Sleep apnea occurs when you frequently stop breathing for 10 seconds or longer during sleep. It can be mild to severe, based on the number of times per hour that you stop breathing or have slowed breathing. Blocked or narrowed airways in your nose, mouth, or throat can cause sleep apnea. Your airway can become blocked when your throat muscles and tongue relax during sleep. Sleep apnea is common, occurring in 1 out of 20 individuals. Individuals having any of the following characteristics should be evaluated and treated right away due to high risk and detrimental consequences from untreated sleep apnea:  1. Obesity  2. Congestive Heart failure  3. Atrial Fibrillation  4. Uncontrolled Hypertension  5. Type II Diabetes  6. Night-time Arrhythmias  7. Stroke  8. Pulmonary Hypertension  9. High-risk Driving Populations (pilots, truck drivers, etc.)  10. Patients Considering Weight-loss Surgery    How do you know you have sleep apnea? You probably have sleep apnea if you answer 'yes' to 3 or more of the following questions:  S - Have you been told that you Snore? T - Are you often Tired during the day? O - Has anyone Observed you stop breathing while sleeping? P- Do you have (or are being treated for) high blood Pressure? B - Are you obese (Body Mass Index > 35)? A - Is your Age 48years old or older? N - Is your Neck size greater than 16 inches? G - Are you male Gender? A sleep physician can prescribe a breathing device that prevents tissues in the throat from blocking your airway.  Or your doctor may recommend using a dental device (oral breathing device) to help keep your airway open. In some cases, surgery may be needed to remove enlarged tissues in the throat. Follow-up care is a key part of your treatment and safety. Be sure to make and go to all appointments, and call your doctor if you are having problems. It's also a good idea to know your test results and keep a list of the medicines you take. How can you care for yourself at home? · Lose weight, if needed. It may reduce the number of times you stop breathing or have slowed breathing. · Go to bed at the same time every night. · Sleep on your side. It may stop mild apnea. If you tend to roll onto your back, sew a pocket in the back of your pajama top. Put a tennis ball into the pocket, and stitch the pocket shut. This will help keep you from sleeping on your back. · Avoid alcohol and medicines such as sleeping pills and sedatives before bed. · Do not smoke. Smoking can make sleep apnea worse. If you need help quitting, talk to your doctor about stop-smoking programs and medicines. These can increase your chances of quitting for good. · Prop up the head of your bed 4 to 6 inches by putting bricks under the legs of the bed. · Treat breathing problems, such as a stuffy nose, caused by a cold or allergies. · Use a continuous positive airway pressure (CPAP) breathing machine if lifestyle changes do not help your apnea and your doctor recommends it. The machine keeps your airway from closing when you sleep. · If CPAP does not help you, ask your doctor whether you should try other breathing machines. A bilevel positive airway pressure machine has two types of air pressureâone for breathing in and one for breathing out. Another device raises or lowers air pressure as needed while you breathe. · If your nose feels dry or bleeds when using one of these machines, talk with your doctor about increasing moisture in the air. A humidifier may help.   · If your nose is runny or stuffy from using a breathing machine, talk with your doctor about using decongestants or a corticosteroid nasal spray. When should you call for help? Watch closely for changes in your health, and be sure to contact your doctor if:  · You still have sleep apnea even though you have made lifestyle changes. · You are thinking of trying a device such as CPAP. · You are having problems using a CPAP or similar machine. Where can you learn more? Go to "[employer] Enter D914 in the search box to learn more about \"Sleep Apnea: After Your Visit. \"   © 5854-4148 Healthwise, Incorporated. Care instructions adapted under license by [facility] [employer] (which disclaims liability or warranty for this information). This care instruction is for use with your licensed healthcare professional. If you have questions about a medical condition or this instruction, always ask your healthcare professional. Andrew Vazquez any warranty or liability for your use of this information. PROPER SLEEP HYGIENE    What to avoid  · Do not have drinks with caffeine, such as coffee or black tea, for 8 hours before bed. · Do not smoke or use other types of tobacco near bedtime. Nicotine is a stimulant and can keep you awake. · Avoid drinking alcohol late in the evening, because it can cause you to wake in the middle of the night. · Do not eat a big meal close to bedtime. If you are hungry, eat a light snack. · Do not drink a lot of water close to bedtime, because the need to urinate may wake you up during the night. · Do not read or watch TV in bed. Use the bed only for sleeping and sexual activity. What to try  · Go to bed at the same time every night, and wake up at the same time every morning. Do not take naps during the day. · Keep your bedroom quiet, dark, and cool. · Get regular exercise, but not within 3 to 4 hours of your bedtime. .  · Sleep on a comfortable pillow and mattress.   · If watching the clock makes you anxious, turn it facing away from you so you cannot see the time. · If you worry when you lie down, start a worry book. Well before bedtime, write down your worries, and then set the book and your concerns aside. · Try meditation or other relaxation techniques before you go to bed. · If you cannot fall asleep, get up and go to another room until you feel sleepy. Do something relaxing. Repeat your bedtime routine before you go to bed again. · Make your house quiet and calm about an hour before bedtime. Turn down the lights, turn off the TV, log off the computer, and turn down the volume on music. This can help you relax after a busy day. Drowsy Driving  The [address] [place] [address] Road Traffic Safety Administration cites drowsiness as a causing factor in more than 114,673 police reported crashes annually, resulting in 76,000 injuries and 1,500 deaths. Other surveys suggest 55% of people polled have driven while drowsy in the past year, 23% had fallen asleep but not crashed, 3% crashed, and 2% had and accident due to drowsy driving. Who is at risk? Young Drivers: One study of drowsy driving accidents states that 55% of the drivers were under 25 years. Of those, 75% were male. Shift Workers and Travelers: People who work overnight or travel across time zones frequently are at higher risk of experiencing Circadian Rhythm Disorders. They are trying to work and function when their body is programed to sleep. Sleep Deprived: Lack of sleep has a serious impact on your ability to pay attention or focus on a task. Consistently getting less than the average of 8 hours your body needs creates partial or cumulative sleep deprivation. Untreated Sleep Disorders: Sleep Apnea, Narcolepsy, R.L.S., and other sleep disorders (untreated) prevent a person from getting enough restful sleep. This leads to excessive daytime sleepiness and increases the risk for drowsy driving accidents by up to 7 times.   Medications / Alcohol: Even over the counter medications can cause drowsiness. Medications that impair a drivers attention should have a warning label. Alcohol naturally makes you sleepy and on its own can cause accidents. Combined with excessive drowsiness its effects are amplified. Signs of Drowsy Driving:   * You don't remember driving the last few miles   * You may drift out of your william   * You are unable to focus and your thoughts wander   * You may yawn more often than normal   * You have difficulty keeping your eyes open / nodding off   * Missing traffic signs, speeding, or tailgating  Prevention-   Good sleep hygiene, lifestyle and behavioral choices have the most impact on drowsy driving. There is no substitute for sleep and the average person requires 8 hours nightly. If you find yourself driving drowsy, stop and sleep. Consider the sleep hygiene tips provided during your visit as well. Medication Refill Policy: Refills for all medications require 1 week advance notice. Please have your pharmacy fax a refill request. We are unable to fax, or call in \"controled substance\" medications and you will need to pick these prescriptions up from our office. [employer] Activation    Thank you for requesting access to [employer]. Please follow the instructions below to securely access and download your online medical record. [employer] allows you to send messages to your doctor, view your test results, renew your prescriptions, schedule appointments, and more. How Do I Sign Up? 1. In your internet browser, go to [email or web address]. 2. Click on the First Time User? Click Here link in the Sign In box. You will see the New Member Sign Up page. 3. Enter your [employer] Access Code exactly as it appears below. You will not need to use this code after youve completed the sign-up process. If you do not sign up before the expiration date, you must request a new code.     [employer] Access Code: [email or web address]  Expires: 4/5/2019 12:23 PM (This is the date your "[employer] access code will )    4. Enter the last four digits of your Social Security Number (xxxx) and Date of Birth (mm/dd/yyyy) as indicated and click Submit. You will be taken to the next sign-up page. 5. Create a Barosenset ID. This will be your "[employer] login ID and cannot be changed, so think of one that is secure and easy to remember. 6. Create a "[employer] password. You can change your password at any time. 7. Enter your Password Reset Question and Answer. This can be used at a later time if you forget your password. 8. Enter your e-mail address. You will receive e-mail notification when new information is available in [address] 9. Click Sign Up. You can now view and download portions of your medical record. 10. Click the Download Summary menu link to download a portable copy of your medical information. Additional Information    If you have questions, please call [phone]. Remember, "[employer] is NOT to be used for urgent needs. For medical emergencies, dial 911.

## 2019-03-24 ENCOUNTER — HOSPITAL ENCOUNTER (OUTPATIENT)
Dept: SLEEP MEDICINE | Age: 39
Discharge: HOME | End: 2019-03-24
Payer: SELF-PAY

## 2019-03-24 PROCEDURE — 95806 SLEEP STUDY UNATT&RESP EFFT: CPT | Performed by: INTERNAL MEDICINE

## 2019-04-02 ENCOUNTER — TELEPHONE (OUTPATIENT)
Dept: SLEEP MEDICINE | Age: 39
End: 2019-04-02

## 2019-04-02 DIAGNOSIS — G47.33 OSA (OBSTRUCTIVE SLEEP APNEA): Primary | ICD-10-CM

## 2019-04-02 NOTE — TELEPHONE ENCOUNTER
Madan Hood is to be contacted by lead sleep technologist regarding results of Sleep Testing which was indicative of an average AHI of 10.6 per hour with an SpO2 nahum of 71% and SpO2 of < 88% being 2.1 minutes. An APAP prescription has been written and patient will be contacted by office staff regarding follow-up  in 2-3 months after initiation of therapy. Encounter Diagnosis   Name Primary?  WILLIAMS (obstructive sleep apnea) Yes       Orders Placed This Encounter    AMB SUPPLY ORDER     Diagnosis: Obstructive Sleep Apnea ICD-10 Code (G47.33)    Positive Airway Pressure Therapy: Duration of need: 99 months. ResMed APAP Device with Heated Humidifer [id number] / [id number]. Minimum Pressure: 4 cmH2O, Maximum Pressure: 20 cmH2O.  Nasal Cushion (Replace) 2 per month.  Nasal Interface Mask 1 every 3 months.  Headgear 1 every 6 months.  Filter(s) Disposable 2 per month.  Filter(s) Non-Disposable 1 every 6 months. [address] for Locked Ramirez (Replace) 1 every 6 months.  Tubing with heating element 1 every 3 months. Perform Mask Fitting per patient preference and comfort - replace as above. César Rivera MD, FAASM; NPI: [id number]  Electronically signed. 04/02/19

## 2019-04-02 NOTE — TELEPHONE ENCOUNTER
HSAT Returned-[facility]    Date of Study: 3/24/19    The following information was gathered from the patients study log:      Further information provided: Log scanned into media

## 2019-04-03 ENCOUNTER — DOCUMENTATION ONLY (OUTPATIENT)
Dept: SLEEP MEDICINE | Age: 39
End: 2019-04-03

## 2019-05-01 ENCOUNTER — TELEPHONE (OUTPATIENT)
Dept: SLEEP MEDICINE | Age: 39
End: 2019-05-01

## 2019-05-17 ENCOUNTER — DOCUMENTATION ONLY (OUTPATIENT)
Dept: SLEEP MEDICINE | Age: 39
End: 2019-05-17

## 2019-07-02 ENCOUNTER — HOSPITAL ENCOUNTER (EMERGENCY)
Age: 39
Discharge: HOME | End: 2019-07-03
Attending: EMERGENCY MEDICINE
Payer: SELF-PAY

## 2019-07-02 DIAGNOSIS — R73.9 HYPERGLYCEMIA: ICD-10-CM

## 2019-07-02 DIAGNOSIS — E86.0 DEHYDRATION: Primary | ICD-10-CM

## 2019-07-02 LAB
ALBUMIN SERPL-MCNC: 4 G/DL (ref 3.5–5)
ALBUMIN/GLOB SERPL: 0.9 {RATIO} (ref 1.1–2.2)
ALP SERPL-CCNC: 136 U/L (ref 45–117)
ALT SERPL-CCNC: 21 U/L (ref 12–78)
ANION GAP SERPL CALC-SCNC: 8 MMOL/L (ref 5–15)
APPEARANCE UR: CLEAR
ARTERIAL PATENCY WRIST A: ABNORMAL
AST SERPL-CCNC: 20 U/L (ref 15–37)
BACTERIA URNS QL MICRO: NEGATIVE /HPF
BASE EXCESS BLDV CALC-SCNC: 4 MMOL/L
BASOPHILS # BLD: 0 K/UL (ref 0–0.1)
BASOPHILS NFR BLD: 0 % (ref 0–1)
BDY SITE: ABNORMAL
BILIRUB SERPL-MCNC: 0.3 MG/DL (ref 0.2–1)
BILIRUB UR QL: NEGATIVE
BUN SERPL-MCNC: 10 MG/DL (ref 6–20)
BUN/CREAT SERPL: 8 (ref 12–20)
CALCIUM SERPL-MCNC: 9.3 MG/DL (ref 8.5–10.1)
CHLORIDE SERPL-SCNC: 92 MMOL/L (ref 97–108)
CO2 SERPL-SCNC: 27 MMOL/L (ref 21–32)
COLOR UR: ABNORMAL
CREAT SERPL-MCNC: 1.19 MG/DL (ref 0.55–1.02)
DIFFERENTIAL METHOD BLD: ABNORMAL
EOSINOPHIL # BLD: 0.2 K/UL (ref 0–0.4)
EOSINOPHIL NFR BLD: 3 % (ref 0–7)
EPITH CASTS URNS QL MICRO: ABNORMAL /LPF
ERYTHROCYTE [DISTWIDTH] IN BLOOD BY AUTOMATED COUNT: 12.3 % (ref 11.5–14.5)
GAS FLOW.O2 O2 DELIVERY SYS: ABNORMAL L/MIN
GLOBULIN SER CALC-MCNC: 4.7 G/DL (ref 2–4)
GLUCOSE BLD STRIP.AUTO-MCNC: 428 MG/DL (ref 65–100)
GLUCOSE BLD STRIP.AUTO-MCNC: 587 MG/DL (ref 65–100)
GLUCOSE BLD STRIP.AUTO-MCNC: 597 MG/DL (ref 65–100)
GLUCOSE SERPL-MCNC: 623 MG/DL (ref 65–100)
GLUCOSE UR STRIP.AUTO-MCNC: >1000 MG/DL
HCG UR QL: NEGATIVE
HCO3 BLDV-SCNC: 28.1 MMOL/L (ref 23–28)
HCT VFR BLD AUTO: 37.9 % (ref 35–47)
HGB BLD-MCNC: 12.1 G/DL (ref 11.5–16)
HGB UR QL STRIP: NEGATIVE
HYALINE CASTS URNS QL MICRO: ABNORMAL /LPF (ref 0–5)
IMM GRANULOCYTES # BLD AUTO: 0 K/UL (ref 0–0.04)
IMM GRANULOCYTES NFR BLD AUTO: 0 % (ref 0–0.5)
KETONES UR QL STRIP.AUTO: NEGATIVE MG/DL
LACTATE SERPL-SCNC: 2.3 MMOL/L (ref 0.4–2)
LEUKOCYTE ESTERASE UR QL STRIP.AUTO: NEGATIVE
LYMPHOCYTES # BLD: 1.5 K/UL (ref 0.8–3.5)
LYMPHOCYTES NFR BLD: 21 % (ref 12–49)
MCH RBC QN AUTO: 25.9 PG (ref 26–34)
MCHC RBC AUTO-ENTMCNC: 31.9 G/DL (ref 30–36.5)
MCV RBC AUTO: 81.2 FL (ref 80–99)
MONOCYTES # BLD: 0.4 K/UL (ref 0–1)
MONOCYTES NFR BLD: 5 % (ref 5–13)
NEUTS SEG # BLD: 5.2 K/UL (ref 1.8–8)
NEUTS SEG NFR BLD: 71 % (ref 32–75)
NITRITE UR QL STRIP.AUTO: NEGATIVE
NRBC # BLD: 0 K/UL (ref 0–0.01)
NRBC BLD-RTO: 0 PER 100 WBC
PCO2 BLDV: 42.3 MMHG (ref 41–51)
PH BLDV: 7.43 [PH] (ref 7.32–7.42)
PH UR STRIP: 5.5 [PH] (ref 5–8)
PLATELET # BLD AUTO: 324 K/UL (ref 150–400)
PMV BLD AUTO: 10.7 FL (ref 8.9–12.9)
PO2 BLDV: 69 MMHG (ref 25–40)
POTASSIUM SERPL-SCNC: 4 MMOL/L (ref 3.5–5.1)
PROT SERPL-MCNC: 8.7 G/DL (ref 6.4–8.2)
PROT UR STRIP-MCNC: NEGATIVE MG/DL
RBC # BLD AUTO: 4.67 M/UL (ref 3.8–5.2)
RBC #/AREA URNS HPF: ABNORMAL /HPF (ref 0–5)
SAO2 % BLDV: 94 % (ref 65–88)
SERVICE CMNT-IMP: ABNORMAL
SODIUM SERPL-SCNC: 127 MMOL/L (ref 136–145)
SP GR UR REFRACTOMETRY: 1 (ref 1–1.03)
SPECIMEN TYPE: ABNORMAL
TOTAL RESP. RATE, ITRR: 18
UA: UC IF INDICATED,UAUC: ABNORMAL
UROBILINOGEN UR QL STRIP.AUTO: 0.2 EU/DL (ref 0.2–1)
WBC # BLD AUTO: 7.4 K/UL (ref 3.6–11)
WBC URNS QL MICRO: ABNORMAL /HPF (ref 0–4)

## 2019-07-02 PROCEDURE — 36415 COLL VENOUS BLD VENIPUNCTURE: CPT

## 2019-07-02 PROCEDURE — 82550 ASSAY OF CK (CPK): CPT

## 2019-07-02 PROCEDURE — 80053 COMPREHEN METABOLIC PANEL: CPT

## 2019-07-02 PROCEDURE — 83605 ASSAY OF LACTIC ACID: CPT

## 2019-07-02 PROCEDURE — 99285 EMERGENCY DEPT VISIT HI MDM: CPT

## 2019-07-02 PROCEDURE — 96374 THER/PROPH/DIAG INJ IV PUSH: CPT

## 2019-07-02 PROCEDURE — 82803 BLOOD GASES ANY COMBINATION: CPT

## 2019-07-02 PROCEDURE — 82962 GLUCOSE BLOOD TEST: CPT

## 2019-07-02 PROCEDURE — 96361 HYDRATE IV INFUSION ADD-ON: CPT

## 2019-07-02 PROCEDURE — 81001 URINALYSIS AUTO W/SCOPE: CPT

## 2019-07-02 PROCEDURE — 81025 URINE PREGNANCY TEST: CPT

## 2019-07-02 PROCEDURE — 85025 COMPLETE CBC W/AUTO DIFF WBC: CPT

## 2019-07-02 PROCEDURE — 84484 ASSAY OF TROPONIN QUANT: CPT

## 2019-07-02 PROCEDURE — 74011636637 HC RX REV CODE- 636/637: Performed by: EMERGENCY MEDICINE

## 2019-07-02 PROCEDURE — 74011250636 HC RX REV CODE- 250/636: Performed by: EMERGENCY MEDICINE

## 2019-07-02 RX ORDER — SODIUM CHLORIDE 0.9 % (FLUSH) 0.9 %
5-40 SYRINGE (ML) INJECTION AS NEEDED
Status: DISCONTINUED | OUTPATIENT
Start: 2019-07-02 | End: 2019-07-03 | Stop reason: HOSPADM

## 2019-07-02 RX ORDER — SODIUM CHLORIDE 0.9 % (FLUSH) 0.9 %
5-40 SYRINGE (ML) INJECTION EVERY 8 HOURS
Status: DISCONTINUED | OUTPATIENT
Start: 2019-07-02 | End: 2019-07-03 | Stop reason: HOSPADM

## 2019-07-02 RX ADMIN — SODIUM CHLORIDE 1000 ML: 900 INJECTION, SOLUTION INTRAVENOUS at 22:43

## 2019-07-02 RX ADMIN — Medication 10 ML: at 22:43

## 2019-07-02 RX ADMIN — HUMAN INSULIN 10 UNITS: 100 INJECTION, SOLUTION SUBCUTANEOUS at 22:48

## 2019-07-02 RX ADMIN — SODIUM CHLORIDE 1000 ML: 900 INJECTION, SOLUTION INTRAVENOUS at 23:43

## 2019-07-02 NOTE — LETTER
[address] 
[facility] EMERGENCY DEPT 
[address] [address] 
[phone] Work/School Note Date: 7/2/2019 To Whom It May concern: Veena Rayo was seen and treated today in the emergency room by the following provider(s): 
Attending Provider: Talon Hernandez MD. Wesley Love was with his wife all night in the ED. Please excuse him from work today.  
 
 
 
Sincerely, 
 
 
 
 
Sara Donovan RN

## 2019-07-03 VITALS
DIASTOLIC BLOOD PRESSURE: 67 MMHG | TEMPERATURE: 99.6 F | RESPIRATION RATE: 16 BRPM | SYSTOLIC BLOOD PRESSURE: 121 MMHG | OXYGEN SATURATION: 95 % | HEART RATE: 70 BPM | HEIGHT: 61 IN | BODY MASS INDEX: 37.29 KG/M2 | WEIGHT: 197.53 LBS

## 2019-07-03 LAB
ATRIAL RATE: 72 BPM
CALCULATED P AXIS, ECG09: 45 DEGREES
CALCULATED R AXIS, ECG10: 106 DEGREES
CALCULATED T AXIS, ECG11: 43 DEGREES
CK MB CFR SERPL CALC: NORMAL % (ref 0–2.5)
CK MB SERPL-MCNC: <1 NG/ML (ref 5–25)
CK SERPL-CCNC: 98 U/L (ref 26–192)
DIAGNOSIS, 93000: NORMAL
GLUCOSE BLD STRIP.AUTO-MCNC: 251 MG/DL (ref 65–100)
GLUCOSE BLD STRIP.AUTO-MCNC: 313 MG/DL (ref 65–100)
GLUCOSE BLD STRIP.AUTO-MCNC: 330 MG/DL (ref 65–100)
LACTATE SERPL-SCNC: 1.1 MMOL/L (ref 0.4–2)
P-R INTERVAL, ECG05: 202 MS
Q-T INTERVAL, ECG07: 400 MS
QRS DURATION, ECG06: 88 MS
QTC CALCULATION (BEZET), ECG08: 438 MS
SERVICE CMNT-IMP: ABNORMAL
TROPONIN I SERPL-MCNC: <0.05 NG/ML
VENTRICULAR RATE, ECG03: 72 BPM

## 2019-07-03 PROCEDURE — 82962 GLUCOSE BLOOD TEST: CPT

## 2019-07-03 PROCEDURE — 74011636637 HC RX REV CODE- 636/637: Performed by: EMERGENCY MEDICINE

## 2019-07-03 PROCEDURE — 96376 TX/PRO/DX INJ SAME DRUG ADON: CPT

## 2019-07-03 PROCEDURE — 36415 COLL VENOUS BLD VENIPUNCTURE: CPT

## 2019-07-03 PROCEDURE — 93005 ELECTROCARDIOGRAM TRACING: CPT

## 2019-07-03 PROCEDURE — 83605 ASSAY OF LACTIC ACID: CPT

## 2019-07-03 RX ORDER — METFORMIN HYDROCHLORIDE 850 MG/1
850 TABLET ORAL 2 TIMES DAILY WITH MEALS
Qty: 60 TAB | Refills: 0 | Status: SHIPPED | OUTPATIENT
Start: 2019-07-03

## 2019-07-03 RX ADMIN — HUMAN INSULIN 6 UNITS: 100 INJECTION, SOLUTION SUBCUTANEOUS at 02:54

## 2019-07-03 NOTE — DISCHARGE INSTRUCTIONS
Patient Education        Dehydration: Care Instructions  Your Care Instructions  Dehydration happens when your body loses too much fluid. This might happen when you do not drink enough water or you lose large amounts of fluids from your body because of diarrhea, vomiting, or sweating. Severe dehydration can be life-threatening. Water and minerals called electrolytes help put your body fluids back in balance. Learn the early signs of fluid loss, and drink more fluids to prevent dehydration. Follow-up care is a key part of your treatment and safety. Be sure to make and go to all appointments, and call your doctor if you are having problems. It's also a good idea to know your test results and keep a list of the medicines you take. How can you care for yourself at home? · To prevent dehydration, drink plenty of fluids, enough so that your urine is light yellow or clear like water. Choose water and other caffeine-free clear liquids until you feel better. If you have kidney, heart, or liver disease and have to limit fluids, talk with your doctor before you increase the amount of fluids you drink. · If you do not feel like eating or drinking, try taking small sips of water, sports drinks, or other rehydration drinks. · Get plenty of rest.  To prevent dehydration  · Add more fluids to your diet and daily routine, unless your doctor has told you not to. · During hot weather, drink more fluids. Drink even more fluids if you exercise a lot. Stay away from drinks with alcohol or caffeine. · Watch for the symptoms of dehydration. These include:  ? A dry, sticky mouth. ? Dark yellow urine, and not much of it. ? Dry and sunken eyes. ? Feeling very tired. · Learn what problems can lead to dehydration. These include:  ? Diarrhea, fever, and vomiting. ? Any illness with a fever, such as pneumonia or the flu. ?  Activities that cause heavy sweating, such as endurance races and heavy outdoor work in hot or humid weather. ? Alcohol or drug abuse or withdrawal.  ? Certain medicines, such as cold and allergy pills (antihistamines), diet pills (diuretics), and laxatives. ? Certain diseases, such as diabetes, cancer, and heart or kidney disease. When should you call for help? Call 911 anytime you think you may need emergency care. For example, call if:    · You passed out (lost consciousness).    Call your doctor now or seek immediate medical care if:    · You are confused and cannot think clearly.     · You are dizzy or lightheaded, or you feel like you may faint.     · You have signs of needing more fluids. You have sunken eyes and a dry mouth, and you pass only a little dark urine.     · You cannot keep fluids down.    Watch closely for changes in your health, and be sure to contact your doctor if:    · You are not making tears.     · Your skin is very dry and sags slowly back into place after you pinch it.     · Your mouth and eyes are very dry. Where can you learn more? Go to http://rosemary-marisela.info/. Enter X517 in the search box to learn more about \"Dehydration: Care Instructions. \"  Current as of: September 23, 2018  Content Version: 11.9  © 3881-5708 [employer]. Care instructions adapted under license by [employer] (which disclaims liability or warranty for this information). If you have questions about a medical condition or this instruction, always ask your healthcare professional. [place] [address] any warranty or liability for your use of this information. Patient Education        Learning About High Blood Sugar  What is high blood sugar? Your body turns the food you eat into glucose (sugar), which it uses for energy. But if your body isn't able to use the sugar right away, it can build up in your blood and lead to high blood sugar. When the amount of sugar in your blood stays too high for too much of the time, you may have diabetes. Diabetes is a disease that can cause serious health problems. The good news is that lifestyle changes may help you get your blood sugar back to normal and avoid or delay diabetes. What causes high blood sugar? Sugar (glucose) can build up in your blood if you:  · Are overweight. · Have a family history of diabetes. · Take certain medicines, such as steroids. What are the symptoms? Having high blood sugar may not cause any symptoms at all. Or it may make you feel very thirsty or very hungry. You may also urinate more often than usual, have blurry vision, or lose weight without trying. How is high blood sugar treated? You can take steps to lower your blood sugar level if you understand what makes it get higher. Your doctor may want you to learn how to test your blood sugar level at home. Then you can see how illness, stress, or different kinds of food or medicine raise or lower your blood sugar level. Other tests may be needed to see if you have diabetes. How can you prevent high blood sugar? · Watch your weight. If you're overweight, losing just a small amount of weight may help. Reducing fat around your waist is most important. · Limit the amount of calories, sweets, and unhealthy fat you eat. Ask your doctor if a dietitian can help you. A registered dietitian can help you create meal plans that fit your lifestyle. · Get at least 30 minutes of exercise on most days of the week. Exercise helps control your blood sugar. It also helps you maintain a healthy weight. Walking is a good choice. You also may want to do other activities, such as running, swimming, cycling, or playing tennis or team sports. · If your doctor prescribed medicines, take them exactly as prescribed. Call your doctor if you think you are having a problem with your medicine. You will get more details on the specific medicines your doctor prescribes. Follow-up care is a key part of your treatment and safety.  Be sure to make and go to all appointments, and call your doctor if you are having problems. It's also a good idea to know your test results and keep a list of the medicines you take. Where can you learn more? Go to http://rosemary-marisela.info/. Enter O108 in the search box to learn more about \"Learning About High Blood Sugar. \"  Current as of: July 25, 2018  Content Version: 11.9  © 4852-2150 [employer]. Care instructions adapted under license by [employer] [facility] [employer] [facility] (which disclaims liability or warranty for this information). If you have questions about a medical condition or this instruction, always ask your healthcare professional. [address] any warranty or liability for your use of this information.

## 2019-07-03 NOTE — ED NOTES
Bedside shift change report given to Won Edmond RN  (oncoming nurse) by Hilario Walter RN  (offgoing nurse). Report included the following information SBAR, ED Summary, MAR and Recent Results.

## 2019-07-03 NOTE — ED PROVIDER NOTES
EMERGENCY DEPARTMENT HISTORY AND PHYSICAL EXAM      Date: 7/2/2019  Patient Name: Pavel Ying    History of Presenting Illness     Chief Complaint   Patient presents with    Urinary Frequency     Pt reports peeing more than normal. no hx of diabetes    Other     She states she has been thirsty constantly and can't get enough water       History Provided By: Patient    HPI: Pavel Ying, 45 y.o. female with past medical history significant for hypertension presents to the emergency room with generalized fatigue and increased thirst and urination. Patient reports that she was not feeling herself for the past 3 weeks and has been generally tired and fatigued. The symptoms have gotten worse in the past few days. She also reports increased frequency of urination and increased thirst and dry mouth, feeling like she can never drink enough water at a time. Tonight she started having some tingling in her fingers and she was concerned that her potassium might be dropping low as she has had this problem before. She does report some chest tightness but denies shortness of breath, nausea, vomiting, dysuria, black or bloody stools, constipation, diarrhea. She reports that her PCP told her she might be prediabetic and told her to control her diet. There are no other complaints, changes, or physical findings at this time. PCP: Lary Dumont NP    No current facility-administered medications on file prior to encounter. Current Outpatient Medications on File Prior to Encounter   Medication Sig Dispense Refill    carvedilol (COREG) 25 mg tablet Take 1 Tab by mouth two (2) times daily (with meals). 60 Tab 0    spironolactone (ALDACTONE) 50 mg tablet Take 1 Tab by mouth daily. 30 Tab 0    lisinopril (PRINIVIL, ZESTRIL) 40 mg tablet Take 40 mg by mouth daily.  cetirizine (ZYRTEC) 10 mg tablet Take 10 mg by mouth daily.  sertraline (ZOLOFT) 50 mg tablet Take 50 mg by mouth daily.       montelukast (SINGULAIR) 10 mg tablet Take 10 mg by mouth daily.  albuterol-ipratropium (DUO-NEB) 2.5 mg-0.5 mg/3 ml nebu 3 mL by Nebulization route every six (6) hours as needed.  albuterol (PROAIR HFA) 90 mcg/actuation inhaler Take 2 Puffs by inhalation every four (4) hours as needed.  omeprazole (PRILOSEC) 20 mg capsule Take 20 mg by mouth Daily (before breakfast). Past History     Past Medical History:  Past Medical History:   Diagnosis Date    Asthma     GERD (gastroesophageal reflux disease)     Hypertension        Past Surgical History:  Past Surgical History:   Procedure Laterality Date    HX GYN      ovarian cyst       Family History:  Family History   Problem Relation Age of Onset    Diabetes Mother     Asthma Father        Social History:  Social History     Tobacco Use    Smoking status: Current Every Day Smoker     Packs/day: 0.50    Smokeless tobacco: Never Used   Substance Use Topics    Alcohol use: Yes     Comment: socially    Drug use: No       Allergies: Allergies   Allergen Reactions    Amlodipine Other (comments)     Pt states it makes her BP higher    Percocet [Oxycodone-Acetaminophen] Itching    Sulfa (Sulfonamide Antibiotics) Itching    Vicodin [Hydrocodone-Acetaminophen] Itching         Review of Systems   Review of Systems   Constitutional: Positive for fatigue. Negative for chills and fever. HENT: Negative for congestion, ear pain, rhinorrhea and sinus pressure. Eyes: Negative. Respiratory: Negative for cough, chest tightness, shortness of breath and wheezing. Cardiovascular: Negative for chest pain, palpitations and leg swelling. Gastrointestinal: Negative for abdominal distention, constipation, nausea and vomiting. Endocrine: Positive for polydipsia and polyuria. Genitourinary: Positive for frequency. Negative for dysuria, flank pain, hematuria, pelvic pain and urgency. Musculoskeletal: Negative for back pain, myalgias and neck pain. Skin: Negative for rash and wound. Neurological: Positive for weakness. Negative for syncope, numbness and headaches. Psychiatric/Behavioral: Negative for confusion. The patient is not nervous/anxious.           Physical Exam    General appearance -overweight, well appearing, and in no distress  Eyes - pupils equal and reactive, extraocular eye movements intact  ENT - mucous membranes moist, pharynx normal without lesions  Neck - supple, no significant adenopathy; non-tender to palpation  Chest - clear to auscultation, no wheezes, rales or rhonchi; non-tender to palpation  Heart - normal rate and regular rhythm, S1 and S2 normal, no murmurs noted  Abdomen - soft, nontender, nondistended, no masses or organomegaly  Musculoskeletal - no joint tenderness, deformity or swelling; normal ROM  Extremities - peripheral pulses normal, no pedal edema  Skin - normal coloration and turgor, no rashes  Neurological - alert, oriented x3, normal speech, no focal findings or movement disorder noted    Diagnostic Study Results     Labs -     Recent Results (from the past 12 hour(s))   URINALYSIS W/ REFLEX CULTURE    Collection Time: 07/02/19 10:33 PM   Result Value Ref Range    Color YELLOW/STRAW      Appearance CLEAR CLEAR      Specific gravity 1.005 1.003 - 1.030      pH (UA) 5.5 5.0 - 8.0      Protein NEGATIVE  NEG mg/dL    Glucose >1,000 (A) NEG mg/dL    Ketone NEGATIVE  NEG mg/dL    Bilirubin NEGATIVE  NEG      Blood NEGATIVE  NEG      Urobilinogen 0.2 0.2 - 1.0 EU/dL    Nitrites NEGATIVE  NEG      Leukocyte Esterase NEGATIVE  NEG      UA:UC IF INDICATED CULTURE NOT INDICATED BY UA RESULT CNI      WBC 0-4 0 - 4 /hpf    RBC 0-5 0 - 5 /hpf    Epithelial cells FEW FEW /lpf    Bacteria NEGATIVE  NEG /hpf    Hyaline cast 0-2 0 - 5 /lpf   GLUCOSE, POC    Collection Time: 07/02/19 10:33 PM   Result Value Ref Range    Glucose (POC) 587 (H) 65 - 100 mg/dL    Performed by Hannah Mendoza RN    HCG URINE, QL. - POC    Collection Time: 07/02/19 10:35 PM   Result Value Ref Range    Pregnancy test,urine (POC) NEGATIVE  NEG     METABOLIC PANEL, COMPREHENSIVE    Collection Time: 07/02/19 10:40 PM   Result Value Ref Range    Sodium 127 (L) 136 - 145 mmol/L    Potassium 4.0 3.5 - 5.1 mmol/L    Chloride 92 (L) 97 - 108 mmol/L    CO2 27 21 - 32 mmol/L    Anion gap 8 5 - 15 mmol/L    Glucose 623 (HH) 65 - 100 mg/dL    BUN 10 6 - 20 MG/DL    Creatinine 1.19 (H) 0.55 - 1.02 MG/DL    BUN/Creatinine ratio 8 (L) 12 - 20      GFR est AA >60 >60 ml/min/1.73m2    GFR est non-AA 51 (L) >60 ml/min/1.73m2    Calcium 9.3 8.5 - 10.1 MG/DL    Bilirubin, total 0.3 0.2 - 1.0 MG/DL    ALT (SGPT) 21 12 - 78 U/L    AST (SGOT) 20 15 - 37 U/L    Alk. phosphatase 136 (H) 45 - 117 U/L    Protein, total 8.7 (H) 6.4 - 8.2 g/dL    Albumin 4.0 3.5 - 5.0 g/dL    Globulin 4.7 (H) 2.0 - 4.0 g/dL    A-G Ratio 0.9 (L) 1.1 - 2.2     CBC WITH AUTOMATED DIFF    Collection Time: 07/02/19 10:40 PM   Result Value Ref Range    WBC 7.4 3.6 - 11.0 K/uL    RBC 4.67 3.80 - 5.20 M/uL    HGB 12.1 11.5 - 16.0 g/dL    HCT 37.9 35.0 - 47.0 %    MCV 81.2 80.0 - 99.0 FL    MCH 25.9 (L) 26.0 - 34.0 PG    MCHC 31.9 30.0 - 36.5 g/dL    RDW 12.3 11.5 - 14.5 %    PLATELET 092 539 - 096 K/uL    MPV 10.7 8.9 - 12.9 FL    NRBC 0.0 0  WBC    ABSOLUTE NRBC 0.00 0.00 - 0.01 K/uL    NEUTROPHILS 71 32 - 75 %    LYMPHOCYTES 21 12 - 49 %    MONOCYTES 5 5 - 13 %    EOSINOPHILS 3 0 - 7 %    BASOPHILS 0 0 - 1 %    IMMATURE GRANULOCYTES 0 0.0 - 0.5 %    ABS. NEUTROPHILS 5.2 1.8 - 8.0 K/UL    ABS. LYMPHOCYTES 1.5 0.8 - 3.5 K/UL    ABS. MONOCYTES 0.4 0.0 - 1.0 K/UL    ABS. EOSINOPHILS 0.2 0.0 - 0.4 K/UL    ABS. BASOPHILS 0.0 0.0 - 0.1 K/UL    ABS. IMM.  GRANS. 0.0 0.00 - 0.04 K/UL    DF AUTOMATED     GLUCOSE, POC    Collection Time: 07/02/19 10:53 PM   Result Value Ref Range    Glucose (POC) 597 (H) 65 - 100 mg/dL    Performed by ALVIN ZAVALA    LACTIC ACID    Collection Time: 07/02/19 10:57 PM   Result Value Ref Range Lactic acid 2.3 (HH) 0.4 - 2.0 MMOL/L   POC VENOUS BLOOD GAS    Collection Time: 07/02/19 11:18 PM   Result Value Ref Range    Device: ROOM AIR      pH, venous (POC) 7.431 (H) 7.32 - 7.42      pCO2, venous (POC) 42.3 41 - 51 MMHG    pO2, venous (POC) 69 (H) 25 - 40 mmHg    HCO3, venous (POC) 28.1 (H) 23.0 - 28.0 MMOL/L    sO2, venous (POC) 94 (H) 65 - 88 %    Base excess, venous (POC) 4 mmol/L    Allens test (POC) N/A      Total resp. rate 18      Site OTHER      Specimen type (POC) VENOUS BLOOD     GLUCOSE, POC    Collection Time: 07/02/19 11:40 PM   Result Value Ref Range    Glucose (POC) 428 (H) 65 - 100 mg/dL    Performed by Raquel Conn        Radiologic Studies -   No orders to display     CT Results  (Last 48 hours)    None        CXR Results  (Last 48 hours)    None            Medical Decision Making   I am the first provider for this patient. I reviewed the vital signs, available nursing notes, past medical history, past surgical history, family history and social history. Vital Signs-Reviewed the patient's vital signs. Patient Vitals for the past 12 hrs:   Temp Pulse Resp BP SpO2   07/02/19 2330 -- 88 20 (!) 138/98 96 %   07/02/19 2300 -- 83 21 (!) 166/110 97 %   07/02/19 2220 99.6 °F (37.6 °C) 85 17 (!) 195/131 100 %       EKG: Normal sinus rhythm, 72 beats per minute, rightward axis, first-degree AV block, normal QRS, QTc intervals, nonspecific ST changes  Records Reviewed: Nursing Notes and Old Medical Records    Provider Notes (Medical Decision Making):   Differential diagnosis: Dehydration, electrolyte abnormality, hyperglycemia, UTI    ED Course:   Initial assessment performed. The patients presenting problems have been discussed, and they are in agreement with the care plan formulated and outlined with them. I have encouraged them to ask questions as they arise throughout their visit. Progress Notes:   Patient feeling better and ready for discharge.     Disposition:  DC home    PLAN:  1. Discharge Medication List as of 7/3/2019  5:32 AM        2. Follow-up Information     Follow up With Specialties Details Why Contact Info    [facility] EMERGENCY DEPT Emergency Medicine Go to If symptoms worsen [address], [address] [place] [address],  Nurse Practitioner Call today  [address] [facility] [address] [phone]          Return to ED if worse     Diagnosis     Clinical Impression:   1. Dehydration    2.  Hyperglycemia

## 2019-07-03 NOTE — ED NOTES
Bedside shift change report given to [address], RN  (oncoming nurse) by Gregorio Crane RN  (offgoing nurse). Report included the following information SBAR, ED Summary, MAR and Recent Results.

## 2019-07-16 ENCOUNTER — APPOINTMENT (OUTPATIENT)
Dept: CT IMAGING | Age: 39
End: 2019-07-16
Attending: EMERGENCY MEDICINE
Payer: SELF-PAY

## 2019-07-16 ENCOUNTER — HOSPITAL ENCOUNTER (EMERGENCY)
Age: 39
Discharge: HOME | End: 2019-07-16
Attending: EMERGENCY MEDICINE
Payer: SELF-PAY

## 2019-07-16 ENCOUNTER — APPOINTMENT (OUTPATIENT)
Dept: GENERAL RADIOLOGY | Age: 39
End: 2019-07-16
Attending: EMERGENCY MEDICINE
Payer: SELF-PAY

## 2019-07-16 VITALS
DIASTOLIC BLOOD PRESSURE: 104 MMHG | HEIGHT: 61 IN | RESPIRATION RATE: 18 BRPM | WEIGHT: 200 LBS | TEMPERATURE: 98.6 F | BODY MASS INDEX: 37.76 KG/M2 | SYSTOLIC BLOOD PRESSURE: 155 MMHG | HEART RATE: 68 BPM | OXYGEN SATURATION: 99 %

## 2019-07-16 DIAGNOSIS — R06.00 DYSPNEA, UNSPECIFIED TYPE: Primary | ICD-10-CM

## 2019-07-16 DIAGNOSIS — J45.21 MILD INTERMITTENT ASTHMA WITH ACUTE EXACERBATION: ICD-10-CM

## 2019-07-16 DIAGNOSIS — R73.9 HYPERGLYCEMIA: ICD-10-CM

## 2019-07-16 LAB
ALBUMIN SERPL-MCNC: 3.4 G/DL (ref 3.5–5)
ALBUMIN/GLOB SERPL: 0.9 {RATIO} (ref 1.1–2.2)
ALP SERPL-CCNC: 69 U/L (ref 45–117)
ALT SERPL-CCNC: 27 U/L (ref 12–78)
ANION GAP SERPL CALC-SCNC: 8 MMOL/L (ref 5–15)
AST SERPL-CCNC: 17 U/L (ref 15–37)
BASOPHILS # BLD: 0 K/UL (ref 0–0.1)
BASOPHILS NFR BLD: 0 % (ref 0–1)
BILIRUB SERPL-MCNC: 0.6 MG/DL (ref 0.2–1)
BNP SERPL-MCNC: 60 PG/ML
BUN SERPL-MCNC: 7 MG/DL (ref 6–20)
BUN/CREAT SERPL: 7 (ref 12–20)
CALCIUM SERPL-MCNC: 9.3 MG/DL (ref 8.5–10.1)
CHLORIDE SERPL-SCNC: 101 MMOL/L (ref 97–108)
CK MB CFR SERPL CALC: 1.5 % (ref 0–2.5)
CK MB SERPL-MCNC: 1.1 NG/ML (ref 5–25)
CK SERPL-CCNC: 75 U/L (ref 26–192)
CO2 SERPL-SCNC: 28 MMOL/L (ref 21–32)
COMMENT, HOLDF: NORMAL
CREAT SERPL-MCNC: 1.01 MG/DL (ref 0.55–1.02)
D DIMER PPP FEU-MCNC: 0.87 MG/L FEU (ref 0–0.65)
DIFFERENTIAL METHOD BLD: ABNORMAL
EOSINOPHIL # BLD: 0.1 K/UL (ref 0–0.4)
EOSINOPHIL NFR BLD: 1 % (ref 0–7)
ERYTHROCYTE [DISTWIDTH] IN BLOOD BY AUTOMATED COUNT: 13.9 % (ref 11.5–14.5)
GLOBULIN SER CALC-MCNC: 3.8 G/DL (ref 2–4)
GLUCOSE SERPL-MCNC: 175 MG/DL (ref 65–100)
HCG SERPL QL: NEGATIVE
HCT VFR BLD AUTO: 35.4 % (ref 35–47)
HGB BLD-MCNC: 10.9 G/DL (ref 11.5–16)
IMM GRANULOCYTES # BLD AUTO: 0.1 K/UL (ref 0–0.04)
IMM GRANULOCYTES NFR BLD AUTO: 1 % (ref 0–0.5)
LYMPHOCYTES # BLD: 1.6 K/UL (ref 0.8–3.5)
LYMPHOCYTES NFR BLD: 18 % (ref 12–49)
MCH RBC QN AUTO: 25.3 PG (ref 26–34)
MCHC RBC AUTO-ENTMCNC: 30.8 G/DL (ref 30–36.5)
MCV RBC AUTO: 82.3 FL (ref 80–99)
MONOCYTES # BLD: 0.5 K/UL (ref 0–1)
MONOCYTES NFR BLD: 6 % (ref 5–13)
NEUTS SEG # BLD: 6.7 K/UL (ref 1.8–8)
NEUTS SEG NFR BLD: 74 % (ref 32–75)
NRBC # BLD: 0 K/UL (ref 0–0.01)
NRBC BLD-RTO: 0 PER 100 WBC
PLATELET # BLD AUTO: 294 K/UL (ref 150–400)
PMV BLD AUTO: 10.2 FL (ref 8.9–12.9)
POTASSIUM SERPL-SCNC: 2.9 MMOL/L (ref 3.5–5.1)
PROT SERPL-MCNC: 7.2 G/DL (ref 6.4–8.2)
RBC # BLD AUTO: 4.3 M/UL (ref 3.8–5.2)
SAMPLES BEING HELD,HOLD: NORMAL
SODIUM SERPL-SCNC: 137 MMOL/L (ref 136–145)
TROPONIN I SERPL-MCNC: <0.05 NG/ML
WBC # BLD AUTO: 9 K/UL (ref 3.6–11)

## 2019-07-16 PROCEDURE — 71046 X-RAY EXAM CHEST 2 VIEWS: CPT

## 2019-07-16 PROCEDURE — 85025 COMPLETE CBC W/AUTO DIFF WBC: CPT

## 2019-07-16 PROCEDURE — 74011636320 HC RX REV CODE- 636/320: Performed by: EMERGENCY MEDICINE

## 2019-07-16 PROCEDURE — 74011250637 HC RX REV CODE- 250/637: Performed by: EMERGENCY MEDICINE

## 2019-07-16 PROCEDURE — 83880 ASSAY OF NATRIURETIC PEPTIDE: CPT

## 2019-07-16 PROCEDURE — 85379 FIBRIN DEGRADATION QUANT: CPT

## 2019-07-16 PROCEDURE — 84703 CHORIONIC GONADOTROPIN ASSAY: CPT

## 2019-07-16 PROCEDURE — 80053 COMPREHEN METABOLIC PANEL: CPT

## 2019-07-16 PROCEDURE — 36415 COLL VENOUS BLD VENIPUNCTURE: CPT

## 2019-07-16 PROCEDURE — 99283 EMERGENCY DEPT VISIT LOW MDM: CPT

## 2019-07-16 PROCEDURE — 82550 ASSAY OF CK (CPK): CPT

## 2019-07-16 PROCEDURE — 84484 ASSAY OF TROPONIN QUANT: CPT

## 2019-07-16 PROCEDURE — 71275 CT ANGIOGRAPHY CHEST: CPT

## 2019-07-16 RX ORDER — POTASSIUM CHLORIDE 750 MG/1
40 TABLET, FILM COATED, EXTENDED RELEASE ORAL
Status: COMPLETED | OUTPATIENT
Start: 2019-07-16 | End: 2019-07-16

## 2019-07-16 RX ORDER — SODIUM CHLORIDE 0.9 % (FLUSH) 0.9 %
5-40 SYRINGE (ML) INJECTION AS NEEDED
Status: DISCONTINUED | OUTPATIENT
Start: 2019-07-16 | End: 2019-07-16 | Stop reason: HOSPADM

## 2019-07-16 RX ORDER — PREDNISONE 10 MG/1
TABLET ORAL
Qty: 21 TAB | Refills: 0 | Status: SHIPPED | OUTPATIENT
Start: 2019-07-16 | End: 2019-10-31

## 2019-07-16 RX ORDER — SODIUM CHLORIDE 0.9 % (FLUSH) 0.9 %
5-40 SYRINGE (ML) INJECTION EVERY 8 HOURS
Status: DISCONTINUED | OUTPATIENT
Start: 2019-07-16 | End: 2019-07-16 | Stop reason: HOSPADM

## 2019-07-16 RX ORDER — SODIUM CHLORIDE 0.9 % (FLUSH) 0.9 %
10 SYRINGE (ML) INJECTION
Status: COMPLETED | OUTPATIENT
Start: 2019-07-16 | End: 2019-07-16

## 2019-07-16 RX ADMIN — Medication 10 ML: at 05:07

## 2019-07-16 RX ADMIN — IOPAMIDOL 80 ML: 755 INJECTION, SOLUTION INTRAVENOUS at 05:07

## 2019-07-16 RX ADMIN — POTASSIUM CHLORIDE 40 MEQ: 750 TABLET, FILM COATED, EXTENDED RELEASE ORAL at 04:27

## 2019-07-16 NOTE — LETTER
[facility] EMERGENCY DEPT 
[address] [place] [address] [address] [id number] [phone] Work/School Note Date: 7/16/2019 To Whom It May concern: Edward Christiansen was seen and treated today in the emergency room by the following provider(s): 
Attending Provider: Adele Wick MD. Edward Chirstiansen to be excused from work on July 16, 2019. Tonio Long MD

## 2019-07-16 NOTE — ED TRIAGE NOTES
Patient states her  noticed she was breathing hard when she was sleeping, denies chest pain. Reports hx asthma, htn, new onset diabetes () on metformin. Took 3 nebulizers, as well as 3 inhalers without feeling better. Lung sounds clear bilaterally, respirations even and unlabored.

## 2019-07-16 NOTE — ED NOTES
Patient given discharge instructions by Dr Hayden Carbajal. Patient was able to verbalize understanding of instructions. Questions answered  Patient ambulated out of ED in stable condition.

## 2019-07-16 NOTE — ED PROVIDER NOTES
EMERGENCY DEPARTMENT HISTORY AND PHYSICAL EXAM      Date: 7/16/2019  Patient Name: Ramirez Lenz    History of Presenting Illness     Chief Complaint   Patient presents with    Wheezing       History Provided By: Patient    HPI: Ramirez Lenz, 45 y.o. female with PMHx significant for hypertension, diabetes, and asthma, presents to the emergency room with chief complaint of shortness of breath. Patient reports that she is had difficulty breathing off and on for the past several days. Today she used her nebulizer and and her inhaler each about 3 times. She reports wheezing, chest tightness, and inability to take good deep breaths. She denies fevers, chills, cough, nausea, vomiting, leg swelling, dizziness. PCP: Nicolas Rosen NP    No current facility-administered medications on file prior to encounter. Current Outpatient Medications on File Prior to Encounter   Medication Sig Dispense Refill    metFORMIN (GLUCOPHAGE) 850 mg tablet Take 1 Tab by mouth two (2) times daily (with meals). 60 Tab 0    carvedilol (COREG) 25 mg tablet Take 1 Tab by mouth two (2) times daily (with meals). 60 Tab 0    spironolactone (ALDACTONE) 50 mg tablet Take 1 Tab by mouth daily. 30 Tab 0    lisinopril (PRINIVIL, ZESTRIL) 40 mg tablet Take 40 mg by mouth daily.  cetirizine (ZYRTEC) 10 mg tablet Take 10 mg by mouth daily.  sertraline (ZOLOFT) 50 mg tablet Take 50 mg by mouth daily.  montelukast (SINGULAIR) 10 mg tablet Take 10 mg by mouth daily.  albuterol-ipratropium (DUO-NEB) 2.5 mg-0.5 mg/3 ml nebu 3 mL by Nebulization route every six (6) hours as needed.  albuterol (PROAIR HFA) 90 mcg/actuation inhaler Take 2 Puffs by inhalation every four (4) hours as needed.  omeprazole (PRILOSEC) 20 mg capsule Take 20 mg by mouth Daily (before breakfast).          Past History     Past Medical History:  Past Medical History:   Diagnosis Date    Asthma     GERD (gastroesophageal reflux disease)     Hypertension        Past Surgical History:  Past Surgical History:   Procedure Laterality Date    HX GYN      ovarian cyst       Family History:  Family History   Problem Relation Age of Onset    Diabetes Mother     Asthma Father        Social History:  Social History     Tobacco Use    Smoking status: Current Every Day Smoker     Packs/day: 0.50    Smokeless tobacco: Never Used   Substance Use Topics    Alcohol use: Yes     Comment: socially    Drug use: No       Allergies: Allergies   Allergen Reactions    Amlodipine Other (comments)     Pt states it makes her BP higher    Percocet [Oxycodone-Acetaminophen] Itching    Sulfa (Sulfonamide Antibiotics) Itching    Vicodin [Hydrocodone-Acetaminophen] Itching         Review of Systems   Review of Systems   Constitutional: Negative for chills and fever. HENT: Negative for congestion, ear pain, rhinorrhea and sore throat. Respiratory: Positive for chest tightness and shortness of breath. Negative for cough. Cardiovascular: Negative for chest pain, palpitations and leg swelling. Gastrointestinal: Negative for abdominal pain, nausea and vomiting. Genitourinary: Negative for decreased urine volume, dysuria, flank pain and hematuria. Musculoskeletal: Negative for back pain and joint swelling. Skin: Negative for color change, rash and wound. Neurological: Negative for dizziness, syncope, speech difficulty, weakness, light-headedness and headaches. Psychiatric/Behavioral: The patient is nervous/anxious.           Physical Exam   General appearance -overweight, well appearing, and in no distress  Eyes - pupils equal and reactive, extraocular eye movements intact  ENT - mucous membranes moist, pharynx normal without lesions  Neck - supple, no significant adenopathy; non-tender to palpation  Chest - clear to auscultation, no wheezes, rales or rhonchi; non-tender to palpation  Heart - normal rate and regular rhythm, S1 and S2 normal, no murmurs noted  Abdomen - soft, nontender, nondistended, no masses or organomegaly  Musculoskeletal - no joint tenderness, deformity or swelling; normal ROM  Extremities - peripheral pulses normal, no pedal edema  Skin - normal coloration and turgor, no rashes  Neurological - alert, oriented x3, normal speech, no focal findings or movement disorder noted    Diagnostic Study Results     Labs -     Recent Results (from the past 12 hour(s))   CBC WITH AUTOMATED DIFF    Collection Time: 07/16/19  2:28 AM   Result Value Ref Range    WBC 9.0 3.6 - 11.0 K/uL    RBC 4.30 3.80 - 5.20 M/uL    HGB 10.9 (L) 11.5 - 16.0 g/dL    HCT 35.4 35.0 - 47.0 %    MCV 82.3 80.0 - 99.0 FL    MCH 25.3 (L) 26.0 - 34.0 PG    MCHC 30.8 30.0 - 36.5 g/dL    RDW 13.9 11.5 - 14.5 %    PLATELET 055 347 - 420 K/uL    MPV 10.2 8.9 - 12.9 FL    NRBC 0.0 0  WBC    ABSOLUTE NRBC 0.00 0.00 - 0.01 K/uL    NEUTROPHILS 74 32 - 75 %    LYMPHOCYTES 18 12 - 49 %    MONOCYTES 6 5 - 13 %    EOSINOPHILS 1 0 - 7 %    BASOPHILS 0 0 - 1 %    IMMATURE GRANULOCYTES 1 (H) 0.0 - 0.5 %    ABS. NEUTROPHILS 6.7 1.8 - 8.0 K/UL    ABS. LYMPHOCYTES 1.6 0.8 - 3.5 K/UL    ABS. MONOCYTES 0.5 0.0 - 1.0 K/UL    ABS. EOSINOPHILS 0.1 0.0 - 0.4 K/UL    ABS. BASOPHILS 0.0 0.0 - 0.1 K/UL    ABS. IMM. GRANS. 0.1 (H) 0.00 - 0.04 K/UL    DF AUTOMATED     METABOLIC PANEL, COMPREHENSIVE    Collection Time: 07/16/19  2:28 AM   Result Value Ref Range    Sodium 137 136 - 145 mmol/L    Potassium 2.9 (L) 3.5 - 5.1 mmol/L    Chloride 101 97 - 108 mmol/L    CO2 28 21 - 32 mmol/L    Anion gap 8 5 - 15 mmol/L    Glucose 175 (H) 65 - 100 mg/dL    BUN 7 6 - 20 MG/DL    Creatinine 1.01 0.55 - 1.02 MG/DL    BUN/Creatinine ratio 7 (L) 12 - 20      GFR est AA >60 >60 ml/min/1.73m2    GFR est non-AA >60 >60 ml/min/1.73m2    Calcium 9.3 8.5 - 10.1 MG/DL    Bilirubin, total 0.6 0.2 - 1.0 MG/DL    ALT (SGPT) 27 12 - 78 U/L    AST (SGOT) 17 15 - 37 U/L    Alk.  phosphatase 69 45 - 117 U/L    Protein, total 7.2 6.4 - 8.2 g/dL    Albumin 3.4 (L) 3.5 - 5.0 g/dL    Globulin 3.8 2.0 - 4.0 g/dL    A-G Ratio 0.9 (L) 1.1 - 2.2     SAMPLES BEING HELD    Collection Time: 07/16/19  2:28 AM   Result Value Ref Range    SAMPLES BEING HELD BL     COMMENT        Add-on orders for these samples will be processed based on acceptable specimen integrity and analyte stability, which may vary by analyte. D DIMER    Collection Time: 07/16/19  2:28 AM   Result Value Ref Range    D-dimer 0.87 (H) 0.00 - 0.65 mg/L FEU       Radiologic Studies -   XR CHEST PA LAT    (Results Pending)     CT Results  (Last 48 hours)    None        CXR Results  (Last 48 hours)    None            Medical Decision Making   I am the first provider for this patient. I reviewed the vital signs, available nursing notes, past medical history, past surgical history, family history and social history. Vital Signs-Reviewed the patient's vital signs. Patient Vitals for the past 12 hrs:   Temp Pulse Resp BP SpO2   07/16/19 0200 98.6 °F (37 °C) 68 18 (!) 140/95 100 %           Records Reviewed: Nursing Notes and Old Medical Records    Provider Notes (Medical Decision Making):   Differential diagnosis: Pneumonia, asthma exacerbation, bronchitis, congestive heart failure, PE  ED Course:   Initial assessment performed. The patients presenting problems have been discussed, and they are in agreement with the care plan formulated and outlined with them. I have encouraged them to ask questions as they arise throughout their visit. Disposition:  Patient feeling much better after treatment in the ED. Will discharge with steroid taper and instructed patient to keep close monitor of her blood sugar. DC home with PCP follow-up    PLAN:  1. Discharge Medication List as of 7/16/2019  6:39 AM      CONTINUE these medications which have NOT CHANGED    Details   metFORMIN (GLUCOPHAGE) 850 mg tablet Take 1 Tab by mouth two (2) times daily (with meals). , Print, Disp-60 Tab, R-0      carvedilol (COREG) 25 mg tablet Take 1 Tab by mouth two (2) times daily (with meals). , Print, Disp-60 Tab, R-0      spironolactone (ALDACTONE) 50 mg tablet Take 1 Tab by mouth daily. , Print, Disp-30 Tab, R-0      lisinopril (PRINIVIL, ZESTRIL) 40 mg tablet Take 40 mg by mouth daily. , Historical Med      cetirizine (ZYRTEC) 10 mg tablet Take 10 mg by mouth daily. , Historical Med      sertraline (ZOLOFT) 50 mg tablet Take 50 mg by mouth daily. , Historical Med      montelukast (SINGULAIR) 10 mg tablet Take 10 mg by mouth daily. , Historical Med      albuterol-ipratropium (DUO-NEB) 2.5 mg-0.5 mg/3 ml nebu 3 mL by Nebulization route every six (6) hours as needed., Historical Med      albuterol (PROAIR HFA) 90 mcg/actuation inhaler Take 2 Puffs by inhalation every four (4) hours as needed., Historical Med      omeprazole (PRILOSEC) 20 mg capsule Take 20 mg by mouth Daily (before breakfast). , Historical Med           2. Follow-up Information     Follow up With Specialties Details Why Contact Info    Connie Waters NP Nurse Practitioner In 2 days  [address] [phone]      [facility] EMERGENCY DEPT Emergency Medicine   [address] [place] [address]  [address] [place] [address]  [phone]        Return to ED if worse     Diagnosis     Clinical Impression:   1. Dyspnea, unspecified type    2. Mild intermittent asthma with acute exacerbation    3.  Hyperglycemia

## 2019-07-16 NOTE — DISCHARGE INSTRUCTIONS

## 2019-10-31 ENCOUNTER — OFFICE VISIT (OUTPATIENT)
Dept: URGENT CARE | Age: 39
End: 2019-10-31

## 2019-10-31 VITALS
WEIGHT: 204 LBS | OXYGEN SATURATION: 98 % | RESPIRATION RATE: 18 BRPM | BODY MASS INDEX: 38.51 KG/M2 | TEMPERATURE: 98.5 F | SYSTOLIC BLOOD PRESSURE: 205 MMHG | HEART RATE: 82 BPM | DIASTOLIC BLOOD PRESSURE: 123 MMHG | HEIGHT: 61 IN

## 2019-10-31 DIAGNOSIS — R03.0 ELEVATED BLOOD PRESSURE READING: ICD-10-CM

## 2019-10-31 DIAGNOSIS — K08.89 PAIN, DENTAL: Primary | ICD-10-CM

## 2019-10-31 RX ORDER — LIDOCAINE HYDROCHLORIDE 20 MG/ML
SOLUTION OROPHARYNGEAL
Qty: 100 ML | Refills: 0 | Status: SHIPPED | OUTPATIENT
Start: 2019-10-31 | End: 2020-05-12

## 2019-10-31 NOTE — PROGRESS NOTES
43 yo female here for \"wisdom tooth pain\"  Promotes has \"4 wisdom teeth on top row\" that have never come down. During interview this is where she is stating she has pain, later during exam, she is stating that pain is only in area of lower jaw where she has a missing tooth on lower left mandible. There is no swelling. She is chewing and eating ok. And denies any fever, chills, malaise or sore throat. Says she saw oral surgeon and stated needs teeth out. hasnt scheduled date of surgery. Pain 7/10 currently. Non radiating. BP noted high today. Denies: chest pain, SOB, visual disturbance, headache, dizziness or weakness.                 Past Medical History:   Diagnosis Date    Asthma     GERD (gastroesophageal reflux disease)     Hypertension         Past Surgical History:   Procedure Laterality Date    HX GYN      ovarian cyst         Family History   Problem Relation Age of Onset    Diabetes Mother     Asthma Father         Social History     Socioeconomic History    Marital status:      Spouse name: Not on file    Number of children: Not on file    Years of education: Not on file    Highest education level: Not on file   Occupational History    Not on file   Social Needs    Financial resource strain: Not on file    Food insecurity:     Worry: Not on file     Inability: Not on file    Transportation needs:     Medical: Not on file     Non-medical: Not on file   Tobacco Use    Smoking status: Current Every Day Smoker     Packs/day: 0.50    Smokeless tobacco: Never Used   Substance and Sexual Activity    Alcohol use: Yes     Comment: socially    Drug use: No    Sexual activity: Not on file   Lifestyle    Physical activity:     Days per week: Not on file     Minutes per session: Not on file    Stress: Not on file   Relationships    Social connections:     Talks on phone: Not on file     Gets together: Not on file     Attends Baptism service: Not on file     Active member of club or organization: Not on file     Attends meetings of clubs or organizations: Not on file     Relationship status: Not on file    Intimate partner violence:     Fear of current or ex partner: Not on file     Emotionally abused: Not on file     Physically abused: Not on file     Forced sexual activity: Not on file   Other Topics Concern     Service Not Asked    Blood Transfusions Not Asked    Caffeine Concern Not Asked    Occupational Exposure Not Asked   Kate Blizzard Hazards Not Asked    Sleep Concern Not Asked    Stress Concern Not Asked    Weight Concern Not Asked    Special Diet Not Asked    Back Care Not Asked    Exercise Not Asked    Bike Helmet Not Asked   [address] [facility] Not Asked    Self-Exams Not Asked   Social History Narrative    Not on file                ALLERGIES: Amlodipine; Percocet [oxycodone-acetaminophen]; Sulfa (sulfonamide antibiotics); and Vicodin [hydrocodone-acetaminophen]    Review of Systems   All other systems reviewed and are negative. Vitals:    10/31/19 1648 10/31/19 1713   BP: (!) 227/131 (!) 205/123   Pulse: 82    Resp: 18    Temp: 98.5 °F (36.9 °C)    SpO2: 98%    Weight: 204 lb (92.5 kg)    Height: 5' 1\" (1.549 m)        Physical Exam   Constitutional: She is oriented to person, place, and time. No distress. HENT:   Mouth/Throat:       No teeth tender with palpation or movement. No bleeding gums. No oral lesions. No swelling of oral cavity. Eyes: Pupils are equal, round, and reactive to light. Conjunctivae and EOM are normal.   Neck: Normal range of motion. Neck supple. No tracheal deviation present. No thyromegaly present. No carotid bruits   Cardiovascular: Normal rate, regular rhythm and normal heart sounds. Exam reveals no gallop and no friction rub. No murmur heard. Pulmonary/Chest: Effort normal and breath sounds normal. No respiratory distress. She has no wheezes. She has no rales.    Abdominal:   No abdominal bruits   Lymphadenopathy:     She has no cervical adenopathy. Neurological: She is alert and oriented to person, place, and time. No cranial nerve deficit. Coordination normal.   Skin: She is not diaphoretic. Psychiatric: She has a normal mood and affect. Her behavior is normal. Thought content normal.       MDM     Differential Diagnosis; Clinical Impression; Plan:       CLINICAL IMPRESSION:  (K08.89) Pain, dental  (primary encounter diagnosis)  (R03.0) Elevated blood pressure reading    Orders Placed This Encounter      lidocaine (LIDOCAINE VISCOUS) 2 % solution          Sig: swish and spit 15 mL every 4 hours as needed for pain. Dispense:  100 mL          Refill:  0      Plan:  No evidence of dental pathology on exam. No abnormality seen in area of pain. Asymptomatic elevated BP- Please monitor BPs, call primary care TOMORROW to schedule appointment within 1 week to re assess your blood pressure- go to ED for new, worsening or changes. Call your oral surgeon tomorrow to schedule follow up within next 1 week. Lidocaine swish and spit. We have reviewed concerning signs/symptoms, normal vs abnormal progression of medical condition and when to seek immediate medical attention. You should see your PCP for updates on your routine health maintenance.              Procedures

## 2019-10-31 NOTE — PATIENT INSTRUCTIONS
Please monitor BPs, call primary care TOMORROW to schedule appointment within 1 week to re assess your blood pressure  Call your oral surgeon tomorrow to schedule follow up within next 1 week. Dental Pain: After Your Visit  Your Care Instructions  The most common cause of dental pain is tooth decay. It can also be caused by an infection of the tooth (abscess) or gum, a tooth that has not broken all the way through the gum (impacted tooth), or a problem with the nerve-filled center of the tooth. Follow-up care is a key part of your treatment and safety. Be sure to make and go to all appointments, and call your doctor if you are having problems. Its also a good idea to know your test results and keep a list of the medicines you take. How can you care for yourself at home? · Contact a dentist for follow-up care. · Put ice or a cold pack on the outside of your mouth for 10 to 20 minutes at a time to reduce pain and swelling. Put a thin cloth between the ice and your skin. · Take an over-the-counter pain medicine, such as acetaminophen (Tylenol), ibuprofen (Advil, Motrin), or naproxen (Aleve). Read and follow all instructions on the label. · Do not take two or more pain medicines at the same time unless the doctor told you to. Many pain medicines have acetaminophen, which is Tylenol. Too much acetaminophen (Tylenol) can be harmful. · Rinse your mouth with warm salt water every 2 hours to help relieve pain and swelling from an infected tooth. Mix 1 teaspoon of salt in 8 ounces of water. · If your doctor prescribed antibiotics, take them as directed. Do not stop taking them just because you feel better. You need to take the full course of antibiotics. When should you call for help? Call your doctor now or seek immediate medical care if:  · You have signs of infection, such as:  ¨ Increased pain, swelling, warmth, or redness. ¨ Pus draining from the gum, tooth, or face. ¨ A fever.   Watch closely for changes in your health, and be sure to contact your doctor if:  · You do not get better as expected. Where can you learn more? Go to [employer]  Enter V264 in the search box to learn more about \"Dental Pain: After Your Visit. \"   © 5797-6196 [employer]wise, Incorporated. Care instructions adapted under license by [address] [place] [address] (which disclaims liability or warranty for this information). This care instruction is for use with your licensed healthcare professional. If you have questions about a medical condition or this instruction, always ask your healthcare professional. [place] [address] any warranty or liability for your use of this information. Content Version: 16.7.751677;  Last Revised: May 17, 2013

## 2019-11-14 PROCEDURE — 75810000283 HC INJECTION NERVE BLOCK

## 2019-11-14 PROCEDURE — 96374 THER/PROPH/DIAG INJ IV PUSH: CPT

## 2019-11-14 PROCEDURE — 99284 EMERGENCY DEPT VISIT MOD MDM: CPT

## 2019-11-15 ENCOUNTER — HOSPITAL ENCOUNTER (EMERGENCY)
Age: 39
Discharge: HOME | End: 2019-11-15
Attending: EMERGENCY MEDICINE
Payer: SELF-PAY

## 2019-11-15 VITALS
WEIGHT: 202.6 LBS | BODY MASS INDEX: 38.25 KG/M2 | HEIGHT: 61 IN | RESPIRATION RATE: 16 BRPM | DIASTOLIC BLOOD PRESSURE: 134 MMHG | TEMPERATURE: 98.6 F | HEART RATE: 89 BPM | SYSTOLIC BLOOD PRESSURE: 207 MMHG | OXYGEN SATURATION: 97 %

## 2019-11-15 DIAGNOSIS — K08.89 PAIN, DENTAL: Primary | ICD-10-CM

## 2019-11-15 PROCEDURE — 74011000250 HC RX REV CODE- 250: Performed by: EMERGENCY MEDICINE

## 2019-11-15 PROCEDURE — 74011250637 HC RX REV CODE- 250/637: Performed by: EMERGENCY MEDICINE

## 2019-11-15 PROCEDURE — 74011250636 HC RX REV CODE- 250/636: Performed by: EMERGENCY MEDICINE

## 2019-11-15 RX ORDER — KETOROLAC TROMETHAMINE 30 MG/ML
30 INJECTION, SOLUTION INTRAMUSCULAR; INTRAVENOUS
Status: COMPLETED | OUTPATIENT
Start: 2019-11-15 | End: 2019-11-15

## 2019-11-15 RX ORDER — PENICILLIN V POTASSIUM 250 MG/1
500 TABLET, FILM COATED ORAL
Status: DISCONTINUED | OUTPATIENT
Start: 2019-11-15 | End: 2019-11-15 | Stop reason: HOSPADM

## 2019-11-15 RX ORDER — LIDOCAINE HYDROCHLORIDE 40 MG/ML
SOLUTION TOPICAL
Status: COMPLETED | OUTPATIENT
Start: 2019-11-15 | End: 2019-11-15

## 2019-11-15 RX ORDER — DIPHENHYDRAMINE HCL 12.5MG/5ML
50 LIQUID (ML) ORAL
Status: COMPLETED | OUTPATIENT
Start: 2019-11-15 | End: 2019-11-15

## 2019-11-15 RX ORDER — BUPIVACAINE HYDROCHLORIDE 5 MG/ML
2 INJECTION, SOLUTION EPIDURAL; INTRACAUDAL ONCE
Status: COMPLETED | OUTPATIENT
Start: 2019-11-15 | End: 2019-11-15

## 2019-11-15 RX ADMIN — BENZOCAINE, BUTAMBEN, AND TETRACAINE HYDROCHLORIDE 1 SPRAY: .028; .004; .004 AEROSOL, SPRAY TOPICAL at 00:37

## 2019-11-15 RX ADMIN — BUPIVACAINE HYDROCHLORIDE 10 MG: 5 INJECTION, SOLUTION EPIDURAL; INTRACAUDAL at 02:49

## 2019-11-15 RX ADMIN — DIPHENHYDRAMINE HYDROCHLORIDE 50 MG: 25 LIQUID ORAL at 00:37

## 2019-11-15 RX ADMIN — LIDOCAINE HYDROCHLORIDE: 40 SOLUTION TOPICAL at 00:38

## 2019-11-15 RX ADMIN — KETOROLAC TROMETHAMINE 30 MG: 30 INJECTION, SOLUTION INTRAMUSCULAR at 02:47

## 2019-11-15 NOTE — LETTER
[address] 
[facility] EMERGENCY DEPT 
[address] [place] [address] [place] [address] 
[phone] Work/School Note Date: 11/14/2019 To Whom It May concern: Marcia Andre was seen and treated today in the emergency room by the following provider(s): 
Attending Provider: Diana Coppola MD. Marcia Andre may return to work on 11/16/2019.  
 
Sincerely, 
 
 
 
 
Rey Scherer MD

## 2019-11-15 NOTE — ED NOTES
Vitals taken and all medical equipment was removed from the patient at this time. Just waiting on the provider to bring discharge papers at this time.

## 2019-11-15 NOTE — ED PROVIDER NOTES
EMERGENCY DEPARTMENT HISTORY AND PHYSICAL EXAM      Date: 11/15/2019  Patient Name: Gisel Monday    History of Presenting Illness     Chief Complaint   Patient presents with    Dental Pain     Patient stated that she is having intermittent dental pain for over two weeks. Patient stated that the pain is on the left lower side and is the worst that it has been since it started. Patient took two Percocet about an hour ago with no relief. Pain is causing a headache as well. Patient hypertensive with a history of hypertension. History Provided By: Patient    HPI: Gisel Monday, 44 y.o. female with PMHx as noted below presents the emergency department with chief complaint of dental pain. Patient notes she is been experiencing pain in her left lower third molar now for the last 2 weeks. Pain is been constant and progressively worsening. Worse with chewing. Patient notes that she took Percocet earlier this evening with no relief. She does not have any associated blurred vision or focal neurologic symptoms. She has had no facial swelling or neck pain. Denies any fevers or chills or inability to tolerate p.o. PCP: Ector Ponce NP    Current Outpatient Medications   Medication Sig Dispense Refill    lidocaine (LIDOCAINE VISCOUS) 2 % solution swish and spit 15 mL every 4 hours as needed for pain. 100 mL 0    metFORMIN (GLUCOPHAGE) 850 mg tablet Take 1 Tab by mouth two (2) times daily (with meals). 60 Tab 0    carvedilol (COREG) 25 mg tablet Take 1 Tab by mouth two (2) times daily (with meals). 60 Tab 0    lisinopril (PRINIVIL, ZESTRIL) 40 mg tablet Take 40 mg by mouth daily.  cetirizine (ZYRTEC) 10 mg tablet Take 10 mg by mouth daily.  sertraline (ZOLOFT) 50 mg tablet Take 50 mg by mouth daily.  montelukast (SINGULAIR) 10 mg tablet Take 10 mg by mouth daily.  albuterol-ipratropium (DUO-NEB) 2.5 mg-0.5 mg/3 ml nebu 3 mL by Nebulization route every six (6) hours as needed.  albuterol (PROAIR HFA) 90 mcg/actuation inhaler Take 2 Puffs by inhalation every four (4) hours as needed.  omeprazole (PRILOSEC) 20 mg capsule Take 20 mg by mouth Daily (before breakfast). Past History     Past Medical History:  Past Medical History:   Diagnosis Date    Asthma     GERD (gastroesophageal reflux disease)     Hypertension        Past Surgical History:  Past Surgical History:   Procedure Laterality Date    HX GYN      ovarian cyst       Family History:  Family History   Problem Relation Age of Onset    Diabetes Mother     Asthma Father        Social History:  Social History     Tobacco Use    Smoking status: Current Every Day Smoker     Packs/day: 0.50    Smokeless tobacco: Never Used   Substance Use Topics    Alcohol use: Yes     Comment: socially    Drug use: No       Allergies: Allergies   Allergen Reactions    Amlodipine Other (comments)     Pt states it makes her BP higher    Percocet [Oxycodone-Acetaminophen] Itching     Denies this as an allergy.  Sulfa (Sulfonamide Antibiotics) Itching    Vicodin [Hydrocodone-Acetaminophen] Itching         Review of Systems   Review of Systems  Constitutional: Negative for fever, chills, and fatigue. HENT: Negative for congestion, sore throat, rhinorrhea, sneezing and neck stiffness   Eyes: Negative for discharge and redness. Respiratory: Negative for  shortness of breath, wheezing   Cardiovascular: Negative for chest pain, palpitations   Gastrointestinal: Negative for nausea, vomiting, abdominal pain, constipation, diarrhea and blood in stool. Genitourinary: Negative for dysuria, hematuria, flank pain, decreased urine volume, discharge,   Musculoskeletal: Negative for myalgias or joint pain . Skin: Negative for rash or lesions . Neurological: Negative weakness, light-headedness, numbness and headaches.        Physical Exam   Physical Exam    GENERAL: alert and oriented, no acute distress  EYES: PEERL, No injection, discharge or icterus. ENT: Mucous membranes pink and moist.,  There is no facial asymmetry or swelling  NECK: Supple  LUNGS: Airway patent. Non-labored respirations. Breath sounds clear with good air entry bilaterally. HEART: Regular rate and rhythm. No peripheral edema  ABDOMEN: Non-distended and non-tender, without guarding or rebound. SKIN:  warm, dry  MSK/EXTREMITIES: Without swelling, tenderness or deformity, symmetric with normal ROM  NEUROLOGICAL: Alert, oriented      Diagnostic Study Results     Labs -   No results found for this or any previous visit (from the past 12 hour(s)). Radiologic Studies -   No orders to display     CT Results  (Last 48 hours)    None        CXR Results  (Last 48 hours)    None            Medical Decision Making   I am the first provider for this patient. I reviewed the vital signs, available nursing notes, past medical history, past surgical history, family history and social history. Vital Signs-Reviewed the patient's vital signs. Patient Vitals for the past 12 hrs:   Temp Pulse Resp BP SpO2   11/15/19 0351 98.6 °F (37 °C) 89 16 (!) 207/134 97 %   11/14/19 2350 98.8 °F (37.1 °C) 83 18 (!) 229/127 96 %   11/14/19 2254 98.8 °F (37.1 °C) 84 18 (!) 227/140 96 %       Records Reviewed: Nursing Notes and Old Medical Records    Provider Notes (Medical Decision Making): On presentation the patient is well appearing, in no acute distress with normal vital signs. Based on the history and exam the differential diagnosis for this patient includes pulpitis, dental carries, tooth decay, dental abscess. No signs of abscess or significant infection on exam. There is no trismus, soft tissue is within normal limits, the uvula is midline, there is no meningismus and no gross neurologic deficits observed. Supportive care discussed and patient instructed to follow up with a dentist. Referral form provided. Dental block performed with good analgesia.     ED Course:   Initial assessment performed. The patients presenting problems have been discussed, and they are in agreement with the care plan formulated and outlined with them. I have encouraged them to ask questions as they arise throughout their visit. Procedure Note - Dental Block:    Performed by Dustin Veras MD  A inferior alveolar nerve block was performed on the left side. 2 mL of 0.5% bupivicaine was injected. Total time at bedside, performing this procedure was 1-15 minutes. The procedure was tolerated well with any complications. HYPERTENSION COUNSELING:   Patient denies any current chest pain, headache, shortness of breath, lightheadedness, dizziness, or changes in vision. Patient is made aware of their elevated blood pressure and is instructed to follow up this week with their Primary Care for a recheck. Patient is counseled regarding consequences of chronic, uncontrolled hypertension including kidney disease, heart disease, stroke or even death. Patient verbally states their understanding. PROGRESS NOTE:  The patient has been re-evaluated and is ready for discharge. Reviewed available results with patient and have counseled them on diagnosis and care plan. They have expressed understanding, and all their questions have been answered. They agree with plan and agree to have pt F/U as recommended, or return to the ED if their sxs worsen. Discharge instructions have been provided and explained to them, along with reasons to have pt return to the ED. The patient is amenable to discharge so will discharge pt at this time      Disposition:  home    PLAN:  1. Discharge Medication List as of 11/15/2019  3:16 AM        2.    Follow-up Information     Follow up With Specialties Details Why Contact Info    [facility] EMERGENCY DEPT Emergency Medicine  If symptoms worsen [address] [facility] [address]    [address] [facility] Dentistry Schedule an appointment as soon as possible for a visit in 1 day  [address] [facility] [place]  [phone]    Elpidio Salinas NP Nurse Practitioner Schedule an appointment as soon as possible for a visit in 1 day  [address] [facility]  [phone]          Return to ED if worse     Diagnosis     Clinical Impression:   1. Pain, dental        Please note that this dictation was completed with Dragon, computer voice recognition software. Quite often unanticipated grammatical, syntax, homophones, and other interpretive errors are inadvertently transcribed by the computer software. Please disregard these errors. Additionally, please excuse any errors that have escaped final proofreading.

## 2019-11-15 NOTE — DISCHARGE INSTRUCTIONS
Patient Education   Emergency Dental Care  Patient Education        High Blood Pressure: Care Instructions  Overview    It's normal for blood pressure to go up and down throughout the day. But if it stays up, you have high blood pressure. Another name for high blood pressure is hypertension. Despite what a lot of people think, high blood pressure usually doesn't cause headaches or make you feel dizzy or lightheaded. It usually has no symptoms. But it does increase your risk of stroke, heart attack, and other problems. You and your doctor will talk about your risks of these problems based on your blood pressure. Your doctor will give you a goal for your blood pressure. Your goal will be based on your health and your age. Lifestyle changes, such as eating healthy and being active, are always important to help lower blood pressure. You might also take medicine to reach your blood pressure goal.  Follow-up care is a key part of your treatment and safety. Be sure to make and go to all appointments, and call your doctor if you are having problems. It's also a good idea to know your test results and keep a list of the medicines you take. How can you care for yourself at home? Medical treatment  · If you stop taking your medicine, your blood pressure will go back up. You may take one or more types of medicine to lower your blood pressure. Be safe with medicines. Take your medicine exactly as prescribed. Call your doctor if you think you are having a problem with your medicine. · Talk to your doctor before you start taking aspirin every day. Aspirin can help certain people lower their risk of a heart attack or stroke. But taking aspirin isn't right for everyone, because it can cause serious bleeding. · See your doctor regularly. You may need to see the doctor more often at first or until your blood pressure comes down.   · If you are taking blood pressure medicine, talk to your doctor before you take decongestants or anti-inflammatory medicine, such as ibuprofen. Some of these medicines can raise blood pressure. · Learn how to check your blood pressure at home. Lifestyle changes  · Stay at a healthy weight. This is especially important if you put on weight around the waist. Losing even 10 pounds can help you lower your blood pressure. · If your doctor recommends it, get more exercise. Walking is a good choice. Bit by bit, increase the amount you walk every day. Try for at least 30 minutes on most days of the week. You also may want to swim, bike, or do other activities. · Avoid or limit alcohol. Talk to your doctor about whether you can drink any alcohol. · Try to limit how much sodium you eat to less than 2,300 milligrams (mg) a day. Your doctor may ask you to try to eat less than 1,500 mg a day. · Eat plenty of fruits (such as bananas and oranges), vegetables, legumes, whole grains, and low-fat dairy products. · Lower the amount of saturated fat in your diet. Saturated fat is found in animal products such as milk, cheese, and meat. Limiting these foods may help you lose weight and also lower your risk for heart disease. · Do not smoke. Smoking increases your risk for heart attack and stroke. If you need help quitting, talk to your doctor about stop-smoking programs and medicines. These can increase your chances of quitting for good. When should you call for help? Call  911 anytime you think you may need emergency care. This may mean having symptoms that suggest that your blood pressure is causing a serious heart or blood vessel problem. Your blood pressure may be over 180/120.   For example, call  911 if:    · You have symptoms of a heart attack. These may include:  ? Chest pain or pressure, or a strange feeling in the chest.  ? Sweating. ? Shortness of breath. ? Nausea or vomiting. ? Pain, pressure, or a strange feeling in the back, neck, jaw, or upper belly or in one or both shoulders or arms.   ? Lightheadedness or sudden weakness. ? A fast or irregular heartbeat.     · You have symptoms of a stroke. These may include:  ? Sudden numbness, tingling, weakness, or loss of movement in your face, arm, or leg, especially on only one side of your body. ? Sudden vision changes. ? Sudden trouble speaking. ? Sudden confusion or trouble understanding simple statements. ? Sudden problems with walking or balance. ? A sudden, severe headache that is different from past headaches.     · You have severe back or belly pain.    Do not wait until your blood pressure comes down on its own. Get help right away.   Call your doctor now or seek immediate care if:    · Your blood pressure is much higher than normal (such as 180/120 or higher), but you don't have symptoms.     · You think high blood pressure is causing symptoms, such as:  ? Severe headache.  ? Blurry vision.    Watch closely for changes in your health, and be sure to contact your doctor if:    · Your blood pressure measures higher than your doctor recommends at least 2 times. That means the top number is higher or the bottom number is higher, or both.     · You think you may be having side effects from your blood pressure medicine. Where can you learn more? Go to http://rosemary-marisela.info/. Enter X396 in the search box to learn more about \"High Blood Pressure: Care Instructions. \"  Current as of: April 9, 2019  Content Version: 12.2  © 7077-7456 [employer]. Care instructions adapted under license by [employer] (which disclaims liability or warranty for this information). If you have questions about a medical condition or this instruction, always ask your healthcare professional. [place] [address] any warranty or liability for your use of this information.              [address]  [address] [address] [address]   Phone: [phone]    [facility] [address], [place], [address] Phone: [phone], select option (2) to confirm time for treatment     [facility]  [address] [address] [address]   Monday-Friday: 8am-4pm  Phone: [phone] of Dentistry Urgent [address] [facility], [address] [facility] [address] [place] [address]  Phone: [phone] to confirm a time for emergency treatment  Pediatrics: [phone]     [facility]  Phone: [phone]    [facility]  [address], [address] [address]   Phone [phone] or [phone]  Hours [id number] (extractions)  Simple tooth extraction: $60 per tooth, $55 per x-ray     Liu Celis [facility] (in [place])  [facility] AT [place] only  Phone: [phone], leave message saying you need an appointment to register  Hours: Wed 6-9p     [facility]  Disabled and over age 58 only  Phone: [phone]    Non-Urgent [facility] ([facility])  [address] [place] [address]  Phone: [phone]     [address] [facility]   Dental Clinic: [phone]  Oral Surgery Clinic: [phone]         Local Dentists    [facility]  [address] [facility]  [phone]    Myrna Laureano  [address] [place] [address]  [phone]               Tooth and Gum Pain: Care Instructions  Your Care Instructions    The most common causes of dental pain are tooth decay and gum disease. Pain can also be caused by an infection of the tooth (abscess) or the gums. Or you may have pain from a broken or cracked tooth. Other causes of pain include infection and damage to a tooth from nervous grinding of your teeth. A wisdom tooth can be painful when it is coming in but cannot break through the gum.  It can also be painful when the tooth is only partway in and extra gum tissue has formed around it. The tissue can get inflamed (pericoronitis), and sometimes it gets infected. Prompt dental care can help find the cause of your toothache and keep the tooth from dying or gum disease from getting worse. Self-care at home may reduce your pain and discomfort. Follow-up care is a key part of your treatment and safety. Be sure to make and go to all appointments, and call your dentist or doctor if you are having problems. It's also a good idea to know your test results and keep a list of the medicines you take. How can you care for yourself at home? · To reduce pain and facial swelling, put an ice or cold pack on the outside of your cheek for 10 to 20 minutes at a time. Put a thin cloth between the ice and your skin. Do not use heat. · If your doctor prescribed antibiotics, take them as directed. Do not stop taking them just because you feel better. You need to take the full course of antibiotics. · Ask your doctor if you can take an over-the-counter pain medicine, such as acetaminophen (Tylenol), ibuprofen (Advil, Motrin), or naproxen (Aleve). Be safe with medicines. Read and follow all instructions on the label. · Avoid very hot, cold, or sweet foods and drinks if they increase your pain. · Rinse your mouth with warm salt water every 2 hours to help relieve pain and swelling. Mix 1 teaspoon of salt in 8 ounces of water. · Talk to your dentist about using special toothpaste for sensitive teeth. To reduce pain on contact with heat or cold or when brushing, brush with this toothpaste regularly or rub a small amount of the paste on the sensitive area with a clean finger 2 or 3 times a day. Floss gently between your teeth. · Do not smoke or use spit tobacco. Tobacco use can make gum problems worse, decreases your ability to fight infection in your gums, and delays healing. If you need help quitting, talk to your doctor about stop-smoking programs and medicines. These can increase your chances of quitting for good. When should you call for help? Call 911 anytime you think you may need emergency care. For example, call if:    · You have trouble breathing.    Call your dentist or doctor now or seek immediate medical care if:    · You have signs of infection, such as:  ? Increased pain, swelling, warmth, or redness. ? Red streaks leading from the area. ? Pus draining from the area. ? A fever.    Watch closely for changes in your health, and be sure to contact your doctor if:    · You do not get better as expected. Where can you learn more? Go to http://rosemary-marisela.info/. Enter 0363 [id number] in the search box to learn more about \"Tooth and Gum Pain: Care Instructions. \"  Current as of: October 3, 2018  Content Version: 12.2  © 8142-7778 [employer]. Care instructions adapted under license by [employer] (which disclaims liability or warranty for this information). If you have questions about a medical condition or this instruction, always ask your healthcare professional. [address] any warranty or liability for your use of this information.

## 2019-11-15 NOTE — ED NOTES
Patient's  came up to the registration desk and stated that the patient was starting to feel faint. Patient brought back and revitalized.

## 2020-05-12 ENCOUNTER — APPOINTMENT (OUTPATIENT)
Dept: CT IMAGING | Age: 40
End: 2020-05-12
Attending: EMERGENCY MEDICINE
Payer: SELF-PAY

## 2020-05-12 ENCOUNTER — HOSPITAL ENCOUNTER (EMERGENCY)
Age: 40
Discharge: HOME | End: 2020-05-12
Attending: EMERGENCY MEDICINE
Payer: SELF-PAY

## 2020-05-12 VITALS
RESPIRATION RATE: 15 BRPM | DIASTOLIC BLOOD PRESSURE: 97 MMHG | HEART RATE: 73 BPM | SYSTOLIC BLOOD PRESSURE: 174 MMHG | TEMPERATURE: 97.8 F | OXYGEN SATURATION: 100 %

## 2020-05-12 DIAGNOSIS — S16.1XXA ACUTE STRAIN OF NECK MUSCLE, INITIAL ENCOUNTER: Primary | ICD-10-CM

## 2020-05-12 PROCEDURE — 99284 EMERGENCY DEPT VISIT MOD MDM: CPT

## 2020-05-12 PROCEDURE — 72125 CT NECK SPINE W/O DYE: CPT

## 2020-05-12 PROCEDURE — 96374 THER/PROPH/DIAG INJ IV PUSH: CPT

## 2020-05-12 PROCEDURE — 74011250636 HC RX REV CODE- 250/636: Performed by: EMERGENCY MEDICINE

## 2020-05-12 PROCEDURE — 96375 TX/PRO/DX INJ NEW DRUG ADDON: CPT

## 2020-05-12 PROCEDURE — 74011250637 HC RX REV CODE- 250/637: Performed by: EMERGENCY MEDICINE

## 2020-05-12 RX ORDER — BISMUTH SUBSALICYLATE 262 MG
1 TABLET,CHEWABLE ORAL DAILY
COMMUNITY

## 2020-05-12 RX ORDER — CYCLOBENZAPRINE HCL 10 MG
10 TABLET ORAL
Qty: 10 TAB | Refills: 0 | Status: SHIPPED | OUTPATIENT
Start: 2020-05-12 | End: 2021-12-07

## 2020-05-12 RX ORDER — ACETAMINOPHEN 500 MG
TABLET ORAL
COMMUNITY
End: 2021-12-07

## 2020-05-12 RX ORDER — DIAZEPAM 5 MG/1
5 TABLET ORAL
Status: COMPLETED | OUTPATIENT
Start: 2020-05-12 | End: 2020-05-12

## 2020-05-12 RX ORDER — MORPHINE SULFATE 10 MG/ML
6 INJECTION, SOLUTION INTRAMUSCULAR; INTRAVENOUS
Status: COMPLETED | OUTPATIENT
Start: 2020-05-12 | End: 2020-05-12

## 2020-05-12 RX ORDER — CHOLECALCIFEROL (VITAMIN D3) 50 MCG
CAPSULE ORAL DAILY
COMMUNITY
End: 2021-12-07

## 2020-05-12 RX ORDER — PANTOPRAZOLE SODIUM 20 MG/1
20 TABLET, DELAYED RELEASE ORAL DAILY
COMMUNITY
End: 2021-12-07

## 2020-05-12 RX ORDER — ONDANSETRON 2 MG/ML
4 INJECTION INTRAMUSCULAR; INTRAVENOUS
Status: COMPLETED | OUTPATIENT
Start: 2020-05-12 | End: 2020-05-12

## 2020-05-12 RX ORDER — KETOROLAC TROMETHAMINE 30 MG/ML
30 INJECTION, SOLUTION INTRAMUSCULAR; INTRAVENOUS
Status: COMPLETED | OUTPATIENT
Start: 2020-05-12 | End: 2020-05-12

## 2020-05-12 RX ORDER — OXYCODONE HYDROCHLORIDE 5 MG/1
5 TABLET ORAL
Qty: 9 TAB | Refills: 0 | Status: SHIPPED | OUTPATIENT
Start: 2020-05-12 | End: 2020-05-15

## 2020-05-12 RX ADMIN — KETOROLAC TROMETHAMINE 30 MG: 30 INJECTION, SOLUTION INTRAMUSCULAR at 09:16

## 2020-05-12 RX ADMIN — MORPHINE SULFATE 6 MG: 10 INJECTION INTRAVENOUS at 09:13

## 2020-05-12 RX ADMIN — DIAZEPAM 5 MG: 5 TABLET ORAL at 09:28

## 2020-05-12 RX ADMIN — ONDANSETRON 4 MG: 2 INJECTION INTRAMUSCULAR; INTRAVENOUS at 09:13

## 2020-05-12 NOTE — LETTER
[address] 
\[facility]\"" EMERGENCY DEPT 
[address] [place] [id number] 
[phone] Work/School Note Date: 5/12/2020 To Whom It May concern: Luis F Graff was seen and treated today in the emergency room by the following provider(s): 
Attending Provider: Megan Gates MD. Please excuse patient from work thousand 20 through May 14, 2020. Sincerely, Nayan Juan MD

## 2020-05-12 NOTE — PROGRESS NOTES
Nurse Clarification of the Prior to Admission Medication Regimen     The patient was interviewed regarding clarification of the prior to admission medication regimen. The individual(s) listed above were questioned regarding the patient's use of any other inhalers, topical products, over the counter medications, herbal medications, vitamin products or ophthalmic/nasal/otic medication use. Information Obtained From: patient    Recommendations/Findings: The following amendments were made to the patient's active medication list on file at [address]:     1) Additions:    Multivitamin po qd   Pantoprazole 20 mg po every day before breakfast   Tylenol extra strength 1-2 tablet q6h prn    2) Removals:    Lidocaine 2% 15ml q4h prn    prilosec 20 mg qd    3) Changes:   (Old regimen: metformin 850 g bid /New regimen: changed to 500 mg bid)       PTA medication list was corrected to the following:     Prior to Admission Medications   Prescriptions Last Dose Informant Taking? B.infantis-B.ani-B.long-B.bifi (Probiotic 4X) 10-15 mg TbEC   Yes   Sig: Take  by mouth daily. acetaminophen (Tylenol Extra Strength) 500 mg tablet   Yes   Sig: Take  by mouth every six (6) hours as needed for Pain. albuterol (PROAIR HFA) 90 mcg/actuation inhaler   No   Sig: Take 2 Puffs by inhalation every four (4) hours as needed. albuterol-ipratropium (DUO-NEB) 2.5 mg-0.5 mg/3 ml nebu   No   Sig: 3 mL by Nebulization route every six (6) hours as needed. carvedilol (COREG) 25 mg tablet   No   Sig: Take 1 Tab by mouth two (2) times daily (with meals). cetirizine (ZYRTEC) 10 mg tablet   No   Sig: Take 10 mg by mouth daily. lisinopril (PRINIVIL, ZESTRIL) 40 mg tablet   No   Sig: Take 40 mg by mouth daily. metFORMIN (GLUCOPHAGE) 850 mg tablet   No   Sig: Take 1 Tab by mouth two (2) times daily (with meals). Patient taking differently: Take 500 mg by mouth two (2) times daily (with meals).    montelukast (SINGULAIR) 10 mg tablet No   Sig: Take 10 mg by mouth daily. multivitamin (ONE A DAY) tablet   Yes   Sig: Take 1 Tab by mouth daily. pantoprazole (Protonix) 20 mg tablet   Yes   Sig: Take 20 mg by mouth daily. sertraline (ZOLOFT) 50 mg tablet   No   Sig: Take 50 mg by mouth daily.       Facility-Administered Medications: None        Thank you,  Opal Rosales

## 2020-05-12 NOTE — ED NOTES
Dr. Bard Morales and Dipti Rouse RN reviewed discharge instructions with the patient. The patient verbalized understanding. All questions and concerns were addressed. The patient declined a wheelchair and is discharged ambulatory in the care of family members with instructions and prescriptions in hand. Pt is alert and oriented x 4. Respirations are clear and unlabored.

## 2020-05-12 NOTE — DISCHARGE INSTRUCTIONS
Patient Education        Neck Strain: Care Instructions  Your Care Instructions    You have strained the muscles and ligaments in your neck. A sudden, awkward movement can strain the neck. This often occurs with falls or car accidents or during certain sports. Everyday activities like working on a computer or sleeping can also cause neck strain if they force you to hold your neck in an awkward position for a long time. It is common for neck pain to get worse for a day or two after an injury, but it should start to feel better after that. You may have more pain and stiffness for several days before it gets better. This is expected. It may take a few weeks or longer for it to heal completely. Good home treatment can help you get better faster and avoid future neck problems. Follow-up care is a key part of your treatment and safety. Be sure to make and go to all appointments, and call your doctor if you are having problems. It's also a good idea to know your test results and keep a list of the medicines you take. How can you care for yourself at home? · If you were given a neck brace (cervical collar) to limit neck motion, wear it as instructed for as many days as your doctor tells you to. Do not wear it longer than you were told to. Wearing a brace for too long can make neck stiffness worse and weaken the neck muscles. · You can try using heat or ice to see if it helps. ? Try using a heating pad on a low or medium setting for 15 to 20 minutes every 2 to 3 hours. Try a warm shower in place of one session with the heating pad. You can also buy single-use heat wraps that last up to 8 hours. ? You can also try an ice pack for 10 to 15 minutes every 2 to 3 hours. · Take pain medicines exactly as directed. ? If the doctor gave you a prescription medicine for pain, take it as prescribed. ? If you are not taking a prescription pain medicine, ask your doctor if you can take an over-the-counter medicine.   · Gently rub the area to relieve pain and help with blood flow. Do not massage the area if it hurts to do so. · Do not do anything that makes the pain worse. Take it easy for a couple of days. You can do your usual activities if they do not hurt your neck or put it at risk for more stress or injury. · Try sleeping on a special neck pillow. Place it under your neck, not under your head. Placing a tightly rolled-up towel under your neck while you sleep will also work. If you use a neck pillow or rolled towel, do not use your regular pillow at the same time. · To prevent future neck pain, do exercises to stretch and strengthen your neck and back. Learn how to use good posture, safe lifting techniques, and proper body mechanics. When should you call for help? Call 911 anytime you think you may need emergency care. For example, call if:    · You are unable to move an arm or a leg at all.   [facility] your doctor now or seek immediate medical care if:    · You have new or worse symptoms in your arms, legs, chest, belly, or buttocks. Symptoms may include:  ? Numbness or tingling. ? Weakness. ? Pain.     · You lose bladder or bowel control.    Watch closely for changes in your health, and be sure to contact your doctor if:    · You are not getting better as expected. Where can you learn more? Go to http://rosemary-marisela.info/  Enter M253 in the search box to learn more about \"Neck Strain: Care Instructions. \"  Current as of: June 26, 2019Content Version: 12.4  © 0264-9153 [employer]. Care instructions adapted under license by [employer] (which disclaims liability or warranty for this information). If you have questions about a medical condition or this instruction, always ask your healthcare professional. [address] any warranty or liability for your use of this information.

## 2020-05-12 NOTE — ED NOTES
Placed soft collar on patients neck and had them preform teach back instructions. Pt left with collar on neck.

## 2020-05-12 NOTE — ED PROVIDER NOTES
EMERGENCY DEPARTMENT HISTORY AND PHYSICAL EXAM      Date: 5/12/2020  Patient Name: Paulo Hinton    History of Presenting Illness     Chief Complaint   Patient presents with    Neck Pain     getting into bed today and felt a \"pop\" in neck pain worse on the R side       History Provided By: Patient    HPI: Paulo Hinton, 44 y.o. female presents to the ED with history of high blood pressure, acid reflux and asthma here with acute onset of right-sided neck pain. Onset of pain occurred this morning as patient was laying back into bed and heard a pop along the right posterior side of her neck. She says she did not fall violently back and it was mostly normal movement getting back into bed. She is not previously had neck injuries or issues and says that she has no concerns for osteoporosis or osteopenia previously. She did not lose consciousness or hit her head when laying back into bed. She rates her pain as 10 out of 10 and denies any chest pain, shortness of breath or palpitations. She denies any difficulty breathing or difficulty swallowing. She has no loss of sensation or ability to move her arms and legs and has not had loss of bowel or bladder control. There are no other complaints, changes, or physical findings at this time. PCP: Jean-Pierre Rodriguez NP    No current facility-administered medications on file prior to encounter. Current Outpatient Medications on File Prior to Encounter   Medication Sig Dispense Refill    multivitamin (ONE A DAY) tablet Take 1 Tab by mouth daily.  B.infantis-B.ani-B.long-B.bifi (Probiotic 4X) 10-15 mg TbEC Take  by mouth daily.  acetaminophen (Tylenol Extra Strength) 500 mg tablet Take  by mouth every six (6) hours as needed for Pain.  pantoprazole (Protonix) 20 mg tablet Take 20 mg by mouth daily.  [DISCONTINUED] lidocaine (LIDOCAINE VISCOUS) 2 % solution swish and spit 15 mL every 4 hours as needed for pain.  100 mL 0    metFORMIN (GLUCOPHAGE) 850 mg tablet Take 1 Tab by mouth two (2) times daily (with meals). (Patient taking differently: Take 500 mg by mouth two (2) times daily (with meals). ) 60 Tab 0    carvedilol (COREG) 25 mg tablet Take 1 Tab by mouth two (2) times daily (with meals). 60 Tab 0    lisinopril (PRINIVIL, ZESTRIL) 40 mg tablet Take 40 mg by mouth daily.  cetirizine (ZYRTEC) 10 mg tablet Take 10 mg by mouth daily.  sertraline (ZOLOFT) 50 mg tablet Take 50 mg by mouth daily.  montelukast (SINGULAIR) 10 mg tablet Take 10 mg by mouth daily.  albuterol-ipratropium (DUO-NEB) 2.5 mg-0.5 mg/3 ml nebu 3 mL by Nebulization route every six (6) hours as needed.  albuterol (PROAIR HFA) 90 mcg/actuation inhaler Take 2 Puffs by inhalation every four (4) hours as needed.  [DISCONTINUED] omeprazole (PRILOSEC) 20 mg capsule Take 20 mg by mouth Daily (before breakfast). Past History     Past Medical History:  Past Medical History:   Diagnosis Date    Asthma     GERD (gastroesophageal reflux disease)     Hypertension        Past Surgical History:  Past Surgical History:   Procedure Laterality Date    HX GYN      ovarian cyst       Family History:  Family History   Problem Relation Age of Onset    Diabetes Mother     Asthma Father        Social History:  Social History     Tobacco Use    Smoking status: Current Every Day Smoker     Packs/day: 0.50    Smokeless tobacco: Never Used   Substance Use Topics    Alcohol use: Yes     Comment: socially    Drug use: No       Allergies: Allergies   Allergen Reactions    Amlodipine Other (comments)     Pt states it makes her BP higher    Percocet [Oxycodone-Acetaminophen] Itching     Denies this as an allergy.  Sulfa (Sulfonamide Antibiotics) Itching    Vicodin [Hydrocodone-Acetaminophen] Itching         Review of Systems   Review of Systems   Constitutional: Negative for activity change. HENT: Negative for congestion.     Eyes: Negative for visual disturbance. Respiratory: Positive for shortness of breath. Negative for chest tightness. Cardiovascular: Negative for chest pain. Musculoskeletal: Positive for neck pain. Negative for back pain, joint swelling, myalgias and neck stiffness. Neurological: Negative for dizziness, syncope and light-headedness. Hematological: Negative for adenopathy. All other systems reviewed and are negative. Physical Exam   Physical Exam   Vital signs and nursing notes reviewed    CONSTITUTIONAL: Alert, in moderate apparent distress; well-developed; well-nourished. HEAD:  Normocephalic, atraumatic  EYES: PERRL; EOM's intact. ENTM: Nose: no rhinorrhea; Throat: no erythema or exudate, mucous membranes moist, TMs are clear bilaterally, no mastoid tenderness. Neck:  Supple. trachea is midline. No lymphadenopathy. RESP: Chest clear, equal breath sounds. - W/R/R  CV: S1 and S2 WNL; No murmurs, gallops or rubs. 2+ radial and DP pulses bilaterally. GI: non-distended, normal bowel sounds, abdomen soft and non-tender. No masses or organomegaly. : No costo-vertebral angle tenderness. BACK:  Non-tender, normal appearance  UPPER EXT:  Normal inspection. no joint or soft tissue swelling  LOWER EXT: No edema, no calf tenderness. NEURO: Alert and oriented x3, 5/5 strength and light touch sensation intact in bilateral upper and lower extremities. SKIN: No rashes; Warm and dry  PSYCH: Normal mood, normal affect    Diagnostic Study Results     Labs -   No results found for this or any previous visit (from the past 12 hour(s)). Radiologic Studies -   CT SPINE CERV WO CONT   Final Result   IMPRESSION: No acute fracture or subluxation. CT Results  (Last 48 hours)               05/12/20 0936  CT SPINE CERV WO CONT Final result    Impression:  IMPRESSION: No acute fracture or subluxation.            Narrative:  INDICATION: R and post neck pain on laying down in bed        Exam: Noncontrast CT of the cervical spine is performed with 2.5 mm collimation. Sagittal and coronal reformatted images were also performed. CT dose reduction was achieved through use of a standardized protocol tailored   for this examination and automatic exposure control for dose modulation. FINDINGS: There is no acute fracture or subluxation. The prevertebral soft   tissues are within normal limits. Bones are well mineralized. Remaining   visualized soft tissues are normal. There is no evidence of canal stenosis or   neural foraminal narrowing. There is a C5-C6 anterior osteophyte. CXR Results  (Last 48 hours)    None          Medical Decision Making   I am the first provider for this patient. I reviewed the vital signs, available nursing notes, past medical history, past surgical history, family history and social history. Vital Signs-Reviewed the patient's vital signs. Patient Vitals for the past 12 hrs:   Temp Pulse Resp BP SpO2   05/12/20 0959 -- -- -- (!) 174/97 --   05/12/20 0833 97.8 °F (36.6 °C) 73 15 (!) 176/103 100 %           Records Reviewed: Nursing Notes and Old Medical Records    Provider Notes (Medical Decision Making):   66-year-old female with acute onset neck strain of muscles likely tendon, consider ligamentous injury. Will provide patient soft collar for comfort, muscle relaxer and NSAID directions for home. Do not have concerns for a spinal cord compromise and today's evaluation is not consistent with meningitis or nuchal rigidity or distal cord compromise. ED Course:   Initial assessment performed. The patients presenting problems have been discussed, and they are in agreement with the care plan formulated and outlined with them. I have encouraged them to ask questions as they arise throughout their visit. Disposition:  Discharge    Discharge Note:  10:14 AM  The pt is ready for discharge.  The pt's signs, symptoms, diagnosis, and discharge instructions have been discussed and pt has conveyed their understanding. The pt is to follow up as recommended or return to ER should their symptoms worsen. Plan has been discussed and pt is in agreement. DISCHARGE PLAN:  1. Current Discharge Medication List      START taking these medications    Details   cyclobenzaprine (FLEXERIL) 10 mg tablet Take 1 Tab by mouth three (3) times daily as needed for Muscle Spasm(s) for up to 10 doses. Indications: muscle spasm  Qty: 10 Tab, Refills: 0           2. Follow-up Information     Follow up With Specialties Details Why Contact Info    Rex Sosa NP Nurse Practitioner  Please follow-up with your primary care doctor in 1 week for reevaluation of your neck pain. [address] [address]  [phone]         These take ibuprofen 600 mg every 6 hours as well as Tylenol 1000 mg every 6 hours as needed for pain and inflammation. A prescription for a muscle relaxer was provided for you. You may also apply heat or ice for comfort. 3.  Return to ED if worse     Diagnosis     Clinical Impression:   1. Acute strain of neck muscle, initial encounter        Attestations:    Taylor Betts MD    Please note that this dictation was completed with sifonr, the [employer] voice recognition software. Quite often unanticipated grammatical, syntax, homophones, and other interpretive errors are inadvertently transcribed by the computer software. Please disregard these errors. Please excuse any errors that have escaped final proofreading. Thank you.

## 2020-05-12 NOTE — ED TRIAGE NOTES
Patient arrives to the emergency department via EMS with a chief complaint of neck pain mostly on the R side. Pt reports going to get into bed this morning and feeling a \"pop\" in her neck and since has had 10/10 pain. Denies any fall during this. EMS reports BG was 131. Pt arrives in c-collar placed by EMS.

## 2020-05-12 NOTE — ED NOTES
Pt reports that pain is much better when she is just resting but does have some pain with neck movement. MD notified.

## 2020-09-08 ENCOUNTER — HOSPITAL ENCOUNTER (EMERGENCY)
Age: 40
Discharge: HOME | End: 2020-09-08
Attending: EMERGENCY MEDICINE | Admitting: EMERGENCY MEDICINE
Payer: COMMERCIAL

## 2020-09-08 ENCOUNTER — APPOINTMENT (OUTPATIENT)
Dept: ULTRASOUND IMAGING | Age: 40
End: 2020-09-08
Attending: EMERGENCY MEDICINE
Payer: COMMERCIAL

## 2020-09-08 VITALS
HEART RATE: 81 BPM | BODY MASS INDEX: 37.75 KG/M2 | TEMPERATURE: 98.5 F | OXYGEN SATURATION: 100 % | SYSTOLIC BLOOD PRESSURE: 139 MMHG | RESPIRATION RATE: 21 BRPM | HEIGHT: 61 IN | WEIGHT: 199.96 LBS | DIASTOLIC BLOOD PRESSURE: 83 MMHG

## 2020-09-08 DIAGNOSIS — I15.9 SECONDARY HYPERTENSION: ICD-10-CM

## 2020-09-08 DIAGNOSIS — O20.0 THREATENED MISCARRIAGE IN EARLY PREGNANCY: Primary | ICD-10-CM

## 2020-09-08 LAB
ALBUMIN SERPL-MCNC: 3.1 G/DL (ref 3.5–5)
ALBUMIN/GLOB SERPL: 0.8 {RATIO} (ref 1.1–2.2)
ALP SERPL-CCNC: 76 U/L (ref 45–117)
ALT SERPL-CCNC: 22 U/L (ref 12–78)
ANION GAP SERPL CALC-SCNC: 8 MMOL/L (ref 5–15)
APPEARANCE UR: CLEAR
AST SERPL-CCNC: 33 U/L (ref 15–37)
BACTERIA URNS QL MICRO: NEGATIVE /HPF
BASOPHILS # BLD: 0 K/UL (ref 0–0.1)
BASOPHILS NFR BLD: 0 % (ref 0–1)
BILIRUB SERPL-MCNC: 0.3 MG/DL (ref 0.2–1)
BILIRUB UR QL: NEGATIVE
BUN SERPL-MCNC: 6 MG/DL (ref 6–20)
BUN/CREAT SERPL: 7 (ref 12–20)
CALCIUM SERPL-MCNC: 8.9 MG/DL (ref 8.5–10.1)
CHLORIDE SERPL-SCNC: 105 MMOL/L (ref 97–108)
CO2 SERPL-SCNC: 22 MMOL/L (ref 21–32)
COLOR UR: ABNORMAL
CREAT SERPL-MCNC: 0.82 MG/DL (ref 0.55–1.02)
DIFFERENTIAL METHOD BLD: ABNORMAL
EOSINOPHIL # BLD: 0.1 K/UL (ref 0–0.4)
EOSINOPHIL NFR BLD: 2 % (ref 0–7)
EPITH CASTS URNS QL MICRO: ABNORMAL /LPF
ERYTHROCYTE [DISTWIDTH] IN BLOOD BY AUTOMATED COUNT: 20.2 % (ref 11.5–14.5)
GLOBULIN SER CALC-MCNC: 4 G/DL (ref 2–4)
GLUCOSE SERPL-MCNC: 170 MG/DL (ref 65–100)
GLUCOSE UR STRIP.AUTO-MCNC: NEGATIVE MG/DL
HCG SERPL-ACNC: 9359 MIU/ML (ref 0–6)
HCT VFR BLD AUTO: 28.9 % (ref 35–47)
HGB BLD-MCNC: 8.4 G/DL (ref 11.5–16)
HGB UR QL STRIP: ABNORMAL
IMM GRANULOCYTES # BLD AUTO: 0 K/UL (ref 0–0.04)
IMM GRANULOCYTES NFR BLD AUTO: 0 % (ref 0–0.5)
KETONES UR QL STRIP.AUTO: ABNORMAL MG/DL
LEUKOCYTE ESTERASE UR QL STRIP.AUTO: NEGATIVE
LYMPHOCYTES # BLD: 1.2 K/UL (ref 0.8–3.5)
LYMPHOCYTES NFR BLD: 17 % (ref 12–49)
MCH RBC QN AUTO: 22.5 PG (ref 26–34)
MCHC RBC AUTO-ENTMCNC: 29.1 G/DL (ref 30–36.5)
MCV RBC AUTO: 77.5 FL (ref 80–99)
MONOCYTES # BLD: 0.5 K/UL (ref 0–1)
MONOCYTES NFR BLD: 7 % (ref 5–13)
MUCOUS THREADS URNS QL MICRO: ABNORMAL /LPF
NEUTS SEG # BLD: 5.3 K/UL (ref 1.8–8)
NEUTS SEG NFR BLD: 74 % (ref 32–75)
NITRITE UR QL STRIP.AUTO: NEGATIVE
NRBC # BLD: 0 K/UL (ref 0–0.01)
NRBC BLD-RTO: 0 PER 100 WBC
PH UR STRIP: 5 [PH] (ref 5–8)
PLATELET # BLD AUTO: 315 K/UL (ref 150–400)
PLATELET COMMENTS,PCOM: ABNORMAL
PMV BLD AUTO: 9.8 FL (ref 8.9–12.9)
POTASSIUM SERPL-SCNC: 3.9 MMOL/L (ref 3.5–5.1)
PROT SERPL-MCNC: 7.1 G/DL (ref 6.4–8.2)
PROT UR STRIP-MCNC: NEGATIVE MG/DL
RBC # BLD AUTO: 3.73 M/UL (ref 3.8–5.2)
RBC #/AREA URNS HPF: ABNORMAL /HPF (ref 0–5)
RBC MORPH BLD: ABNORMAL
SODIUM SERPL-SCNC: 135 MMOL/L (ref 136–145)
SP GR UR REFRACTOMETRY: 1.03 (ref 1–1.03)
UR CULT HOLD, URHOLD: NORMAL
UROBILINOGEN UR QL STRIP.AUTO: 0.2 EU/DL (ref 0.2–1)
WBC # BLD AUTO: 7.1 K/UL (ref 3.6–11)
WBC URNS QL MICRO: ABNORMAL /HPF (ref 0–4)

## 2020-09-08 PROCEDURE — 84702 CHORIONIC GONADOTROPIN TEST: CPT

## 2020-09-08 PROCEDURE — 36415 COLL VENOUS BLD VENIPUNCTURE: CPT

## 2020-09-08 PROCEDURE — 80053 COMPREHEN METABOLIC PANEL: CPT

## 2020-09-08 PROCEDURE — 76817 TRANSVAGINAL US OBSTETRIC: CPT

## 2020-09-08 PROCEDURE — 85025 COMPLETE CBC W/AUTO DIFF WBC: CPT

## 2020-09-08 PROCEDURE — 81001 URINALYSIS AUTO W/SCOPE: CPT

## 2020-09-08 PROCEDURE — 99284 EMERGENCY DEPT VISIT MOD MDM: CPT

## 2020-09-08 PROCEDURE — 76801 OB US < 14 WKS SINGLE FETUS: CPT

## 2020-09-08 RX ORDER — LABETALOL 100 MG/1
300 TABLET, FILM COATED ORAL EVERY 12 HOURS
Status: DISCONTINUED | OUTPATIENT
Start: 2020-09-08 | End: 2020-09-08

## 2020-09-08 RX ORDER — LABETALOL 300 MG/1
300 TABLET, FILM COATED ORAL 2 TIMES DAILY
Qty: 14 TAB | Refills: 0 | OUTPATIENT
Start: 2020-09-08 | End: 2020-09-08

## 2020-09-08 NOTE — ED NOTES
Assumed care of patient at change of shift. Blanca Gregory is back at 9000 but ultrasound does not show an IUP. She does not have bleeding other than some scant blood in her underwear.   I spoken to the GYN hospitalist who will come down and evaluate the patient for possible ectopic

## 2020-09-08 NOTE — ED NOTES
Patient has been seen by Dr. Contreras [facility] hospitalist.  He does not think this is an ectopic but an early IUP. Patient is already on labetalol he had wanted to put her on labetalol and take her off of carvedilol and lisinopril. Patient has an appointment with her OB/GYN tomorrow.   She will continue her labetalol 200 mg twice daily and follow-up tomorrow return precautions were provided

## 2020-09-08 NOTE — DISCHARGE INSTRUCTIONS
Patient Education      You have been evaluated by the OB/GYN this morning who feels you likely have an earlier pregnancy than you thought. Continue your Labetalol. Follow-up with your OB/GYN tomorrow as planned. If you develop worsening pain or significant bleeding where you are soaking more than a pad an hour for more than 3 hours return here. Threatened Miscarriage: Care Instructions  Overview     Some women have light spotting or bleeding during the first 12 weeks of pregnancy. In some cases this is normal. Light spotting or bleeding can also be a sign of a possible loss of the pregnancy. This is called a threatened miscarriage. At this point, the doctor may not be able to tell if your vaginal bleeding is normal or is a sign of a miscarriage. In early pregnancy, things such as stress, exercise, and sex do not cause miscarriage. You may be worried or upset about the possibility of losing your pregnancy. But do not blame yourself. There is no treatment to stop a miscarriage. If you do have a miscarriage, there was nothing you could have done to prevent it. A miscarriage usually means that the pregnancy is not developing normally. Follow-up care is a key part of your treatment and safety. Be sure to make and go to all appointments, and call your doctor if you are having problems. It's also a good idea to know your test results and keep a list of the medicines you take. How can you care for yourself at home? · Take acetaminophen (Tylenol) for cramps. Read and follow all instructions on the label. · Do not take two or more pain medicines at the same time unless the doctor told you to. Many pain medicines have acetaminophen, which is Tylenol. Too much acetaminophen (Tylenol) can be harmful. · Do not have sex until your doctor says it is okay. · Get lots of rest over the next several days. · You may do your normal activities if you feel well enough to do them.  But do not do any heavy exercise until your doctor says it is okay. · Eat a balanced diet that is high in iron and vitamin C. Foods rich in iron include red meat, shellfish, eggs, beans, and leafy green vegetables. Foods high in vitamin C include citrus fruits, tomatoes, and broccoli. Talk to your doctor about whether you need to take iron pills or a multivitamin. · Do not drink alcohol or use tobacco or illegal drugs. · Do not smoke. If you need help quitting, talk to your doctor about stop-smoking programs and medicines. These can increase your chances of quitting for good. When should you call for help? Call 911 anytime you think you may need emergency care. For example, call if:    · You passed out (lost consciousness). Call your doctor now or seek immediate medical care if:    · You have severe vaginal bleeding.     · You are dizzy or lightheaded, or you feel like you may faint.     · You have new or worse pain in your belly or pelvis.     · You have a fever.     · You have vaginal discharge that smells bad. Watch closely for changes in your health, and be sure to contact your doctor if:    · You do not get better as expected. Where can you learn more? Go to http://rosemary-marisela.info/  Enter [id number] in the search box to learn more about \"Threatened Miscarriage: Care Instructions. \"  Current as of: February 11, 2020               Content Version: 12.6  © 1605-1994 [employer]. Care instructions adapted under license by [employer] (which disclaims liability or warranty for this information). If you have questions about a medical condition or this instruction, always ask your healthcare professional. [place] [address] any warranty or liability for your use of this information. Patient Education        High Blood Pressure: Care Instructions  Overview     It's normal for blood pressure to go up and down throughout the day. But if it stays up, you have high blood pressure.  Another name for high blood pressure is hypertension. Despite what a lot of people think, high blood pressure usually doesn't cause headaches or make you feel dizzy or lightheaded. It usually has no symptoms. But it does increase your risk of stroke, heart attack, and other problems. You and your doctor will talk about your risks of these problems based on your blood pressure. Your doctor will give you a goal for your blood pressure. Your goal will be based on your health and your age. Lifestyle changes, such as eating healthy and being active, are always important to help lower blood pressure. You might also take medicine to reach your blood pressure goal.  Follow-up care is a key part of your treatment and safety. Be sure to make and go to all appointments, and call your doctor if you are having problems. It's also a good idea to know your test results and keep a list of the medicines you take. How can you care for yourself at home? Medical treatment  · If you stop taking your medicine, your blood pressure will go back up. You may take one or more types of medicine to lower your blood pressure. Be safe with medicines. Take your medicine exactly as prescribed. Call your doctor if you think you are having a problem with your medicine. · Talk to your doctor before you start taking aspirin every day. Aspirin can help certain people lower their risk of a heart attack or stroke. But taking aspirin isn't right for everyone, because it can cause serious bleeding. · See your doctor regularly. You may need to see the doctor more often at first or until your blood pressure comes down. · If you are taking blood pressure medicine, talk to your doctor before you take decongestants or anti-inflammatory medicine, such as ibuprofen. Some of these medicines can raise blood pressure. · Learn how to check your blood pressure at home. Lifestyle changes  · Stay at a healthy weight.  This is especially important if you put on weight around the waist. Losing even 10 pounds can help you lower your blood pressure. · If your doctor recommends it, get more exercise. Walking is a good choice. Bit by bit, increase the amount you walk every day. Try for at least 30 minutes on most days of the week. You also may want to swim, bike, or do other activities. · Avoid or limit alcohol. Talk to your doctor about whether you can drink any alcohol. · Try to limit how much sodium you eat to less than 2,300 milligrams (mg) a day. Your doctor may ask you to try to eat less than 1,500 mg a day. · Eat plenty of fruits (such as bananas and oranges), vegetables, legumes, whole grains, and low-fat dairy products. · Lower the amount of saturated fat in your diet. Saturated fat is found in animal products such as milk, cheese, and meat. Limiting these foods may help you lose weight and also lower your risk for heart disease. · Do not smoke. Smoking increases your risk for heart attack and stroke. If you need help quitting, talk to your doctor about stop-smoking programs and medicines. These can increase your chances of quitting for good. When should you call for help? Call  911 anytime you think you may need emergency care. This may mean having symptoms that suggest that your blood pressure is causing a serious heart or blood vessel problem. Your blood pressure may be over 180/120. For example, call 911 if:    · You have symptoms of a heart attack. These may include:  ? Chest pain or pressure, or a strange feeling in the chest.  ? Sweating. ? Shortness of breath. ? Nausea or vomiting. ? Pain, pressure, or a strange feeling in the back, neck, jaw, or upper belly or in one or both shoulders or arms. ? Lightheadedness or sudden weakness. ? A fast or irregular heartbeat.     · You have symptoms of a stroke.  These may include:  ? Sudden numbness, tingling, weakness, or loss of movement in your face, arm, or leg, especially on only one side of your body.  ? Sudden vision changes. ? Sudden trouble speaking. ? Sudden confusion or trouble understanding simple statements. ? Sudden problems with walking or balance. ? A sudden, severe headache that is different from past headaches.     · You have severe back or belly pain. Do not wait until your blood pressure comes down on its own. Get help right away. Call your doctor now or seek immediate care if:    · Your blood pressure is much higher than normal (such as 180/120 or higher), but you don't have symptoms.     · You think high blood pressure is causing symptoms, such as:  ? Severe headache.  ? Blurry vision. Watch closely for changes in your health, and be sure to contact your doctor if:    · Your blood pressure measures higher than your doctor recommends at least 2 times. That means the top number is higher or the bottom number is higher, or both.     · You think you may be having side effects from your blood pressure medicine. Where can you learn more? Go to http://rosemary-marisela.info/  Enter [id number] in the search box to learn more about \"High Blood Pressure: Care Instructions. \"  Current as of: December 16, 2019               Content Version: 12.6  © 8732-0962 [employer]. Care instructions adapted under license by [employer] (which disclaims liability or warranty for this information). If you have questions about a medical condition or this instruction, always ask your healthcare professional. [address] any warranty or liability for your use of this information.

## 2020-09-08 NOTE — ED TRIAGE NOTES
Arrives with c/o of spotting and generalized pelvic cramping that began about 3 hrs ago. aproximately 8 weeks pregnant. First pregnancy    Has appt with OBGYN on wed.

## 2020-09-08 NOTE — CONSULTS
[facility] Consult    Name: Sigrid Skiff MRN: [id number]  SSN: [id number]    YOB: 1980  Age: 44 y.o. Sex: female      Subjective:     Reason for Admission:  Pregnancy and Spotting    History of Present Illness: Sigrid Skiff is a 44 y.o.  female with an estimated gestational age of Unknown with Estimated Date of Delivery: None noted. . Patient complains of mild vaginal bleeding for 1 days. Pregnancy has been complicated by advanced maternal age, chronic hypertension, diabetes - Type 2 and vaginal bleeding. Patient denies chest pain, fever, headache , nausea and vomiting, pelvic pressure, right upper quadrant pain  , shortness of breath, swelling, vaginal leaking of fluid  and visual disturbances. OB History    No obstetric history on file. Past Medical History:   Diagnosis Date    Asthma     GERD (gastroesophageal reflux disease)     Hypertension      Past Surgical History:   Procedure Laterality Date    HX GYN      ovarian cyst     Social History     Occupational History    Not on file   Tobacco Use    Smoking status: Former Smoker     Packs/day: 0.50    Smokeless tobacco: Never Used   Substance and Sexual Activity    Alcohol use: Not Currently     Comment: socially    Drug use: No    Sexual activity: Not on file     Family History   Problem Relation Age of Onset    Diabetes Mother     Asthma Father        Allergies   Allergen Reactions    Amlodipine Other (comments)     Pt states it makes her BP higher    Percocet [Oxycodone-Acetaminophen] Itching     Denies this as an allergy.  Sulfa (Sulfonamide Antibiotics) Itching    Vicodin [Hydrocodone-Acetaminophen] Itching     Prior to Admission medications    Medication Sig Start Date End Date Taking? Authorizing Provider   labetaloL (NORMODYNE) 300 mg tablet Take 1 Tab by mouth two (2) times a day. 9/8/20  Yes Inna León MD   multivitamin (ONE A DAY) tablet Take 1 Tab by mouth daily.     Marta Mahan MD B.infantis-B.ani-B.long-B.bifi (Probiotic 4X) 10-15 mg TbEC Take  by mouth daily. Marta Mahan MD   acetaminophen (Tylenol Extra Strength) 500 mg tablet Take  by mouth every six (6) hours as needed for Pain. Other, MD Marta   pantoprazole (Protonix) 20 mg tablet Take 20 mg by mouth daily. Kalli, MD Marta   cyclobenzaprine (FLEXERIL) 10 mg tablet Take 1 Tab by mouth three (3) times daily as needed for Muscle Spasm(s) for up to 10 doses. Indications: muscle spasm 20   Ryan Abdi MD   metFORMIN (GLUCOPHAGE) 850 mg tablet Take 1 Tab by mouth two (2) times daily (with meals). Patient taking differently: Take 500 mg by mouth two (2) times daily (with meals). 7/3/19   Dorothy Gilmore MD   cetirizine (ZYRTEC) 10 mg tablet Take 10 mg by mouth daily. Provider, Historical   sertraline (ZOLOFT) 50 mg tablet Take 50 mg by mouth daily. Provider, Historical   montelukast (SINGULAIR) 10 mg tablet Take 10 mg by mouth daily. Provider, Historical   albuterol-ipratropium (DUO-NEB) 2.5 mg-0.5 mg/3 ml nebu 3 mL by Nebulization route every six (6) hours as needed. Provider, Historical   albuterol (PROAIR HFA) 90 mcg/actuation inhaler Take 2 Puffs by inhalation every four (4) hours as needed. Provider, Historical        Review of Systems    Objective:     Vitals:    Vitals:    20 0151 20 0545 20 0600 20 0645   BP: (!) 147/96 (!) 155/93 (!) 154/97 (!) 178/106   Pulse: 94 77 76 82   Resp: 18 18  26   Temp: 97.8 °F (36.6 °C)      SpO2: 99% 99% 100% 100%   Weight: 90.7 kg (199 lb 15.3 oz)      Height: 5' 1\" (1.549 m)         Temp (24hrs), Av.8 °F (36.6 °C), Min:97.8 °F (36.6 °C), Max:97.8 °F (36.6 °C)    BP  Min: 147/96  Max: 178/106     Physical Exam    Cervical Exam: deferred  Uterine Activity: None  Membranes: Intact  Fetal Heart Rate: None        US:    INDICATION: Early pregnancy.  Vaginal bleeding.     FINDINGS:   Transpelvic ultrasound imaging was performed to assess for fetal viability. The  images demonstrate a decompressed urinary bladder. The uterus measures 10.8 x  6.1 x 6.9 cm. A single oval gestational sac is present within the endometrial  canal., Measuring 1.9 x 0.9 x 0.9 cm. No fetal pole is visualized by transpelvic  technique. The placenta  is not visualized due to early gestational age. No  myometrial mass is evident. The ovaries are obscured by overlying bowel gas.     Transvaginal ultrasound imaging was then performed for better definition of the  endometrial contents. There is a single 2.3 x 0.9 x 0.8 cm gestational sac  within the endometrial canal. This contains a tiny yolk sac, but no fetal pole  is visualized. The placenta  is not visualized due to early gestational age. The  right ovary measures 4.9 x 4.6 x 2.9 cm and contains 2 follicular cysts  measuring 2.5 cm and 2.4 cm. The left ovary measures 2.2 x 2.1 x 1.3 cm. No  adnexal mass or pelvic free fluid is evident.        IMPRESSION  IMPRESSION: Small gestational sac within the endometrial canal. Fetal pole is  not visualized, likely secondary to early gestational age. No adnexal mass or  pelvic free fluid. Follicular cysts in the right ovary are likely physiologic.     Labs:   Recent Results (from the past 24 hour(s))   CBC WITH AUTOMATED DIFF    Collection Time: 09/08/20  3:46 AM   Result Value Ref Range    WBC 7.1 3.6 - 11.0 K/uL    RBC 3.73 (L) 3.80 - 5.20 M/uL    HGB 8.4 (L) 11.5 - 16.0 g/dL    HCT 28.9 (L) 35.0 - 47.0 %    MCV 77.5 (L) 80.0 - 99.0 FL    MCH 22.5 (L) 26.0 - 34.0 PG    MCHC 29.1 (L) 30.0 - 36.5 g/dL    RDW 20.2 (H) 11.5 - 14.5 %    PLATELET 729 536 - 447 K/uL    MPV 9.8 8.9 - 12.9 FL    NRBC 0.0 0  WBC    ABSOLUTE NRBC 0.00 0.00 - 0.01 K/uL    NEUTROPHILS 74 32 - 75 %    LYMPHOCYTES 17 12 - 49 %    MONOCYTES 7 5 - 13 %    EOSINOPHILS 2 0 - 7 %    BASOPHILS 0 0 - 1 %    IMMATURE GRANULOCYTES 0 0.0 - 0.5 %    ABS. NEUTROPHILS 5.3 1.8 - 8.0 K/UL    ABS.  LYMPHOCYTES 1.2 0.8 - 3.5 K/UL    ABS. MONOCYTES 0.5 0.0 - 1.0 K/UL    ABS. EOSINOPHILS 0.1 0.0 - 0.4 K/UL    ABS. BASOPHILS 0.0 0.0 - 0.1 K/UL    ABS. IMM. GRANS. 0.0 0.00 - 0.04 K/UL    DF SMEAR SCANNED      PLATELET COMMENTS Large Platelets      RBC COMMENTS ANISOCYTOSIS  2+        RBC COMMENTS OVALOCYTES  1+        RBC COMMENTS POLYCHROMASIA  1+       METABOLIC PANEL, COMPREHENSIVE    Collection Time: 09/08/20  3:46 AM   Result Value Ref Range    Sodium 135 (L) 136 - 145 mmol/L    Potassium 3.9 3.5 - 5.1 mmol/L    Chloride 105 97 - 108 mmol/L    CO2 22 21 - 32 mmol/L    Anion gap 8 5 - 15 mmol/L    Glucose 170 (H) 65 - 100 mg/dL    BUN 6 6 - 20 MG/DL    Creatinine 0.82 0.55 - 1.02 MG/DL    BUN/Creatinine ratio 7 (L) 12 - 20      GFR est AA >60 >60 ml/min/1.73m2    GFR est non-AA >60 >60 ml/min/1.73m2    Calcium 8.9 8.5 - 10.1 MG/DL    Bilirubin, total 0.3 0.2 - 1.0 MG/DL    ALT (SGPT) 22 12 - 78 U/L    AST (SGOT) 33 15 - 37 U/L    Alk.  phosphatase 76 45 - 117 U/L    Protein, total 7.1 6.4 - 8.2 g/dL    Albumin 3.1 (L) 3.5 - 5.0 g/dL    Globulin 4.0 2.0 - 4.0 g/dL    A-G Ratio 0.8 (L) 1.1 - 2.2     BETA HCG, QT    Collection Time: 09/08/20  3:46 AM   Result Value Ref Range    Beta HCG, QT 9,359 (H) 0 - 6 MIU/ML   URINALYSIS W/MICROSCOPIC    Collection Time: 09/08/20  5:18 AM   Result Value Ref Range    Color YELLOW/STRAW      Appearance CLEAR CLEAR      Specific gravity 1.027 1.003 - 1.030      pH (UA) 5.0 5.0 - 8.0      Protein Negative NEG mg/dL    Glucose Negative NEG mg/dL    Ketone TRACE (A) NEG mg/dL    Bilirubin Negative NEG      Blood TRACE (A) NEG      Urobilinogen 0.2 0.2 - 1.0 EU/dL    Nitrites Negative NEG      Leukocyte Esterase Negative NEG      WBC 0-4 0 - 4 /hpf    RBC 5-10 0 - 5 /hpf    Epithelial cells FEW FEW /lpf    Bacteria Negative NEG /hpf    Mucus TRACE (A) NEG /lpf   URINE CULTURE HOLD SAMPLE    Collection Time: 09/08/20  5:18 AM    Specimen: Serum; Urine   Result Value Ref Range    Urine culture hold        Urine on hold in Microbiology dept for 2 days. If unpreserved urine is submitted, it cannot be used for addtional testing after 24 hours, recollection will be required. Patient Active Problem List   Diagnosis Code    Hypertensive urgency I16.0     Assessment and Plan:     1.  @ approximately 5wga by [address]. And Unknown LMP. W/ Vaginal Spotting and cramping concerning for Threatened  . Us Confirms IUP w/ GS C/W 5wga and YS, however no confirmation of fetal pole or FCM yet. - she will need and has already scheduled appt with [facility] tomorrow. 2.HTN  Chronic- increase labetalol to 300 BID. In pregnancy- will need baseline PIH labs prenatally  And High risk , M consultation for Prenatal care. 3. Pre gestational DM- continue Metformin  4. Maternal Obesity- Prenatal care Dietary consult may be needed.       Signed By:  Brook Weathers MD     2020                 cesar

## 2020-09-08 NOTE — ED PROVIDER NOTES
Patient is a 70-year-old female with past medical history of hypertension and non-insulin-dependent diabetes mellitus presenting emergency department for evaluation of pelvic pain and vaginal spotting. Patient is currently 8 weeks pregnant, this is her first pregnancy. Her spotting began 4 hours ago, and cramping began about 3 hours prior to arrival.  She describes bleeding as spotting, she was seen visible bleeding on her underwear but has not had to use any pads. She has not had an ultrasound yet, she has her first OB/GYN appointment scheduled for 9/9/2020 with [facility]. She denies fever, chills, sweats, chest pain, nausea, vomiting, diarrhea, dysuria, hematuria, leg swelling, or any other complaints at this time.            Past Medical History:   Diagnosis Date    Asthma     GERD (gastroesophageal reflux disease)     Hypertension        Past Surgical History:   Procedure Laterality Date    HX GYN      ovarian cyst         Family History:   Problem Relation Age of Onset    Diabetes Mother     Asthma Father        Social History     Socioeconomic History    Marital status:      Spouse name: Not on file    Number of children: Not on file    Years of education: Not on file    Highest education level: Not on file   Occupational History    Not on file   Social Needs    Financial resource strain: Not on file    Food insecurity     Worry: Not on file     Inability: Not on file    Transportation needs     Medical: Not on file     Non-medical: Not on file   Tobacco Use    Smoking status: Former Smoker     Packs/day: 0.50    Smokeless tobacco: Never Used   Substance and Sexual Activity    Alcohol use: Not Currently     Comment: socially    Drug use: No    Sexual activity: Not on file   Lifestyle    Physical activity     Days per week: Not on file     Minutes per session: Not on file    Stress: Not on file   Relationships    Social connections     Talks on phone: Not on file     Gets together: Not on file     Attends [employer] service: Not on file     Active member of club or organization: Not on file     Attends meetings of clubs or organizations: Not on file     Relationship status: Not on file    Intimate partner violence     Fear of current or ex partner: Not on file     Emotionally abused: Not on file     Physically abused: Not on file     Forced sexual activity: Not on file   Other Topics Concern     Service Not Asked    Blood Transfusions Not Asked    Caffeine Concern Not Asked    Occupational Exposure Not Asked   Griselda Malagon Hazards Not Asked    Sleep Concern Not Asked    Stress Concern Not Asked    Weight Concern Not Asked    Special Diet Not Asked    Back Care Not Asked    Exercise Not Asked    Bike Helmet Not Asked   [address] Not Asked    Self-Exams Not Asked   Social History Narrative    Not on file         ALLERGIES: Amlodipine; Percocet [oxycodone-acetaminophen]; Sulfa (sulfonamide antibiotics); and Vicodin [hydrocodone-acetaminophen]    Review of Systems   Constitutional: Negative for chills and fever. HENT: Negative for ear pain and sore throat. Eyes: Negative for visual disturbance. Respiratory: Negative for cough and shortness of breath. Cardiovascular: Negative for chest pain. Gastrointestinal: Negative for abdominal pain. Genitourinary: Positive for menstrual problem and pelvic pain. Negative for dysuria, flank pain and hematuria. Musculoskeletal: Negative for back pain. Skin: Negative for color change. Neurological: Negative for dizziness and headaches. Psychiatric/Behavioral: Negative for confusion. Vitals:    09/08/20 0151   BP: (!) 147/96   Pulse: 94   Resp: 18   Temp: 97.8 °F (36.6 °C)   SpO2: 99%   Weight: 90.7 kg (199 lb 15.3 oz)   Height: 5' 1\" (1.549 m)            Physical Exam  Vitals signs and nursing note reviewed. Constitutional:       General: She is not in acute distress.      Appearance: Normal appearance. She is not ill-appearing. HENT:      Head: Normocephalic and atraumatic. Mouth/Throat:      Pharynx: Oropharynx is clear. Eyes:      Extraocular Movements: Extraocular movements intact. Conjunctiva/sclera: Conjunctivae normal.   Neck:      Musculoskeletal: No neck rigidity. Cardiovascular:      Rate and Rhythm: Normal rate and regular rhythm. Pulmonary:      Effort: Pulmonary effort is normal.      Breath sounds: Normal breath sounds. Abdominal:      Palpations: Abdomen is soft. Tenderness: There is abdominal tenderness in the suprapubic area. There is no right CVA tenderness, left CVA tenderness, guarding or rebound. Comments: Mild tenderness to palpation to lower abdomen, no guarding or rebound tenderness. Musculoskeletal: Normal range of motion. Right lower leg: No edema. Left lower leg: No edema. Skin:     General: Skin is warm and dry. Neurological:      General: No focal deficit present. Mental Status: She is alert and oriented to person, place, and time. Psychiatric:         Mood and Affect: Mood normal.          MDM  Number of Diagnoses or Management Options  Diagnosis management comments: Patient is alert. Appears nervous, afebrile, blood pressure mildly elevated, later vitals are stable. She is estimated to be 8 weeks pregnant, . She has not confirmed IUP yet. Has an upcoming OB/GYN appointment 2020. Comments of lower abdominal pelvic pain and vaginal spotting for the past 4 hours. Abdomen is soft, nondistended, with mild tenderness without guarding to lower abdomen. Pain is equal bilaterally. Plan to obtain CBC, CMP, beta-hCG, urinalysis, transvaginal ultrasound, monitor, reassess    2:39 AM  Change of shift. Care of patient signed over to ED attending Dr. Sneed.  Awaiting lab work, ultrasound, urinalysis, pending disposition           Amount and/or Complexity of Data Reviewed  Clinical lab tests: reviewed  Tests in the radiology section of CPT®: reviewed  Tests in the medicine section of CPT®: reviewed  Discuss the patient with other providers: yes (ED attending Dr. Zunilda Mckeon )           Procedures

## 2020-09-12 ENCOUNTER — HOSPITAL ENCOUNTER (EMERGENCY)
Age: 40
Discharge: HOME | End: 2020-09-12
Attending: STUDENT IN AN ORGANIZED HEALTH CARE EDUCATION/TRAINING PROGRAM
Payer: COMMERCIAL

## 2020-09-12 ENCOUNTER — APPOINTMENT (OUTPATIENT)
Dept: ULTRASOUND IMAGING | Age: 40
End: 2020-09-12
Attending: STUDENT IN AN ORGANIZED HEALTH CARE EDUCATION/TRAINING PROGRAM
Payer: COMMERCIAL

## 2020-09-12 VITALS
BODY MASS INDEX: 37.76 KG/M2 | DIASTOLIC BLOOD PRESSURE: 79 MMHG | SYSTOLIC BLOOD PRESSURE: 125 MMHG | TEMPERATURE: 98 F | OXYGEN SATURATION: 96 % | HEIGHT: 61 IN | HEART RATE: 80 BPM | RESPIRATION RATE: 16 BRPM | WEIGHT: 200 LBS

## 2020-09-12 DIAGNOSIS — O03.9 SPONTANEOUS ABORTION: Primary | ICD-10-CM

## 2020-09-12 LAB
ABO + RH BLD: NORMAL
ALBUMIN SERPL-MCNC: 3.4 G/DL (ref 3.5–5)
ALBUMIN/GLOB SERPL: 0.8 {RATIO} (ref 1.1–2.2)
ALP SERPL-CCNC: 69 U/L (ref 45–117)
ALT SERPL-CCNC: 20 U/L (ref 12–78)
ANION GAP SERPL CALC-SCNC: 9 MMOL/L (ref 5–15)
APPEARANCE UR: ABNORMAL
AST SERPL-CCNC: 16 U/L (ref 15–37)
BACTERIA URNS QL MICRO: NEGATIVE /HPF
BASOPHILS # BLD: 0 K/UL (ref 0–0.1)
BASOPHILS NFR BLD: 0 % (ref 0–1)
BILIRUB SERPL-MCNC: 0.5 MG/DL (ref 0.2–1)
BILIRUB UR QL: NEGATIVE
BLD PROD TYP BPU: NORMAL
BLOOD BANK CMNT PATIENT-IMP: NORMAL
BPU ID: NORMAL
BUN SERPL-MCNC: 6 MG/DL (ref 6–20)
BUN/CREAT SERPL: 8 (ref 12–20)
CALCIUM SERPL-MCNC: 9 MG/DL (ref 8.5–10.1)
CHLORIDE SERPL-SCNC: 103 MMOL/L (ref 97–108)
CLUE CELLS VAG QL WET PREP: NORMAL
CO2 SERPL-SCNC: 24 MMOL/L (ref 21–32)
COLOR UR: ABNORMAL
COMMENT, HOLDF: NORMAL
CREAT SERPL-MCNC: 0.79 MG/DL (ref 0.55–1.02)
DIFFERENTIAL METHOD BLD: ABNORMAL
EOSINOPHIL # BLD: 0 K/UL (ref 0–0.4)
EOSINOPHIL NFR BLD: 0 % (ref 0–7)
EPITH CASTS URNS QL MICRO: ABNORMAL /LPF
ERYTHROCYTE [DISTWIDTH] IN BLOOD BY AUTOMATED COUNT: 19.7 % (ref 11.5–14.5)
GA (WEEKS): 8 WK
GLOBULIN SER CALC-MCNC: 4.1 G/DL (ref 2–4)
GLUCOSE SERPL-MCNC: 196 MG/DL (ref 65–100)
GLUCOSE UR STRIP.AUTO-MCNC: 100 MG/DL
HCG SERPL-ACNC: 5237 MIU/ML (ref 0–6)
HCT VFR BLD AUTO: 29.5 % (ref 35–47)
HGB BLD-MCNC: 8.7 G/DL (ref 11.5–16)
HGB UR QL STRIP: ABNORMAL
IMM GRANULOCYTES # BLD AUTO: 0 K/UL (ref 0–0.04)
IMM GRANULOCYTES NFR BLD AUTO: 0 % (ref 0–0.5)
INR PPP: 1 (ref 0.9–1.1)
KETONES UR QL STRIP.AUTO: NEGATIVE MG/DL
KOH PREP SPEC: NORMAL
LEUKOCYTE ESTERASE UR QL STRIP.AUTO: NEGATIVE
LYMPHOCYTES # BLD: 0.8 K/UL (ref 0.8–3.5)
LYMPHOCYTES NFR BLD: 11 % (ref 12–49)
MCH RBC QN AUTO: 22.4 PG (ref 26–34)
MCHC RBC AUTO-ENTMCNC: 29.5 G/DL (ref 30–36.5)
MCV RBC AUTO: 76 FL (ref 80–99)
MONOCYTES # BLD: 0.5 K/UL (ref 0–1)
MONOCYTES NFR BLD: 6 % (ref 5–13)
NEUTS SEG # BLD: 6.3 K/UL (ref 1.8–8)
NEUTS SEG NFR BLD: 83 % (ref 32–75)
NITRITE UR QL STRIP.AUTO: NEGATIVE
NRBC # BLD: 0 K/UL (ref 0–0.01)
NRBC BLD-RTO: 0 PER 100 WBC
PH UR STRIP: 8 [PH] (ref 5–8)
PLATELET # BLD AUTO: 281 K/UL (ref 150–400)
PMV BLD AUTO: 9.3 FL (ref 8.9–12.9)
POTASSIUM SERPL-SCNC: 3.5 MMOL/L (ref 3.5–5.1)
PROT SERPL-MCNC: 7.5 G/DL (ref 6.4–8.2)
PROT UR STRIP-MCNC: 100 MG/DL
PROTHROMBIN TIME: 10.4 SEC (ref 9–11.1)
RBC # BLD AUTO: 3.88 M/UL (ref 3.8–5.2)
RBC #/AREA URNS HPF: >100 /HPF (ref 0–5)
SAMPLES BEING HELD,HOLD: NORMAL
SERVICE CMNT-IMP: NORMAL
SODIUM SERPL-SCNC: 136 MMOL/L (ref 136–145)
SP GR UR REFRACTOMETRY: 1.02 (ref 1–1.03)
STATUS OF UNIT,%ST: NORMAL
T VAGINALIS VAG QL WET PREP: NORMAL
UNIT DIVISION, %UDIV: 0
UR CULT HOLD, URHOLD: NORMAL
UROBILINOGEN UR QL STRIP.AUTO: 0.2 EU/DL (ref 0.2–1)
WBC # BLD AUTO: 7.6 K/UL (ref 3.6–11)
WBC URNS QL MICRO: ABNORMAL /HPF (ref 0–4)

## 2020-09-12 PROCEDURE — 81001 URINALYSIS AUTO W/SCOPE: CPT

## 2020-09-12 PROCEDURE — 85025 COMPLETE CBC W/AUTO DIFF WBC: CPT

## 2020-09-12 PROCEDURE — 80053 COMPREHEN METABOLIC PANEL: CPT

## 2020-09-12 PROCEDURE — 84702 CHORIONIC GONADOTROPIN TEST: CPT

## 2020-09-12 PROCEDURE — 76801 OB US < 14 WKS SINGLE FETUS: CPT

## 2020-09-12 PROCEDURE — 36415 COLL VENOUS BLD VENIPUNCTURE: CPT

## 2020-09-12 PROCEDURE — 99285 EMERGENCY DEPT VISIT HI MDM: CPT

## 2020-09-12 PROCEDURE — 96374 THER/PROPH/DIAG INJ IV PUSH: CPT

## 2020-09-12 PROCEDURE — 74011250636 HC RX REV CODE- 250/636: Performed by: STUDENT IN AN ORGANIZED HEALTH CARE EDUCATION/TRAINING PROGRAM

## 2020-09-12 PROCEDURE — 87210 SMEAR WET MOUNT SALINE/INK: CPT

## 2020-09-12 PROCEDURE — 87491 CHLMYD TRACH DNA AMP PROBE: CPT

## 2020-09-12 PROCEDURE — 85610 PROTHROMBIN TIME: CPT

## 2020-09-12 PROCEDURE — 96375 TX/PRO/DX INJ NEW DRUG ADDON: CPT

## 2020-09-12 PROCEDURE — 86900 BLOOD TYPING SEROLOGIC ABO: CPT

## 2020-09-12 PROCEDURE — 96376 TX/PRO/DX INJ SAME DRUG ADON: CPT

## 2020-09-12 PROCEDURE — 76830 TRANSVAGINAL US NON-OB: CPT

## 2020-09-12 RX ORDER — OXYCODONE AND ACETAMINOPHEN 5; 325 MG/1; MG/1
1-2 TABLET ORAL
Qty: 20 TAB | Refills: 0 | Status: SHIPPED | OUTPATIENT
Start: 2020-09-12 | End: 2020-09-19

## 2020-09-12 RX ORDER — KETOROLAC TROMETHAMINE 30 MG/ML
30 INJECTION, SOLUTION INTRAMUSCULAR; INTRAVENOUS
Status: COMPLETED | OUTPATIENT
Start: 2020-09-12 | End: 2020-09-12

## 2020-09-12 RX ORDER — HYDROMORPHONE HYDROCHLORIDE 2 MG/ML
0.5 INJECTION, SOLUTION INTRAMUSCULAR; INTRAVENOUS; SUBCUTANEOUS
Status: DISCONTINUED | OUTPATIENT
Start: 2020-09-12 | End: 2020-09-12 | Stop reason: HOSPADM

## 2020-09-12 RX ORDER — ONDANSETRON 2 MG/ML
4 INJECTION INTRAMUSCULAR; INTRAVENOUS
Status: COMPLETED | OUTPATIENT
Start: 2020-09-12 | End: 2020-09-12

## 2020-09-12 RX ORDER — MORPHINE SULFATE 2 MG/ML
4 INJECTION, SOLUTION INTRAMUSCULAR; INTRAVENOUS
Status: COMPLETED | OUTPATIENT
Start: 2020-09-12 | End: 2020-09-12

## 2020-09-12 RX ORDER — MORPHINE SULFATE 2 MG/ML
4 INJECTION, SOLUTION INTRAMUSCULAR; INTRAVENOUS
Status: DISCONTINUED | OUTPATIENT
Start: 2020-09-12 | End: 2020-09-12 | Stop reason: HOSPADM

## 2020-09-12 RX ADMIN — MORPHINE SULFATE 4 MG: 2 INJECTION, SOLUTION INTRAMUSCULAR; INTRAVENOUS at 12:09

## 2020-09-12 RX ADMIN — ONDANSETRON 4 MG: 2 INJECTION INTRAMUSCULAR; INTRAVENOUS at 14:43

## 2020-09-12 RX ADMIN — SODIUM CHLORIDE 1000 ML: 9 INJECTION, SOLUTION INTRAVENOUS at 11:31

## 2020-09-12 RX ADMIN — MORPHINE SULFATE 4 MG: 2 INJECTION, SOLUTION INTRAMUSCULAR; INTRAVENOUS at 12:58

## 2020-09-12 RX ADMIN — ONDANSETRON 4 MG: 2 INJECTION INTRAMUSCULAR; INTRAVENOUS at 11:31

## 2020-09-12 RX ADMIN — HYDROMORPHONE HYDROCHLORIDE 0.5 MG: 2 INJECTION INTRAMUSCULAR; INTRAVENOUS; SUBCUTANEOUS at 14:43

## 2020-09-12 RX ADMIN — MORPHINE SULFATE 4 MG: 2 INJECTION, SOLUTION INTRAMUSCULAR; INTRAVENOUS at 11:31

## 2020-09-12 RX ADMIN — KETOROLAC TROMETHAMINE 30 MG: 30 INJECTION, SOLUTION INTRAMUSCULAR at 15:29

## 2020-09-12 NOTE — ED TRIAGE NOTES
Triage note: Pt arrives via EMS from home cc vaginal bleeding with clots and severe pelvic cramping since 0700 this morning. Pt dx with miscarriage 2 days ago by her doctor. This is her first pregnancy.  Pt in distress on arrival.

## 2020-09-12 NOTE — ED NOTES
Nursing tells me that [facility] hospitalist saw the patient, says she can go home, and would like me to prescribe Percocet No. 20 1-2 every 6 hours as needed Wesley Hernandez MD  6:16 PM

## 2020-09-12 NOTE — CONSULTS
Gynecology History and Physical    Name: Sorin Rayo MRN: [id number] SSN: [id number]    YOB: 1980  Age: 44 y.o. Sex: female       Subjective:      Chief complaint:  8 week SAB    Jenn Forrest is a 44 y.o.  female with a history of abnormal uterine bleeding. Ultrasound findings include: An intrauterine pregnancy is not identified. There is complex material within  the lower uterine segment. This may be a miscarriage in progress. She was recently seen by Dr. Teresa Maradiaga and Rosalba Favors were drawn which showed a decreasing trend c/w inevitable SAB. She began bleeding this morning and presented to the ED with c/o bleeding and pain. IV pain meds were of minimal help and she rated her pain 8/10. She denies HA, vis changes, SOB, cough, wheezing, n/v/d/c and swelling. She recently stopped smoking and changed her HTN medication to labetalol. This was a desired pregnancy. The current method of family planning is none. OB History    No obstetric history on file. Past Medical History:   Diagnosis Date    Asthma     Diabetes ([address]     GERD (gastroesophageal reflux disease)     Hypertension      Past Surgical History:   Procedure Laterality Date    HX GYN      ovarian cyst     Social History     Occupational History    Not on file   Tobacco Use    Smoking status: Former Smoker     Packs/day: 0.50    Smokeless tobacco: Never Used   Substance and Sexual Activity    Alcohol use: Not Currently     Comment: socially    Drug use: No    Sexual activity: Not on file     Family History   Problem Relation Age of Onset    Diabetes Mother     Asthma Father         Allergies   Allergen Reactions    Amlodipine Other (comments)     Pt states it makes her BP higher    Percocet [Oxycodone-Acetaminophen] Itching     Denies this as an allergy.     Sulfa (Sulfonamide Antibiotics) Itching    Vicodin [Hydrocodone-Acetaminophen] Itching     Prior to Admission medications    Medication Sig Start Date End Date Taking? Authorizing Provider   multivitamin (ONE A DAY) tablet Take 1 Tab by mouth daily. Marta Mahan MD   B.infantis-B.ani-B.long-B.bifi (Probiotic 4X) 10-15 mg TbEC Take  by mouth daily. Marta Mahan MD   acetaminophen (Tylenol Extra Strength) 500 mg tablet Take  by mouth every six (6) hours as needed for Pain. Marta Mahan MD   pantoprazole (Protonix) 20 mg tablet Take 20 mg by mouth daily. Marta Mahan MD   cyclobenzaprine (FLEXERIL) 10 mg tablet Take 1 Tab by mouth three (3) times daily as needed for Muscle Spasm(s) for up to 10 doses. Indications: muscle spasm 5/12/20   Tere Vargas MD   metFORMIN (GLUCOPHAGE) 850 mg tablet Take 1 Tab by mouth two (2) times daily (with meals). Patient taking differently: Take 500 mg by mouth two (2) times daily (with meals). 7/3/19   Dorothy Gilmore MD   cetirizine (ZYRTEC) 10 mg tablet Take 10 mg by mouth daily. Provider, Historical   sertraline (ZOLOFT) 50 mg tablet Take 50 mg by mouth daily. Provider, Historical   montelukast (SINGULAIR) 10 mg tablet Take 10 mg by mouth daily. Provider, Historical   albuterol-ipratropium (DUO-NEB) 2.5 mg-0.5 mg/3 ml nebu 3 mL by Nebulization route every six (6) hours as needed. Provider, Historical   albuterol (PROAIR HFA) 90 mcg/actuation inhaler Take 2 Puffs by inhalation every four (4) hours as needed. Provider, Historical        Review of Systems  A comprehensive review of systems was negative except for that written in the History of Present Illness. Objective:     Vitals:    09/12/20 1130 09/12/20 1200 09/12/20 1542 09/12/20 1600   BP: 135/62 (!) 151/97 (!) 140/88 (!) 150/85   Pulse: 75 91 63    Resp: 22 22 16    Temp:       SpO2: 97% 100% 100% 94%   Weight:       Height:           Physical Exam:  Patient without distress.   Heart: Regular rate and rhythm  Lung: clear to auscultation throughout lung fields, no wheezes, no rales, no rhonchi and normal respiratory effort  Abdomen: soft, nontender  Extr:no edema, cords or tenderness    Assessment/Plan:     SAB, 8 weeks with decreasing quants  Hemodynamically stable  Pain improved with Toradol and we discussed alternating short term narcotics with NSAIDS for adequate pain management. Rec cont PNV and Labetalol with f/u with Dr. Logan Fallon scheduled Monday and she will contact her PCP as well.       Signed By:  Adelina De La Cruz MD     September 12, 2020

## 2020-09-12 NOTE — ED PROVIDER NOTES
Chief Complaint   Patient presents with    Vaginal Bleeding    Miscarriage     This is a 70-year-old female with asthma, diabetes, presenting with vaginal bleeding several days after she was told by her OB/GYN that she was likely having an active spontaneous  at around 8 weeks as her quantitative hCG had been trending downwards. Her u pelvic ultrasound on 2020 demonstrated a small gestational sac within the endometrial canal but no fetal pole. Today she reports worsening pelvic pain and cramping, as well as increased vaginal bleeding and passage of clots. No fevers, cough, chest pain, shortness of breath, or any other systemic complaints. Symptoms are moderate in nature without any alleviating or exacerbating factors.       Past Medical History:   Diagnosis Date    Asthma     Diabetes ([facility] [address]     GERD (gastroesophageal reflux disease)     Hypertension        Past Surgical History:   Procedure Laterality Date    HX GYN      ovarian cyst         Family History:   Problem Relation Age of Onset    Diabetes Mother     Asthma Father        Social History     Socioeconomic History    Marital status:      Spouse name: Not on file    Number of children: Not on file    Years of education: Not on file    Highest education level: Not on file   Occupational History    Not on file   Social Needs    Financial resource strain: Not on file    Food insecurity     Worry: Not on file     Inability: Not on file    Transportation needs     Medical: Not on file     Non-medical: Not on file   Tobacco Use    Smoking status: Former Smoker     Packs/day: 0.50    Smokeless tobacco: Never Used   Substance and Sexual Activity    Alcohol use: Not Currently     Comment: socially    Drug use: No    Sexual activity: Not on file   Lifestyle    Physical activity     Days per week: Not on file     Minutes per session: Not on file    Stress: Not on file   Relationships    Social connections     Talks on phone: Not on file     Gets together: Not on file     Attends [employer] service: Not on file     Active member of club or organization: Not on file     Attends meetings of clubs or organizations: Not on file     Relationship status: Not on file    Intimate partner violence     Fear of current or ex partner: Not on file     Emotionally abused: Not on file     Physically abused: Not on file     Forced sexual activity: Not on file   Other Topics Concern     Service Not Asked    Blood Transfusions Not Asked    Caffeine Concern Not Asked    Occupational Exposure Not Asked   [place] Hazards Not Asked    Sleep Concern Not Asked    Stress Concern Not Asked    Weight Concern Not Asked    Special Diet Not Asked    Back Care Not Asked    Exercise Not Asked    Bike Helmet Not Asked   [place] Not Asked    Self-Exams Not Asked   Social History Narrative    Not on file         ALLERGIES: Amlodipine; Percocet [oxycodone-acetaminophen]; Sulfa (sulfonamide antibiotics); and Vicodin [hydrocodone-acetaminophen]    Review of Systems   Constitutional: Negative for fever. Respiratory: Negative for shortness of breath. Cardiovascular: Negative for chest pain. Gastrointestinal: Positive for abdominal pain. Genitourinary: Positive for vaginal bleeding. Musculoskeletal: Positive for back pain. All other systems reviewed and are negative.       Vitals:    09/12/20 1041 09/12/20 1130 09/12/20 1200   BP: (!) 145/120 135/62 (!) 151/97   Pulse: 80 75 91   Resp: 22 22 22   Temp: 97.6 °F (36.4 °C)     SpO2: 100% 97% 100%   Weight: 90.7 kg (200 lb)     Height: 5' 1\" (1.549 m)              Physical Exam  General:  Awake and alert, NAD  HEENT:  NC/AT, equal pupils, moist mucous membranes  Neck:   Normal inspection, full range of motion  Cardiac:  RRR, no murmurs  Respiratory:  Clear bilaterally, no wheezes, rales, rhonchi  Abdomen:  Soft and nondistended, tender diffusely across the lower abdomen without lateralization or guarding  Pelvic:  Moderate amount of vaginal bleeding, small blood clots in the vaginal canal, no purulent discharge  Extremities: Warm and well perfused, no peripheral edema  Neuro:  Moving all extremities symmetrically without gross motor deficit  Skin:   No rashes or pallor    RESULTS  Recent Results (from the past 12 hour(s))   SAMPLES BEING HELD    Collection Time: 09/12/20 10:47 AM   Result Value Ref Range    SAMPLES BEING HELD 1 RED, 1 BLUE     COMMENT        Add-on orders for these samples will be processed based on acceptable specimen integrity and analyte stability, which may vary by analyte. CBC WITH AUTOMATED DIFF    Collection Time: 09/12/20 10:47 AM   Result Value Ref Range    WBC 7.6 3.6 - 11.0 K/uL    RBC 3.88 3.80 - 5.20 M/uL    HGB 8.7 (L) 11.5 - 16.0 g/dL    HCT 29.5 (L) 35.0 - 47.0 %    MCV 76.0 (L) 80.0 - 99.0 FL    MCH 22.4 (L) 26.0 - 34.0 PG    MCHC 29.5 (L) 30.0 - 36.5 g/dL    RDW 19.7 (H) 11.5 - 14.5 %    PLATELET 336 044 - 626 K/uL    MPV 9.3 8.9 - 12.9 FL    NRBC 0.0 0  WBC    ABSOLUTE NRBC 0.00 0.00 - 0.01 K/uL    NEUTROPHILS 83 (H) 32 - 75 %    LYMPHOCYTES 11 (L) 12 - 49 %    MONOCYTES 6 5 - 13 %    EOSINOPHILS 0 0 - 7 %    BASOPHILS 0 0 - 1 %    IMMATURE GRANULOCYTES 0 0.0 - 0.5 %    ABS. NEUTROPHILS 6.3 1.8 - 8.0 K/UL    ABS. LYMPHOCYTES 0.8 0.8 - 3.5 K/UL    ABS. MONOCYTES 0.5 0.0 - 1.0 K/UL    ABS. EOSINOPHILS 0.0 0.0 - 0.4 K/UL    ABS. BASOPHILS 0.0 0.0 - 0.1 K/UL    ABS. IMM.  GRANS. 0.0 0.00 - 0.04 K/UL    DF AUTOMATED     METABOLIC PANEL, COMPREHENSIVE    Collection Time: 09/12/20 10:47 AM   Result Value Ref Range    Sodium 136 136 - 145 mmol/L    Potassium 3.5 3.5 - 5.1 mmol/L    Chloride 103 97 - 108 mmol/L    CO2 24 21 - 32 mmol/L    Anion gap 9 5 - 15 mmol/L    Glucose 196 (H) 65 - 100 mg/dL    BUN 6 6 - 20 MG/DL    Creatinine 0.79 0.55 - 1.02 MG/DL    BUN/Creatinine ratio 8 (L) 12 - 20      GFR est AA >60 >60 ml/min/1.73m2    GFR est non-AA >60 >60 ml/min/1.73m2    Calcium 9.0 8.5 - 10.1 MG/DL    Bilirubin, total 0.5 0.2 - 1.0 MG/DL    ALT (SGPT) 20 12 - 78 U/L    AST (SGOT) 16 15 - 37 U/L    Alk. phosphatase 69 45 - 117 U/L    Protein, total 7.5 6.4 - 8.2 g/dL    Albumin 3.4 (L) 3.5 - 5.0 g/dL    Globulin 4.1 (H) 2.0 - 4.0 g/dL    A-G Ratio 0.8 (L) 1.1 - 2.2     PROTHROMBIN TIME + INR    Collection Time: 09/12/20 10:47 AM   Result Value Ref Range    INR 1.0 0.9 - 1.1      Prothrombin time 10.4 9.0 - 11.1 sec   BETA HCG, QT    Collection Time: 09/12/20 10:47 AM   Result Value Ref Range    Beta HCG, QT 5,237 (H) 0 - 6 MIU/ML   BLOOD TYPE, (ABO+RH)    Collection Time: 09/12/20 10:47 AM   Result Value Ref Range    ABO/Rh(D) B NEGATIVE     Comment SAMPLE NOT USABLE FOR CROSSMATCH    URINALYSIS W/MICROSCOPIC    Collection Time: 09/12/20  1:01 PM   Result Value Ref Range    Color RED      Appearance BLOODY (A) CLEAR      Specific gravity 1.020 1.003 - 1.030      pH (UA) 8.0 5.0 - 8.0      Protein 100 (A) NEG mg/dL    Glucose 100 (A) NEG mg/dL    Ketone Negative NEG mg/dL    Bilirubin Negative NEG      Blood LARGE (A) NEG      Urobilinogen 0.2 0.2 - 1.0 EU/dL    Nitrites Negative NEG      Leukocyte Esterase Negative NEG      WBC 0-4 0 - 4 /hpf    RBC >100 (H) 0 - 5 /hpf    Epithelial cells FEW FEW /lpf    Bacteria Negative NEG /hpf   URINE CULTURE HOLD SAMPLE    Collection Time: 09/12/20  1:01 PM    Specimen: Serum; Urine   Result Value Ref Range    Urine culture hold        Urine on hold in Microbiology dept for 2 days. If unpreserved urine is submitted, it cannot be used for addtional testing after 24 hours, recollection will be required.    WET PREP    Collection Time: 09/12/20  2:31 PM    Specimen: Miscellaneous sample; Vaginal   Result Value Ref Range    Clue cells CLUE CELLS ABSENT      Wet prep NO TRICHOMONAS SEEN     RH IMMUNE GLOBULIN PROPHYLACTIC    Collection Time: 09/12/20  2:45 PM   Result Value Ref Range    No. of weeks gestation 8      Unit number [id number]     Blood component type RH IMMUNE GLOBULIN     Unit division 00     Status of unit ALLOCATED         IMAGING  Us Transvaginal    Result Date: 2020  IMPRESSION: 1. An intrauterine pregnancy is not identified. There is complex material within the lower uterine segment. This may be a miscarriage in progress but close clinical follow-up is recommended     Us Preg Uts < 14 Wks Sngl    Result Date: 2020  IMPRESSION: 1. An intrauterine pregnancy is not identified. There is complex material within the lower uterine segment. This may be a miscarriage in progress but close clinical follow-up is recommended       Procedures - none unless documented below    ED course: Labs and imaging reviewed. She's having an active miscarriage. Rh negative but received Rhogam during recent ED visit several days ago. After receiving multiple rounds of IV morphine and Dilaudid still having intractable pain, discussed with the OB hospitalist [facility]) who will evaluate at the bedside, may need admission for pain control. Patient signed out to onccelso Pérez.     Impression: Spontaneous   Disposition: Pending

## 2020-09-14 LAB
C TRACH DNA SPEC QL NAA+PROBE: NEGATIVE
N GONORRHOEA DNA SPEC QL NAA+PROBE: NEGATIVE
SAMPLE TYPE: NORMAL
SERVICE CMNT-IMP: NORMAL
SPECIMEN SOURCE: NORMAL

## 2021-04-11 ENCOUNTER — HOSPITAL ENCOUNTER (EMERGENCY)
Age: 41
Discharge: HOME | End: 2021-04-11
Attending: STUDENT IN AN ORGANIZED HEALTH CARE EDUCATION/TRAINING PROGRAM

## 2021-04-11 ENCOUNTER — APPOINTMENT (OUTPATIENT)
Dept: ULTRASOUND IMAGING | Age: 41
End: 2021-04-11
Attending: STUDENT IN AN ORGANIZED HEALTH CARE EDUCATION/TRAINING PROGRAM

## 2021-04-11 VITALS
HEART RATE: 74 BPM | DIASTOLIC BLOOD PRESSURE: 111 MMHG | RESPIRATION RATE: 15 BRPM | OXYGEN SATURATION: 100 % | TEMPERATURE: 98.5 F | SYSTOLIC BLOOD PRESSURE: 210 MMHG

## 2021-04-11 DIAGNOSIS — R03.0 ELEVATED BLOOD PRESSURE READING: ICD-10-CM

## 2021-04-11 DIAGNOSIS — N93.9 VAGINAL BLEEDING: ICD-10-CM

## 2021-04-11 DIAGNOSIS — R10.2 PELVIC PAIN: Primary | ICD-10-CM

## 2021-04-11 LAB
ABO + RH BLD: NORMAL
ALBUMIN SERPL-MCNC: 3.7 G/DL (ref 3.5–5)
ALBUMIN/GLOB SERPL: 0.9 {RATIO} (ref 1.1–2.2)
ALP SERPL-CCNC: 73 U/L (ref 45–117)
ALT SERPL-CCNC: 17 U/L (ref 12–78)
ANION GAP SERPL CALC-SCNC: 6 MMOL/L (ref 5–15)
AST SERPL-CCNC: 10 U/L (ref 15–37)
BASOPHILS # BLD: 0 K/UL (ref 0–0.1)
BASOPHILS NFR BLD: 0 % (ref 0–1)
BILIRUB SERPL-MCNC: 0.4 MG/DL (ref 0.2–1)
BLOOD GROUP ANTIBODIES SERPL: NORMAL
BUN SERPL-MCNC: 6 MG/DL (ref 6–20)
BUN/CREAT SERPL: 7 (ref 12–20)
CALCIUM SERPL-MCNC: 9 MG/DL (ref 8.5–10.1)
CHLORIDE SERPL-SCNC: 103 MMOL/L (ref 97–108)
CO2 SERPL-SCNC: 29 MMOL/L (ref 21–32)
COMMENT, HOLDF: NORMAL
CREAT SERPL-MCNC: 0.82 MG/DL (ref 0.55–1.02)
DIFFERENTIAL METHOD BLD: ABNORMAL
EOSINOPHIL # BLD: 0 K/UL (ref 0–0.4)
EOSINOPHIL NFR BLD: 0 % (ref 0–7)
ERYTHROCYTE [DISTWIDTH] IN BLOOD BY AUTOMATED COUNT: 18.6 % (ref 11.5–14.5)
GLOBULIN SER CALC-MCNC: 4.1 G/DL (ref 2–4)
GLUCOSE SERPL-MCNC: 145 MG/DL (ref 65–100)
HCG SERPL-ACNC: 791 MIU/ML (ref 0–6)
HCT VFR BLD AUTO: 32.9 % (ref 35–47)
HGB BLD-MCNC: 9.9 G/DL (ref 11.5–16)
IMM GRANULOCYTES # BLD AUTO: 0 K/UL (ref 0–0.04)
IMM GRANULOCYTES NFR BLD AUTO: 1 % (ref 0–0.5)
LYMPHOCYTES # BLD: 1.3 K/UL (ref 0.8–3.5)
LYMPHOCYTES NFR BLD: 19 % (ref 12–49)
MCH RBC QN AUTO: 22.4 PG (ref 26–34)
MCHC RBC AUTO-ENTMCNC: 30.1 G/DL (ref 30–36.5)
MCV RBC AUTO: 74.4 FL (ref 80–99)
MONOCYTES # BLD: 0.4 K/UL (ref 0–1)
MONOCYTES NFR BLD: 6 % (ref 5–13)
NEUTS SEG # BLD: 4.9 K/UL (ref 1.8–8)
NEUTS SEG NFR BLD: 74 % (ref 32–75)
NRBC # BLD: 0 K/UL (ref 0–0.01)
NRBC BLD-RTO: 0 PER 100 WBC
PLATELET # BLD AUTO: 270 K/UL (ref 150–400)
PMV BLD AUTO: 9.6 FL (ref 8.9–12.9)
POTASSIUM SERPL-SCNC: 3.3 MMOL/L (ref 3.5–5.1)
PROT SERPL-MCNC: 7.8 G/DL (ref 6.4–8.2)
RBC # BLD AUTO: 4.42 M/UL (ref 3.8–5.2)
SAMPLES BEING HELD,HOLD: NORMAL
SODIUM SERPL-SCNC: 138 MMOL/L (ref 136–145)
SPECIMEN EXP DATE BLD: NORMAL
WBC # BLD AUTO: 6.6 K/UL (ref 3.6–11)

## 2021-04-11 PROCEDURE — 80053 COMPREHEN METABOLIC PANEL: CPT

## 2021-04-11 PROCEDURE — 85025 COMPLETE CBC W/AUTO DIFF WBC: CPT

## 2021-04-11 PROCEDURE — 76830 TRANSVAGINAL US NON-OB: CPT

## 2021-04-11 PROCEDURE — 36415 COLL VENOUS BLD VENIPUNCTURE: CPT

## 2021-04-11 PROCEDURE — 86901 BLOOD TYPING SEROLOGIC RH(D): CPT

## 2021-04-11 PROCEDURE — 84702 CHORIONIC GONADOTROPIN TEST: CPT

## 2021-04-11 PROCEDURE — 96374 THER/PROPH/DIAG INJ IV PUSH: CPT

## 2021-04-11 PROCEDURE — 76856 US EXAM PELVIC COMPLETE: CPT

## 2021-04-11 PROCEDURE — 74011250636 HC RX REV CODE- 250/636: Performed by: STUDENT IN AN ORGANIZED HEALTH CARE EDUCATION/TRAINING PROGRAM

## 2021-04-11 PROCEDURE — 99283 EMERGENCY DEPT VISIT LOW MDM: CPT

## 2021-04-11 RX ORDER — SODIUM CHLORIDE 9 MG/ML
1000 INJECTION, SOLUTION INTRAVENOUS ONCE
Status: COMPLETED | OUTPATIENT
Start: 2021-04-11 | End: 2021-04-11

## 2021-04-11 RX ORDER — KETOROLAC TROMETHAMINE 30 MG/ML
30 INJECTION, SOLUTION INTRAMUSCULAR; INTRAVENOUS
Status: COMPLETED | OUTPATIENT
Start: 2021-04-11 | End: 2021-04-11

## 2021-04-11 RX ORDER — KETOROLAC TROMETHAMINE 10 MG/1
10 TABLET, FILM COATED ORAL
Qty: 8 TAB | Refills: 0 | Status: SHIPPED | OUTPATIENT
Start: 2021-04-11 | End: 2021-04-13

## 2021-04-11 RX ADMIN — SODIUM CHLORIDE 1000 ML: 9 INJECTION, SOLUTION INTRAVENOUS at 14:47

## 2021-04-11 RX ADMIN — KETOROLAC TROMETHAMINE 30 MG: 30 INJECTION, SOLUTION INTRAMUSCULAR at 14:48

## 2021-04-11 NOTE — ED PROVIDER NOTES
Patient is a 55-year-old female G2, P0 presenting today secondary to pelvic pain and bleeding. 2 weeks ago she had an  at a local women's clinic at 6 weeks and 1 day pregnancy. She states that she had this because she had a prior miscarriage which was traumatic to her. She states that they initially were going to try to mechanically remove the  pregnancy however were not able to do so and \"put a cream in my uterus\" to chemically terminate the pregnancy. Patient states that since then she has had severe pelvic pain and vaginal bleeding. She has been passing clots. She is followed up with the physician that performed this procedure twice now who said that she has passed the products of conception however may continue to bleed. Patient is taking ibuprofen, Tylenol and Percocet 7.5 mg without any relief. She took Midol last night was feeling little bit better however this morning the pain  was 10 times worse. She has bleeding through about 1 pad every 2 hours or so.            Past Medical History:   Diagnosis Date    Asthma     Diabetes ([facility] [address]     GERD (gastroesophageal reflux disease)     Hypertension        Past Surgical History:   Procedure Laterality Date    HX GYN      ovarian cyst         Family History:   Problem Relation Age of Onset    Diabetes Mother     Asthma Father        Social History     Socioeconomic History    Marital status:      Spouse name: Not on file    Number of children: Not on file    Years of education: Not on file    Highest education level: Not on file   Occupational History    Not on file   Social Needs    Financial resource strain: Not on file    Food insecurity     Worry: Not on file     Inability: Not on file    Transportation needs     Medical: Not on file     Non-medical: Not on file   Tobacco Use    Smoking status: Former Smoker     Packs/day: 0.50    Smokeless tobacco: Never Used   Substance and Sexual Activity    Alcohol use: Not Currently Comment: socially    Drug use: No    Sexual activity: Not on file   Lifestyle    Physical activity     Days per week: Not on file     Minutes per session: Not on file    Stress: Not on file   Relationships    Social connections     Talks on phone: Not on file     Gets together: Not on file     Attends Church service: Not on file     Active member of club or organization: Not on file     Attends meetings of clubs or organizations: Not on file     Relationship status: Not on file    Intimate partner violence     Fear of current or ex partner: Not on file     Emotionally abused: Not on file     Physically abused: Not on file     Forced sexual activity: Not on file   Other Topics Concern     Service Not Asked    Blood Transfusions Not Asked    Caffeine Concern Not Asked    Occupational Exposure Not Asked   [employer] [place] Hazards Not Asked    Sleep Concern Not Asked    Stress Concern Not Asked    Weight Concern Not Asked    Special Diet Not Asked    Back Care Not Asked    Exercise Not Asked    Bike Helmet Not Asked   [address] Not Asked    Self-Exams Not Asked   Social History Narrative    Not on file         ALLERGIES: Amlodipine, Percocet [oxycodone-acetaminophen], Sulfa (sulfonamide antibiotics), and Vicodin [hydrocodone-acetaminophen]    Review of Systems   Constitutional: Negative for chills and fever. HENT: Negative for congestion and rhinorrhea. Eyes: Negative for redness and visual disturbance. Respiratory: Negative for cough and shortness of breath. Cardiovascular: Negative for chest pain and leg swelling. Gastrointestinal: Negative for diarrhea, nausea and vomiting. Genitourinary: Positive for pelvic pain and vaginal bleeding. Negative for dysuria, flank pain, frequency, hematuria and urgency. Musculoskeletal: Negative for arthralgias, back pain, myalgias and neck pain. Skin: Negative for rash and wound. Allergic/Immunologic: Negative for immunocompromised state. Neurological: Negative for dizziness and headaches. Vitals:    21 1412   BP: 134/84   Pulse: 84   Resp: 16   Temp: 98 °F (36.7 °C)   SpO2: 99%            Physical Exam  Vitals signs and nursing note reviewed. Constitutional:       General: She is not in acute distress. Appearance: She is well-developed. She is not diaphoretic. HENT:      Head: Normocephalic. Mouth/Throat:      Pharynx: No oropharyngeal exudate. Eyes:      General:         Right eye: No discharge. Left eye: No discharge. Pupils: Pupils are equal, round, and reactive to light. Neck:      Musculoskeletal: Normal range of motion and neck supple. Cardiovascular:      Rate and Rhythm: Normal rate and regular rhythm. Heart sounds: Normal heart sounds. No murmur. No friction rub. No gallop. Pulmonary:      Effort: Pulmonary effort is normal. No respiratory distress. Breath sounds: Normal breath sounds. No stridor. No wheezing or rales. Abdominal:      General: Bowel sounds are normal. There is no distension. Palpations: Abdomen is soft. Tenderness: There is abdominal tenderness (suprapubic). There is no guarding or rebound. Genitourinary:     Comments: Pelvic exam performed with [facility] as chaperone  Normal external genitalia  Minimal bleeding noted, no large clots  Musculoskeletal: Normal range of motion. General: No deformity. Skin:     General: Skin is warm and dry. Capillary Refill: Capillary refill takes less than 2 seconds. Findings: No rash. Neurological:      Mental Status: She is alert and oriented to person, place, and time. Psychiatric:         Behavior: Behavior normal.           MDM:  Patient is a 44-year-old female presenting today with vaginal bleeding and pelvic pain in the setting of a recent medicine induced  after failed mechanical pregnancy evacuation. She was worried about her blood counts however her hemoglobin today is stable. She is not tachycardic or hypotensive, in fact she is hypertensive. She forgot to take her medications for her blood pressure today. She is not having headaches, dizziness, chest pain or shortness of breath. She is going to take her medicines when she gets home. She was noted to have minimal bleeding on my exam.  Her pelvic ultrasound showed no retained products of conception or other acute process. She has seen her OB 3 several times regarding this presentation and they felt everything was progressing appropriately. She sees them again tomorrow. She will return if fever worsening pain or worsening bleeding. Clinical Impression:     ICD-10-CM ICD-9-CM    1. Pelvic pain  R10.2 [id number]    2. Vaginal bleeding  N93.9 623.8    3.  Elevated blood pressure reading  R03.0 796.2            Disposition: RONAN Melton, DO

## 2021-04-11 NOTE — ED TRIAGE NOTES
Triage: arrives ambulatory from home with CC of pelvic pain following an  she had 2 Monday's ago. She reports she has had two follow ups with the physician that performed the procedure and has an additional follow up scheduled Monday. Pt reports pain that  Is unrelieved with the percocet she has been prescribed.

## 2021-12-06 ENCOUNTER — HOSPITAL ENCOUNTER (EMERGENCY)
Age: 41
Discharge: HOME | End: 2021-12-07
Attending: EMERGENCY MEDICINE

## 2021-12-06 ENCOUNTER — HOSPITAL ENCOUNTER (EMERGENCY)
Age: 41
Discharge: LEFT BEFORE DISCHARGE | End: 2021-12-06

## 2021-12-06 VITALS
RESPIRATION RATE: 18 BRPM | HEART RATE: 93 BPM | SYSTOLIC BLOOD PRESSURE: 238 MMHG | TEMPERATURE: 98 F | OXYGEN SATURATION: 99 % | DIASTOLIC BLOOD PRESSURE: 142 MMHG

## 2021-12-06 DIAGNOSIS — E87.6 HYPOKALEMIA: Primary | ICD-10-CM

## 2021-12-06 DIAGNOSIS — I10 PRIMARY HYPERTENSION: ICD-10-CM

## 2021-12-06 LAB
ALBUMIN SERPL-MCNC: 3.5 G/DL (ref 3.5–5)
ALBUMIN/GLOB SERPL: 0.8 {RATIO} (ref 1.1–2.2)
ALP SERPL-CCNC: 65 U/L (ref 45–117)
ALT SERPL-CCNC: 21 U/L (ref 12–78)
ANION GAP SERPL CALC-SCNC: 4 MMOL/L (ref 5–15)
AST SERPL-CCNC: 22 U/L (ref 15–37)
BASOPHILS # BLD: 0 K/UL (ref 0–0.1)
BASOPHILS NFR BLD: 0 % (ref 0–1)
BILIRUB SERPL-MCNC: 0.4 MG/DL (ref 0.2–1)
BUN SERPL-MCNC: 7 MG/DL (ref 6–20)
BUN/CREAT SERPL: 7 (ref 12–20)
CALCIUM SERPL-MCNC: 9 MG/DL (ref 8.5–10.1)
CHLORIDE SERPL-SCNC: 107 MMOL/L (ref 97–108)
CO2 SERPL-SCNC: 29 MMOL/L (ref 21–32)
COMMENT, HOLDF: NORMAL
CREAT SERPL-MCNC: 0.97 MG/DL (ref 0.55–1.02)
DIFFERENTIAL METHOD BLD: ABNORMAL
EOSINOPHIL # BLD: 0.2 K/UL (ref 0–0.4)
EOSINOPHIL NFR BLD: 2 % (ref 0–7)
ERYTHROCYTE [DISTWIDTH] IN BLOOD BY AUTOMATED COUNT: 16.1 % (ref 11.5–14.5)
GLOBULIN SER CALC-MCNC: 4.3 G/DL (ref 2–4)
GLUCOSE SERPL-MCNC: 121 MG/DL (ref 65–100)
HCT VFR BLD AUTO: 34.4 % (ref 35–47)
HGB BLD-MCNC: 10.4 G/DL (ref 11.5–16)
IMM GRANULOCYTES # BLD AUTO: 0 K/UL (ref 0–0.04)
IMM GRANULOCYTES NFR BLD AUTO: 0 % (ref 0–0.5)
LYMPHOCYTES # BLD: 1.9 K/UL (ref 0.8–3.5)
LYMPHOCYTES NFR BLD: 23 % (ref 12–49)
MCH RBC QN AUTO: 24.4 PG (ref 26–34)
MCHC RBC AUTO-ENTMCNC: 30.2 G/DL (ref 30–36.5)
MCV RBC AUTO: 80.6 FL (ref 80–99)
MONOCYTES # BLD: 0.4 K/UL (ref 0–1)
MONOCYTES NFR BLD: 5 % (ref 5–13)
NEUTS SEG # BLD: 5.7 K/UL (ref 1.8–8)
NEUTS SEG NFR BLD: 70 % (ref 32–75)
NRBC # BLD: 0 K/UL (ref 0–0.01)
NRBC BLD-RTO: 0 PER 100 WBC
PLATELET # BLD AUTO: 306 K/UL (ref 150–400)
PMV BLD AUTO: 11.1 FL (ref 8.9–12.9)
POTASSIUM SERPL-SCNC: 2.9 MMOL/L (ref 3.5–5.1)
PROT SERPL-MCNC: 7.8 G/DL (ref 6.4–8.2)
RBC # BLD AUTO: 4.27 M/UL (ref 3.8–5.2)
SAMPLES BEING HELD,HOLD: NORMAL
SODIUM SERPL-SCNC: 140 MMOL/L (ref 136–145)
TROPONIN-HIGH SENSITIVITY: 19 NG/L (ref 0–51)
WBC # BLD AUTO: 8.2 K/UL (ref 3.6–11)

## 2021-12-06 PROCEDURE — 96374 THER/PROPH/DIAG INJ IV PUSH: CPT

## 2021-12-06 PROCEDURE — 96375 TX/PRO/DX INJ NEW DRUG ADDON: CPT

## 2021-12-06 PROCEDURE — 74011000250 HC RX REV CODE- 250: Performed by: EMERGENCY MEDICINE

## 2021-12-06 PROCEDURE — 99284 EMERGENCY DEPT VISIT MOD MDM: CPT

## 2021-12-06 PROCEDURE — 74011250637 HC RX REV CODE- 250/637: Performed by: EMERGENCY MEDICINE

## 2021-12-06 PROCEDURE — 80053 COMPREHEN METABOLIC PANEL: CPT

## 2021-12-06 PROCEDURE — 96361 HYDRATE IV INFUSION ADD-ON: CPT

## 2021-12-06 PROCEDURE — 85025 COMPLETE CBC W/AUTO DIFF WBC: CPT

## 2021-12-06 PROCEDURE — 36415 COLL VENOUS BLD VENIPUNCTURE: CPT

## 2021-12-06 PROCEDURE — 84484 ASSAY OF TROPONIN QUANT: CPT

## 2021-12-06 PROCEDURE — 75810000275 HC EMERGENCY DEPT VISIT NO LEVEL OF CARE

## 2021-12-06 PROCEDURE — 93005 ELECTROCARDIOGRAM TRACING: CPT

## 2021-12-06 PROCEDURE — 83735 ASSAY OF MAGNESIUM: CPT

## 2021-12-06 RX ORDER — POTASSIUM CHLORIDE 750 MG/1
40 TABLET, FILM COATED, EXTENDED RELEASE ORAL
Status: COMPLETED | OUTPATIENT
Start: 2021-12-06 | End: 2021-12-06

## 2021-12-06 RX ORDER — LABETALOL HYDROCHLORIDE 5 MG/ML
10 INJECTION, SOLUTION INTRAVENOUS
Status: COMPLETED | OUTPATIENT
Start: 2021-12-06 | End: 2021-12-06

## 2021-12-06 RX ORDER — POTASSIUM CHLORIDE 7.45 MG/ML
10 INJECTION INTRAVENOUS
Status: COMPLETED | OUTPATIENT
Start: 2021-12-06 | End: 2021-12-07

## 2021-12-06 RX ADMIN — LABETALOL HYDROCHLORIDE 10 MG: 5 INJECTION INTRAVENOUS at 23:57

## 2021-12-06 RX ADMIN — POTASSIUM CHLORIDE 40 MEQ: 750 TABLET, FILM COATED, EXTENDED RELEASE ORAL at 23:55

## 2021-12-06 NOTE — Clinical Note
[address]  [facility] EMERGENCY DEPT  [address] [id number]  [phone]    Work/School Note    Date: 12/6/2021    To Whom It May concern: Brenda Mercer was seen and treated today in the emergency room by the following provider(s):  Attending Provider: Sandy Read MD.      Brenda Mercer is excused from work/school on 12/07/21. She is clear to return to work/school on 12/08/21.         Sincerely,          Wilner Castillo MD

## 2021-12-07 VITALS
DIASTOLIC BLOOD PRESSURE: 101 MMHG | TEMPERATURE: 98.1 F | WEIGHT: 188.71 LBS | RESPIRATION RATE: 23 BRPM | OXYGEN SATURATION: 99 % | SYSTOLIC BLOOD PRESSURE: 167 MMHG | HEART RATE: 74 BPM | BODY MASS INDEX: 35.63 KG/M2 | HEIGHT: 61 IN

## 2021-12-07 LAB
ATRIAL RATE: 83 BPM
CALCULATED P AXIS, ECG09: 48 DEGREES
CALCULATED R AXIS, ECG10: 104 DEGREES
CALCULATED T AXIS, ECG11: 36 DEGREES
DIAGNOSIS, 93000: NORMAL
MAGNESIUM SERPL-MCNC: 2 MG/DL (ref 1.6–2.4)
P-R INTERVAL, ECG05: 198 MS
Q-T INTERVAL, ECG07: 400 MS
QRS DURATION, ECG06: 92 MS
QTC CALCULATION (BEZET), ECG08: 470 MS
VENTRICULAR RATE, ECG03: 83 BPM

## 2021-12-07 PROCEDURE — 96361 HYDRATE IV INFUSION ADD-ON: CPT

## 2021-12-07 PROCEDURE — 96375 TX/PRO/DX INJ NEW DRUG ADDON: CPT

## 2021-12-07 PROCEDURE — 74011250637 HC RX REV CODE- 250/637: Performed by: EMERGENCY MEDICINE

## 2021-12-07 PROCEDURE — 74011250636 HC RX REV CODE- 250/636: Performed by: EMERGENCY MEDICINE

## 2021-12-07 RX ORDER — HYDRALAZINE HYDROCHLORIDE 20 MG/ML
10 INJECTION INTRAMUSCULAR; INTRAVENOUS
Status: COMPLETED | OUTPATIENT
Start: 2021-12-07 | End: 2021-12-07

## 2021-12-07 RX ORDER — HYDROCHLOROTHIAZIDE 25 MG/1
25 TABLET ORAL DAILY
COMMUNITY

## 2021-12-07 RX ORDER — IBUPROFEN 800 MG/1
800 TABLET ORAL
COMMUNITY

## 2021-12-07 RX ORDER — LABETALOL 200 MG/1
200 TABLET, FILM COATED ORAL 2 TIMES DAILY
COMMUNITY
Start: 2021-11-28

## 2021-12-07 RX ORDER — BUPROPION HYDROCHLORIDE 150 MG/1
150 TABLET ORAL DAILY
COMMUNITY
Start: 2021-11-28

## 2021-12-07 RX ORDER — CLONIDINE HYDROCHLORIDE 0.2 MG/1
0.2 TABLET ORAL
Qty: 10 TABLET | Refills: 0 | Status: SHIPPED | OUTPATIENT
Start: 2021-12-07 | End: 2022-10-24

## 2021-12-07 RX ORDER — POTASSIUM CHLORIDE 1500 MG/1
20 TABLET, FILM COATED, EXTENDED RELEASE ORAL 2 TIMES DAILY
Qty: 14 TABLET | Refills: 0 | Status: SHIPPED | OUTPATIENT
Start: 2021-12-07 | End: 2021-12-14

## 2021-12-07 RX ORDER — CLONIDINE HYDROCHLORIDE 0.1 MG/1
0.2 TABLET ORAL
Status: COMPLETED | OUTPATIENT
Start: 2021-12-07 | End: 2021-12-07

## 2021-12-07 RX ORDER — OMEPRAZOLE 20 MG/1
20 CAPSULE, DELAYED RELEASE ORAL DAILY
COMMUNITY
End: 2022-02-03

## 2021-12-07 RX ADMIN — CLONIDINE HYDROCHLORIDE 0.2 MG: 0.1 TABLET ORAL at 00:53

## 2021-12-07 RX ADMIN — HYDRALAZINE HYDROCHLORIDE 10 MG: 20 INJECTION INTRAMUSCULAR; INTRAVENOUS at 01:54

## 2021-12-07 RX ADMIN — POTASSIUM CHLORIDE 10 MEQ: 10 INJECTION, SOLUTION INTRAVENOUS at 00:01

## 2021-12-07 NOTE — ED NOTES
I have reviewed discharge instructions with the patient. The patient verbalized understanding. Pt given discharge instructions and prescription sent to pharmacy. Pt encouraged to follow up with PCP and cardiologist. Pt encouraged to return to the ED if she develops any new/worsening symptoms. PT denies any additional needs or concerns at this time.

## 2021-12-07 NOTE — ED TRIAGE NOTES
Pt states she feels like her potassium is low, she states this has happened to her before. States she has htn, bp 238/142 in triage. States she has had urinary frequency but her blood sugars have been fine. Pt also endorses chest pain and dizziness.

## 2021-12-07 NOTE — ED NOTES
PT ambulatory to room from triage. PT here today with complaints of low potassium. PT states that she went to [facility] due to having frequent urination, chest discomfort and sob. PT states that she has had low potassium before and she was told her potassium today was 2.9. PT states that the chest pain is intermittent and sharp. PT states that she Is on a diuretic and thinks that is what makes her potassium low. PT states that she normally has HTN but that it has been higher than normal. PT BP on arrival 215/132. PT deniess abd pain, n/v/d, fever, chills. Pt ANOX4, respirations even and unlabored, skin warm dry and intact, NAD noted.

## 2021-12-07 NOTE — DISCHARGE INSTRUCTIONS
It was a pleasure taking care of you in our Emergency Department today. We know that when you come to [facility], you are entrusting us with your health, comfort, and safety. Our physicians and nurses honor that trust, and truly appreciate the opportunity to care for you and your loved ones. We also value your feedback. If you receive a survey about your Emergency Department experience today, please fill it out. We care about our patients' feedback, and we listen to what you have to say. Thank you!       Dr. Maxi Suh MD.

## 2022-01-19 ENCOUNTER — OFFICE VISIT (OUTPATIENT)
Dept: URGENT CARE | Age: 42
End: 2022-01-19

## 2022-01-19 VITALS — OXYGEN SATURATION: 96 % | RESPIRATION RATE: 18 BRPM | HEART RATE: 64 BPM | TEMPERATURE: 99 F

## 2022-01-19 DIAGNOSIS — Z20.822 SUSPECTED COVID-19 VIRUS INFECTION: Primary | ICD-10-CM

## 2022-01-19 PROCEDURE — 99213 OFFICE O/P EST LOW 20 MIN: CPT | Performed by: FAMILY MEDICINE

## 2022-01-19 RX ORDER — METHYLPREDNISOLONE 4 MG/1
TABLET ORAL
Qty: 1 DOSE PACK | Refills: 0 | Status: SHIPPED | OUTPATIENT
Start: 2022-01-19 | End: 2022-02-03

## 2022-01-19 RX ORDER — PROMETHAZINE HYDROCHLORIDE AND DEXTROMETHORPHAN HYDROBROMIDE 6.25; 15 MG/5ML; MG/5ML
5 SYRUP ORAL
Qty: 60 ML | Refills: 0 | Status: SHIPPED | OUTPATIENT
Start: 2022-01-19 | End: 2022-02-03

## 2022-01-19 RX ORDER — BENZONATATE 200 MG/1
200 CAPSULE ORAL
Qty: 21 CAPSULE | Refills: 0 | Status: SHIPPED | OUTPATIENT
Start: 2022-01-19 | End: 2022-01-26

## 2022-01-20 NOTE — PROGRESS NOTES
This patient was seen at [address] [place] [address] Urgent Care while in their vehicle due to COVID-19 pandemic with PPE and focused examination in order to decrease community viral transmission. The patient/guardian gave verbal consent to treat. Cough  The history is provided by the patient. This is a new problem. The current episode started more than 2 days ago. The problem occurs constantly. The problem has not changed since onset. The cough is non-productive. There has been no fever. Associated symptoms include headaches, rhinorrhea, myalgias, shortness of breath and wheezing. Pertinent negatives include no chills. She has tried nothing for the symptoms. Risk factors: covid exposure. She is not a smoker. Her past medical history is significant for asthma.         Past Medical History:   Diagnosis Date    Asthma     Diabetes ([address]     GERD (gastroesophageal reflux disease)     Hypertension         Past Surgical History:   Procedure Laterality Date    HX GYN      ovarian cyst         Family History   Problem Relation Age of Onset    Diabetes Mother     Asthma Father         Social History     Socioeconomic History    Marital status: LEGALLY      Spouse name: Not on file    Number of children: Not on file    Years of education: Not on file    Highest education level: Not on file   Occupational History    Not on file   Tobacco Use    Smoking status: Former Smoker     Packs/day: 0.50    Smokeless tobacco: Never Used   Substance and Sexual Activity    Alcohol use: Not Currently     Comment: socially    Drug use: No    Sexual activity: Not on file   Other Topics Concern   [address] Service Not Asked    Blood Transfusions Not Asked    Caffeine Concern Not Asked    Occupational Exposure Not Asked    Hobby Hazards Not Asked    Sleep Concern Not Asked    Stress Concern Not Asked    Weight Concern Not Asked    Special Diet Not Asked    Back Care Not Asked    Exercise Not Asked   [employer] Helmet Not Asked   [address] [facility] Not Asked    Self-Exams Not Asked   Social History Narrative    Not on file     Social Determinants of Health     Financial Resource Strain:     Difficulty of Paying Living Expenses: Not on file   Food Insecurity:     Worried About Running Out of Food in the Last Year: Not on file    Chase of Food in the Last Year: Not on file   Transportation Needs:     Lack of Transportation (Medical): Not on file    Lack of Transportation (Non-Medical): Not on file   Physical Activity:     Days of Exercise per Week: Not on file    Minutes of Exercise per Session: Not on file   Stress:     Feeling of Stress : Not on file   Social Connections:     Frequency of Communication with Friends and Family: Not on file    Frequency of Social Gatherings with Friends and Family: Not on file    Attends Religion Services: Not on file    Active Member of [address] [place] [address] [employer] or Organizations: Not on file    Attends Club or Organization Meetings: Not on file    Marital Status: Not on file   Intimate Partner Violence:     Fear of Current or Ex-Partner: Not on file    Emotionally Abused: Not on file    Physically Abused: Not on file    Sexually Abused: Not on file   Housing Stability:     Unable to Pay for Housing in the Last Year: Not on file    Number of [place] in the Last Year: Not on file    Unstable Housing in the Last Year: Not on file                ALLERGIES: Amlodipine, Percocet [oxycodone-acetaminophen], Sulfa (sulfonamide antibiotics), and Vicodin [hydrocodone-acetaminophen]    Review of Systems   Constitutional: Positive for fatigue. Negative for chills. HENT: Positive for congestion and rhinorrhea. Respiratory: Positive for cough, shortness of breath and wheezing. Musculoskeletal: Positive for myalgias. Neurological: Positive for headaches. All other systems reviewed and are negative.       Vitals:    01/19/22 1618   Pulse: 64   Resp: 18   Temp: 99 °F (37.2 °C)   SpO2: 96% Physical Exam  Vitals and nursing note reviewed. Constitutional:       General: She is not in acute distress. Appearance: She is not ill-appearing. Pulmonary:      Effort: Pulmonary effort is normal. No respiratory distress. Breath sounds: Decreased breath sounds and rhonchi present. No wheezing. MDM    Procedures      ICD-10-CM ICD-9-CM    1. Suspected COVID-19 virus infection  Z20.822 V01.79 NOVEL CORONAVIRUS (COVID-19)     Medications Ordered Today   Medications    benzonatate (TESSALON) 200 mg capsule     Sig: Take 1 Capsule by mouth three (3) times daily as needed for Cough for up to 7 days. Dispense:  21 Capsule     Refill:  0    promethazine-dextromethorphan (PROMETHAZINE-DM) 6.25-15 mg/5 mL syrup     Sig: Take 5 mL by mouth nightly as needed for Cough. Dispense:  60 mL     Refill:  0    methylPREDNISolone (Medrol, Dragan,) 4 mg tablet     Sig: As directed     Dispense:  1 Dose Pack     Refill:  0     No results found for any visits on 01/19/22. The patients condition was discussed with the patient and they understand. The patient is to follow up with primary care doctor. If signs and symptoms become worse the pt is to go to the ER. The patient is to take medications as prescribed.

## 2022-01-23 LAB — SARS-COV-2, NAA: DETECTED

## 2022-01-23 NOTE — PROGRESS NOTES
Patient Notified for positive Covid-19  Symptomatic Rx   Follow quarantine guideline as per CDC- 5 day isolation and if no symptom, resume normal activities with mask on   Notify contacts to be tested if symptomatic    Advised to follow with PCP and go to ED if worsen

## 2022-02-03 ENCOUNTER — OFFICE VISIT (OUTPATIENT)
Dept: CARDIOLOGY CLINIC | Age: 42
End: 2022-02-03

## 2022-02-03 VITALS
RESPIRATION RATE: 18 BRPM | BODY MASS INDEX: 34.93 KG/M2 | OXYGEN SATURATION: 98 % | DIASTOLIC BLOOD PRESSURE: 102 MMHG | SYSTOLIC BLOOD PRESSURE: 188 MMHG | WEIGHT: 185 LBS | HEART RATE: 81 BPM | HEIGHT: 61 IN

## 2022-02-03 DIAGNOSIS — F17.200 CURRENT SMOKER: ICD-10-CM

## 2022-02-03 DIAGNOSIS — R07.9 CHEST PAIN, UNSPECIFIED TYPE: Primary | ICD-10-CM

## 2022-02-03 DIAGNOSIS — I10 BENIGN ESSENTIAL HTN: ICD-10-CM

## 2022-02-03 DIAGNOSIS — G47.33 OSA (OBSTRUCTIVE SLEEP APNEA): ICD-10-CM

## 2022-02-03 DIAGNOSIS — E78.2 MIXED HYPERLIPIDEMIA: ICD-10-CM

## 2022-02-03 DIAGNOSIS — J45.909 UNCOMPLICATED ASTHMA, UNSPECIFIED ASTHMA SEVERITY, UNSPECIFIED WHETHER PERSISTENT: ICD-10-CM

## 2022-02-03 DIAGNOSIS — R94.31 ABNORMAL EKG: ICD-10-CM

## 2022-02-03 DIAGNOSIS — R06.02 SOB (SHORTNESS OF BREATH): ICD-10-CM

## 2022-02-03 DIAGNOSIS — E11.8 CONTROLLED TYPE 2 DIABETES MELLITUS WITH COMPLICATION, WITHOUT LONG-TERM CURRENT USE OF INSULIN (HCC): ICD-10-CM

## 2022-02-03 PROCEDURE — 93000 ELECTROCARDIOGRAM COMPLETE: CPT | Performed by: INTERNAL MEDICINE

## 2022-02-03 PROCEDURE — 99204 OFFICE O/P NEW MOD 45 MIN: CPT | Performed by: INTERNAL MEDICINE

## 2022-02-03 RX ORDER — OLMESARTAN MEDOXOMIL 20 MG/1
20 TABLET ORAL DAILY
Qty: 90 TABLET | Refills: 1 | Status: SHIPPED | OUTPATIENT
Start: 2022-02-03 | End: 2022-07-26

## 2022-02-03 RX ORDER — PANTOPRAZOLE SODIUM 40 MG/1
40 GRANULE, DELAYED RELEASE ORAL DAILY
COMMUNITY

## 2022-02-03 NOTE — PROGRESS NOTES
Chief Complaint   Patient presents with   [address] on Jan for Chest pain and SOB     1. Have you been to the ER, urgent care clinic since your last visit? Hospitalized since your last visit? Yes, [facility] ER-January 2021    2. Have you seen or consulted any other health care providers outside of the [address] [place] [address] [place] since your last visit? Include any pap smears or colon screening.  Yes, PCP- [facility]

## 2022-02-03 NOTE — LETTER
2/3/2022 2:45 PM    Patient: Tarik Kim   YOB: 1980  Date of Visit: 2/3/2022      Dear MD [address]  [address] [place] [address]  Via In [facility] [address]: Thank you for referring Ms. Joe Laura to me for evaluation/treatment. Below are the relevant portions of my assessment and plan of care. If you have questions, please do not hesitate to call me. I look forward to following Ms. Lamberto Frankiemeek along with you.         Sincerely,      Rosy Mcdonald MD

## 2022-02-03 NOTE — LETTER
2/3/2022    Patient: Hakan Reece   YOB: 1980   Date of Visit: 2/3/2022     Jorden Gordon, [facility] [address] [place] [address]  Via Fax: [phone]    Dear Jorden Gordon, ,      Thank you for referring Ms. Jorden Mcmanus to [address] [place] [address] for evaluation. My notes for this consultation are attached. If you have questions, please do not hesitate to call me. I look forward to following your patient along with you.       Sincerely,    Yodit Fofana MD

## 2022-02-16 ENCOUNTER — TELEPHONE (OUTPATIENT)
Dept: CARDIOLOGY CLINIC | Age: 42
End: 2022-02-16

## 2022-02-16 DIAGNOSIS — I10 BENIGN ESSENTIAL HTN: Primary | ICD-10-CM

## 2022-02-16 DIAGNOSIS — E78.2 MIXED HYPERLIPIDEMIA: ICD-10-CM

## 2022-02-16 DIAGNOSIS — I10 BENIGN ESSENTIAL HTN: ICD-10-CM

## 2022-02-16 DIAGNOSIS — E78.2 MIXED HYPERLIPIDEMIA: Primary | ICD-10-CM

## 2022-02-16 RX ORDER — SPIRONOLACTONE 50 MG/1
50 TABLET, FILM COATED ORAL DAILY
Qty: 30 TABLET | Refills: 1 | Status: CANCELLED | OUTPATIENT
Start: 2022-02-16

## 2022-02-16 RX ORDER — ATORVASTATIN CALCIUM 20 MG/1
20 TABLET, FILM COATED ORAL DAILY
Qty: 90 TABLET | Refills: 1 | Status: SHIPPED | OUTPATIENT
Start: 2022-02-16 | End: 2022-07-25

## 2022-02-16 RX ORDER — ATORVASTATIN CALCIUM 20 MG/1
20 TABLET, FILM COATED ORAL DAILY
COMMUNITY
End: 2022-02-16 | Stop reason: SDUPTHER

## 2022-02-16 RX ORDER — SPIRONOLACTONE 50 MG/1
50 TABLET, FILM COATED ORAL DAILY
Qty: 90 TABLET | Refills: 1 | Status: SHIPPED | OUTPATIENT
Start: 2022-02-16 | End: 2022-07-25

## 2022-02-16 RX ORDER — SPIRONOLACTONE 50 MG/1
50 TABLET, FILM COATED ORAL DAILY
COMMUNITY
End: 2022-02-16 | Stop reason: SDUPTHER

## 2022-02-16 RX ORDER — ATORVASTATIN CALCIUM 20 MG/1
20 TABLET, FILM COATED ORAL DAILY
Qty: 90 TABLET | Refills: 1 | Status: CANCELLED | OUTPATIENT
Start: 2022-02-16

## 2022-02-16 NOTE — TELEPHONE ENCOUNTER
Grace Please Advise     Pt is just wondering about how she should be taking all of her medications she states that she takes her labetalol BID in the morning and night and Olmesartan, HCTZ and Metformin in the morning she wants to know if it is ok for her to take all those at the same time and with being prescribed the new meds when would be the best time to take the new medications the statin and Aldactone. Please Advise Thank You!

## 2022-02-16 NOTE — TELEPHONE ENCOUNTER
----- Message from Gustabo Zarate NP sent at 2/16/2022  1:34 PM EST -----  DANIELA--just FYI---I responded to your message

## 2022-02-16 NOTE — TELEPHONE ENCOUNTER
----- Message from Gustabo Zarate NP sent at 2/16/2022 12:09 PM EST -----  LDL elevated, would benefit from statin---atorva 20 mg daily and repeat labs in 3 mos. Kidney fxn / live fxn stable. Re K level, it was called as critical value sent to me by Jerry Anderson, and I have already responded:  Starting aldactone 50 mg daily.   Repeat BMP in 3 weeks

## 2022-02-16 NOTE — TELEPHONE ENCOUNTER
---- Message from Logan Bunch NP sent at 2/16/2022  1:34 PM EST -----  DANIELA--just ÁNGEL---I responded to your message. Lethaniel Kayser has already notified pt.       Logan Bunch NP   2/16/2022 12:09 PM EST         LDL elevated, would benefit from statin---atorva 20 mg daily and repeat labs in 3 mos.  Kidney fxn / live fxn stable.  Re K level, it was called as critical value sent to me by Kim Perry, and I have already responded:  Starting aldactone 50 mg daily.  Repeat BMP in 3 weeks

## 2022-02-16 NOTE — TELEPHONE ENCOUNTER
Received call from Len Gilbert with [facility]. Verified patient with two patient identifiers. Reports K+ level from 2- as 2.7. Repeated value back to her. She verbalized understanding. Pls advise.

## 2022-02-16 NOTE — TELEPHONE ENCOUNTER
Norma Reid what about the statin wants to know the best time to take the statin? And I have sent both RX requests to you.

## 2022-02-16 NOTE — TELEPHONE ENCOUNTER
Spoke with patient. Verified with two patient identifiers. Advised pt per Jennifer Reid NP, the statin is typically taken at night but the Aldactone she can take in the morning. Patient verbalized understanding.

## 2022-02-16 NOTE — TELEPHONE ENCOUNTER
Spoke with patient. Verified with two patient identifiers. Advised pt per John Ramirez NP, LDL elevated, would benefit from statin---atorva 20 mg daily and repeat labs in 3 mos. Kidney fxn / live fxn stable. Re K level, it was called as critical value. If her BP > 140/85  add aldactone 50 mg daily. No need for K supplement yet.  I d/w doc beverley. Repeat BMP in 3 weeks. Pt agree's would like Statin and Aldactone sent to [facility]. Verified Address mailed lab slips to pt. Pt is just wondering about how she should be taking all of her medications she states that she takes her labetalol BID in the morning and night and Olmesartan, HCTZ and Metformin in the morning she wants to know if it is ok for her to take all those at the same time and with being prescribed the new meds when would be the best time to take the new medications the statin and Aldactone. Advised I would consult with NP and call back. Patient verbalized understanding.

## 2022-03-19 PROBLEM — I16.0 HYPERTENSIVE URGENCY: Status: ACTIVE | Noted: 2019-02-05

## 2022-07-25 RX ORDER — SPIRONOLACTONE 50 MG/1
TABLET, FILM COATED ORAL
Qty: 90 TABLET | Refills: 1 | Status: SHIPPED | OUTPATIENT
Start: 2022-07-25

## 2022-07-25 RX ORDER — ATORVASTATIN CALCIUM 20 MG/1
TABLET, FILM COATED ORAL
Qty: 90 TABLET | Refills: 1 | Status: SHIPPED | OUTPATIENT
Start: 2022-07-25

## 2022-07-26 RX ORDER — OLMESARTAN MEDOXOMIL 20 MG/1
20 TABLET ORAL DAILY
Qty: 90 TABLET | Refills: 2 | Status: SHIPPED | OUTPATIENT
Start: 2022-07-26

## 2022-07-26 NOTE — TELEPHONE ENCOUNTER
Refill Request Received for the Following Medication     Requested Prescriptions     Pending Prescriptions Disp Refills    olmesartan (BENICAR) 20 mg tablet [Pharmacy Med Name: OLMESARTAN 20 MG TAB[*]] 90 Tablet 1     Sig: TAKE ONE TABLET BY MOUTH ONE TIME DAILY       Last Prescribed: 02-    Last Appointment With Me:  2/3/2022     Future Appointments:  Future Appointments   Date Time Provider Melida Clifford   8/23/2022  9:20 AM Liberty Metcalf, MD [facility] AMB

## 2022-10-18 ENCOUNTER — TELEPHONE (OUTPATIENT)
Dept: CARDIOLOGY CLINIC | Age: 42
End: 2022-10-18

## 2022-10-18 DIAGNOSIS — E78.2 MIXED HYPERLIPIDEMIA: ICD-10-CM

## 2022-10-18 DIAGNOSIS — E87.6 HYPOKALEMIA: ICD-10-CM

## 2022-10-18 DIAGNOSIS — I10 BENIGN ESSENTIAL HTN: Primary | ICD-10-CM

## 2022-10-18 NOTE — TELEPHONE ENCOUNTER
Patient is calling because she is having muscle cramping and they are severe. Patient believes her potassium is low. She believe she can     Patient stated that she may call [facility] to come to her home to check her potassium levels.     Patient has been scheduled for 10/24/22 T 8:40 am.    [phone]

## 2022-10-19 ENCOUNTER — TELEPHONE (OUTPATIENT)
Dept: CARDIOLOGY CLINIC | Age: 42
End: 2022-10-19

## 2022-10-19 NOTE — PROGRESS NOTES
Liz Posey, [facility], [address]  [phone]     Subjective: Krzysztof Bobby is a 43 y.o. female is here for follow up. Pmhx HTN, HLD, Asthma / current smoker, GERD and WILLIAMS. No family hx CAD. Initially seen by us in 2/2022 after an ED visit  visit in 12/6/2021 for atypical cp, sob, increased urinary frequency and ocular pressure in the setting of HTNsive urgency, hypokalemia. At that visit, we ordered echo and stress test but neither has been done. Also started pt on Arb at that visit and then aldactone after getting labwork 2-3 week post visit      Today,   No further cp, has occasional sob. Still smoking. Also reports leg cramps,  most recent labwork showed normal electrolytes. the patient denies chest pain,  orthopnea, PND, LE edema, palpitations, syncope, or presyncope. Patient Active Problem List    Diagnosis Date Noted    Hypertensive urgency 02/05/2019      Unknown, Provider, MD  Past Medical History:   Diagnosis Date    Asthma     Diabetes ([facility] [address]     GERD (gastroesophageal reflux disease)     Hypertension       Past Surgical History:   Procedure Laterality Date    HX GYN      ovarian cyst     Allergies   Allergen Reactions    Amlodipine Other (comments)     Pt states it makes her BP higher    Percocet [Oxycodone-Acetaminophen] Itching     Denies this as an allergy. Sulfa (Sulfonamide Antibiotics) Itching    Vicodin [Hydrocodone-Acetaminophen] Itching      Family History   Problem Relation Age of Onset    Diabetes Mother     Asthma Father       Social History     Socioeconomic History    Marital status: LEGALLY      Spouse name: Not on file    Number of children: Not on file    Years of education: Not on file    Highest education level: Not on file   Occupational History    Not on file   Tobacco Use    Smoking status: Former     Packs/day: 0.50     Types: Cigarettes    Smokeless tobacco: Never   Substance and Sexual Activity    Alcohol use:  Yes Alcohol/week: 2.0 standard drinks     Types: 2 Cans of beer per week     Comment: week    Drug use: No    Sexual activity: Not on file   Other Topics Concern     Service Not Asked    Blood Transfusions Not Asked    Caffeine Concern Not Asked    Occupational Exposure Not Asked    Hobby Hazards Not Asked    Sleep Concern Not Asked    Stress Concern Not Asked    Weight Concern Not Asked    Special Diet Not Asked    Back Care Not Asked    Exercise Not Asked    Bike Helmet Not Asked    Seat Belt Not Asked    Self-Exams Not Asked   Social History Narrative    Not on file     Social Determinants of Health     Financial Resource Strain: Not on file   Food Insecurity: Not on file   Transportation Needs: Not on file   Physical Activity: Not on file   Stress: Not on file   Social Connections: Not on file   Intimate Partner Violence: Not on file   Housing Stability: Not on file      Current Outpatient Medications   Medication Sig    olmesartan (BENICAR) 20 mg tablet Take 1 Tablet by mouth in the morning. atorvastatin (LIPITOR) 20 mg tablet TAKE ONE TABLET BY MOUTH ONE TIME DAILY IN THE EVENING    spironolactone (ALDACTONE) 50 mg tablet TAKE ONE TABLET BY MOUTH ONE TIME DAILY    pantoprazole (PROTONIX) 40 mg granules for oral suspension 40 mg daily. buPROPion XL (WELLBUTRIN XL) 150 mg tablet Take 150 mg by mouth daily. hydroCHLOROthiazide (HYDRODIURIL) 25 mg tablet Take 25 mg by mouth daily. ibuprofen (MOTRIN) 800 mg tablet Take 800 mg by mouth every six to eight (6-8) hours as needed for Pain.    labetaloL (NORMODYNE) 200 mg tablet Take 200 mg by mouth two (2) times a day. cloNIDine HCL (CATAPRES) 0.2 mg tablet Take 1 Tablet by mouth every twelve (12) hours as needed (uncontrolled blood pressure). multivitamin (ONE A DAY) tablet Take 1 Tab by mouth daily. metFORMIN (GLUCOPHAGE) 850 mg tablet Take 1 Tab by mouth two (2) times daily (with meals).  (Patient taking differently: Take 500 mg by mouth two (2) times daily (with meals). )    cetirizine (ZYRTEC) 10 mg tablet Take 10 mg by mouth daily. montelukast (SINGULAIR) 10 mg tablet Take 10 mg by mouth daily. albuterol-ipratropium (DUO-NEB) 2.5 mg-0.5 mg/3 ml nebu 3 mL by Nebulization route every six (6) hours as needed. albuterol (PROAIR HFA) 90 mcg/actuation inhaler Take 2 Puffs by inhalation every four (4) hours as needed. No current facility-administered medications for this visit. Review of Symptoms:  11 systems reviewed, negative other than as stated in the HPI    Physical ExamPhysical Exam:    There were no vitals filed for this visit. There is no height or weight on file to calculate BMI. General PE  Gen:  NAD  Mental Status - Alert. General Appearance - Not in acute distress. HEENT:  PERRL, no carotid bruits or JVD  Chest and Lung Exam   Inspection: Accessory muscles - No use of accessory muscles in breathing. Auscultation:   Breath sounds: - Normal.   Cardiovascular   Inspection: Jugular vein - Bilateral - Inspection Normal.   Palpation/Percussion:   Apical Impulse: - Normal.   Auscultation: Rhythm - Regular. Heart Sounds - S1 WNL and S2 WNL. No S3 or S4. Murmurs & Other Heart Sounds: Auscultation of the heart reveals - No Murmurs. Peripheral Vascular   Upper Extremity: Inspection - Bilateral - No Cyanotic nailbeds or Digital clubbing. Lower Extremity:   Palpation: Edema - Bilateral - No edema. Abdomen:   Soft, non-tender, bowel sounds are active.   Neuro: A&O times 3, CN and motor grossly WNL    Labs:   Lab Results   Component Value Date/Time    Cholesterol, total 198 02/15/2022 11:30 AM    Cholesterol, total 162 02/06/2019 02:19 AM    HDL Cholesterol 32 02/15/2022 11:30 AM    HDL Cholesterol 33 02/06/2019 02:19 AM    LDL, calculated 135.2 (H) 02/15/2022 11:30 AM    LDL, calculated 108.2 (H) 02/06/2019 02:19 AM    Triglyceride 154 (H) 02/15/2022 11:30 AM    Triglyceride 104 02/06/2019 02:19 AM    CHOL/HDL Ratio 6.2 (H) 02/15/2022 11:30 AM    CHOL/HDL Ratio 4.9 02/06/2019 02:19 AM     Lab Results   Component Value Date/Time    CK 75 07/16/2019 02:28 AM     Lab Results   Component Value Date/Time    Sodium 137 02/15/2022 11:30 AM    Sodium 137 02/15/2022 11:30 AM    Potassium 2.7 (LL) 02/15/2022 11:30 AM    Potassium 2.7 (LL) 02/15/2022 11:30 AM    Chloride 100 02/15/2022 11:30 AM    Chloride 100 02/15/2022 11:30 AM    CO2 33 (H) 02/15/2022 11:30 AM    CO2 33 (H) 02/15/2022 11:30 AM    Anion gap 4 (L) 02/15/2022 11:30 AM    Anion gap 4 (L) 02/15/2022 11:30 AM    Glucose 111 (H) 02/15/2022 11:30 AM    Glucose 113 (H) 02/15/2022 11:30 AM    BUN 8 02/15/2022 11:30 AM    BUN 9 02/15/2022 11:30 AM    Creatinine 1.10 (H) 02/15/2022 11:30 AM    Creatinine 1.06 (H) 02/15/2022 11:30 AM    BUN/Creatinine ratio 7 (L) 02/15/2022 11:30 AM    BUN/Creatinine ratio 8 (L) 02/15/2022 11:30 AM    GFR est AA >60 02/15/2022 11:30 AM    GFR est AA >60 02/15/2022 11:30 AM    GFR est non-AA 55 (L) 02/15/2022 11:30 AM    GFR est non-AA 57 (L) 02/15/2022 11:30 AM    Calcium 9.1 02/15/2022 11:30 AM    Calcium 9.0 02/15/2022 11:30 AM    Bilirubin, total 0.5 02/15/2022 11:30 AM    Alk. phosphatase 74 02/15/2022 11:30 AM    Protein, total 7.3 02/15/2022 11:30 AM    Albumin 3.7 02/15/2022 11:30 AM    Globulin 3.6 02/15/2022 11:30 AM    A-G Ratio 1.0 (L) 02/15/2022 11:30 AM    ALT (SGPT) 22 02/15/2022 11:30 AM       EKG:  NSR       Assessment:          ICD-10-CM ICD-9-CM    1. SOB (shortness of breath)  R06.02 786.05       2. Benign essential HTN  I10 401.1       3. Mixed hyperlipidemia  E78.2 272.2       4. WILLIAMS (obstructive sleep apnea)  G47.33 327.23       5. Chest pain, unspecified type  R07.9 786.50       6. Current smoker  F17.200 305.1       7. Uncomplicated asthma, unspecified asthma severity, unspecified whether persistent  J45.909 493.90       8.  Controlled type 2 diabetes mellitus with complication, without long-term current use of insulin (HCC)  E11.8 250.90           No orders of the defined types were placed in this encounter. Plan:        Malignant essential HTN at a young age  Normal EF, LVH, grade 2 DD per echo in 2019  Controlled ---  Hctz 25 mg daily; Labetalol 200 mg BID; Olmesartan 20 mg daily, Aldatone 50 mg daily  Serum Cr 1.14 in 10/2022  Check echo  Noted renal evaluation by Dr. Taina Gibbons in 2019-abdominal MRI negative for renal artery stenosis, aldosterone normal, renin low, not consistent with primary hyperaldosteronism  Recall:  Dr Oneyda Henson discussed with Dr. Jennifer Chisholm no evidence of primary hyperaldosteronism, she may still benefit from spironolactone in the future  We will consider adding 50 mg daily at follow-up if blood pressure remains high  (started aldactone 2/15/2022)  Recall: amlodipine allergy-side effect listed as making blood pressure higher, and in my opinion this is a questionable reaction and could be reconsidered in the future     Mixed hyperlipidemia  10/2022 LDL 78 Continue atorva  2/2022  Started on Atorva 20 mg since  2/19  Labs and lipids per PCP      WILLIAMS (obstructive sleep apnea)  On PAP therapy     Uncomplicated asthma, unspecified asthma severity, unspecified whether persistent  Current less than 0.5 PPD smoker  Has inhaler  Tobacco cessation discussed with patient     GERD  On PPI     DM  On metformin     Hx hypokalemia 12/6/2021  K 2.9, was started on K supplement  Repeat lab 2/2022 with K at 2.7---started on aldactone with repeat BMP in 3 weeks but labwork not done  K at 4.0 in 10/2022    Counseled on diet and exercise- eventual goal of 30-60 minutes 5-7 times a week as per AHA guidelines. To start after testing is verified to be normal.      Patient was made aware during visit today that all testing completed would be instantaneously available on their MyChart for review. Discussed that these results will be made available to the provider at the same time.   They were advised to wait at least 3 business days to allow for provider's interpretation of results with follow-up before calling our office with concerns about their results.     RTC in 6 mos      Logan uBnch NP

## 2022-10-19 NOTE — TELEPHONE ENCOUNTER
----- Message from Joan Shea NP sent at 10/18/2022  9:47 PM EDT -----  Regarding: LAB WORK NEEDED  I ordered labwork for her to be done ASAP  because she has leg cramps etc

## 2022-10-24 ENCOUNTER — TELEPHONE (OUTPATIENT)
Dept: CARDIOLOGY CLINIC | Age: 42
End: 2022-10-24

## 2022-10-24 ENCOUNTER — OFFICE VISIT (OUTPATIENT)
Dept: CARDIOLOGY CLINIC | Age: 42
End: 2022-10-24
Payer: COMMERCIAL

## 2022-10-24 VITALS
BODY MASS INDEX: 35.3 KG/M2 | DIASTOLIC BLOOD PRESSURE: 70 MMHG | HEIGHT: 61 IN | SYSTOLIC BLOOD PRESSURE: 100 MMHG | OXYGEN SATURATION: 98 % | RESPIRATION RATE: 16 BRPM | WEIGHT: 187 LBS | HEART RATE: 73 BPM

## 2022-10-24 DIAGNOSIS — F17.200 CURRENT SMOKER: ICD-10-CM

## 2022-10-24 DIAGNOSIS — I10 BENIGN ESSENTIAL HTN: ICD-10-CM

## 2022-10-24 DIAGNOSIS — G47.33 OSA (OBSTRUCTIVE SLEEP APNEA): ICD-10-CM

## 2022-10-24 DIAGNOSIS — R06.02 SOB (SHORTNESS OF BREATH): Primary | ICD-10-CM

## 2022-10-24 DIAGNOSIS — R07.9 CHEST PAIN, UNSPECIFIED TYPE: ICD-10-CM

## 2022-10-24 DIAGNOSIS — E78.2 MIXED HYPERLIPIDEMIA: ICD-10-CM

## 2022-10-24 DIAGNOSIS — J45.909 UNCOMPLICATED ASTHMA, UNSPECIFIED ASTHMA SEVERITY, UNSPECIFIED WHETHER PERSISTENT: ICD-10-CM

## 2022-10-24 DIAGNOSIS — E11.8 CONTROLLED TYPE 2 DIABETES MELLITUS WITH COMPLICATION, WITHOUT LONG-TERM CURRENT USE OF INSULIN (HCC): ICD-10-CM

## 2022-10-24 PROCEDURE — 99214 OFFICE O/P EST MOD 30 MIN: CPT | Performed by: NURSE PRACTITIONER

## 2022-10-24 PROCEDURE — 93000 ELECTROCARDIOGRAM COMPLETE: CPT | Performed by: NURSE PRACTITIONER

## 2022-10-24 NOTE — TELEPHONE ENCOUNTER
----- Message from Gustabo Zarate NP sent at 10/22/2022  9:34 AM EDT -----  Labs stable---potassium normalized. Lipid at goal.  Continue same meds for now.  Will further discuss next week

## 2022-11-02 ENCOUNTER — TELEPHONE (OUTPATIENT)
Dept: INTERNAL MEDICINE CLINIC | Age: 42
End: 2022-11-02

## 2023-07-10 RX ORDER — SPIRONOLACTONE 50 MG/1
TABLET, FILM COATED ORAL
Qty: 60 TABLET | Refills: 0 | Status: SHIPPED | OUTPATIENT
Start: 2023-07-10

## 2023-07-10 NOTE — TELEPHONE ENCOUNTER
Refill Request Received for the Following Medication     Requested Prescriptions     Pending Prescriptions Disp Refills    spironolactone (ALDACTONE) 50 MG tablet [Pharmacy Med Name: SPIRONOLACTONE 50 MG TAB[!]] 90 tablet 1     Sig: TAKE ONE TABLET BY MOUTH ONE TIME DAILY       Last Prescribed:07-    Last Appointment With Me:  10/24/2022     Future Appointments:  No future appointments.

## 2023-09-06 RX ORDER — OLMESARTAN MEDOXOMIL 20 MG/1
TABLET ORAL
Qty: 90 TABLET | Refills: 0 | Status: SHIPPED | OUTPATIENT
Start: 2023-09-06

## 2023-09-08 ENCOUNTER — TELEPHONE (OUTPATIENT)
Age: 43
End: 2023-09-08

## 2023-09-08 NOTE — TELEPHONE ENCOUNTER
Pt would like clarification on whether she should still be taking atorvastatin 20mg.       Pt#   [phone]

## 2023-09-08 NOTE — TELEPHONE ENCOUNTER
Spoke with patient, verified with 2 identifiers. Patient stated that she is not taking Atorvastatin \" for a minute \" and she was bit sure if she needed  to take med  or not. Patient stated that she received a refill for medication from dr. Ailyn Frias. This nurse explained that per our last office notes back in October 2022 she was taking atorvastatin 20 mg/ day but, Lipids and medication is managed managed by PCP. Patient stated that PCP never told her stop but she got a\"brain fart\". This nurse recommended to call PCP's office. Patient voiced understanding.

## 2023-12-03 ENCOUNTER — HOSPITAL ENCOUNTER (EMERGENCY)
Facility: HOSPITAL | Age: 43
Discharge: HOME | End: 2023-12-03
Attending: EMERGENCY MEDICINE

## 2023-12-03 ENCOUNTER — APPOINTMENT (OUTPATIENT)
Facility: HOSPITAL | Age: 43
End: 2023-12-03

## 2023-12-03 VITALS
BODY MASS INDEX: 34.93 KG/M2 | RESPIRATION RATE: 18 BRPM | HEIGHT: 61 IN | WEIGHT: 185 LBS | OXYGEN SATURATION: 98 % | TEMPERATURE: 98.8 F | DIASTOLIC BLOOD PRESSURE: 83 MMHG | SYSTOLIC BLOOD PRESSURE: 123 MMHG | HEART RATE: 78 BPM

## 2023-12-03 DIAGNOSIS — M54.50 BILATERAL LOW BACK PAIN WITHOUT SCIATICA, UNSPECIFIED CHRONICITY: Primary | ICD-10-CM

## 2023-12-03 LAB
ALBUMIN SERPL-MCNC: 3.4 G/DL (ref 3.5–5)
ALBUMIN/GLOB SERPL: 0.9 (ref 1.1–2.2)
ALP SERPL-CCNC: 59 U/L (ref 45–117)
ALT SERPL-CCNC: 18 U/L (ref 12–78)
ANION GAP SERPL CALC-SCNC: 5 MMOL/L (ref 5–15)
APPEARANCE UR: ABNORMAL
AST SERPL-CCNC: 14 U/L (ref 15–37)
BACTERIA URNS QL MICRO: ABNORMAL /HPF
BASOPHILS # BLD: 0 K/UL (ref 0–0.1)
BASOPHILS NFR BLD: 0 % (ref 0–1)
BILIRUB SERPL-MCNC: 0.5 MG/DL (ref 0.2–1)
BILIRUB UR QL: NEGATIVE
BUN SERPL-MCNC: 12 MG/DL (ref 6–20)
BUN/CREAT SERPL: 11 (ref 12–20)
CALCIUM SERPL-MCNC: 8.9 MG/DL (ref 8.5–10.1)
CHLORIDE SERPL-SCNC: 106 MMOL/L (ref 97–108)
CO2 SERPL-SCNC: 29 MMOL/L (ref 21–32)
COLOR UR: ABNORMAL
COMMENT:: NORMAL
COMMENT:: NORMAL
CREAT SERPL-MCNC: 1.07 MG/DL (ref 0.55–1.02)
DIFFERENTIAL METHOD BLD: ABNORMAL
EOSINOPHIL # BLD: 0.2 K/UL (ref 0–0.4)
EOSINOPHIL NFR BLD: 3 % (ref 0–7)
EPITH CASTS URNS QL MICRO: ABNORMAL /LPF
ERYTHROCYTE [DISTWIDTH] IN BLOOD BY AUTOMATED COUNT: 12.5 % (ref 11.5–14.5)
GLOBULIN SER CALC-MCNC: 3.8 G/DL (ref 2–4)
GLUCOSE SERPL-MCNC: 153 MG/DL (ref 65–100)
GLUCOSE UR STRIP.AUTO-MCNC: NEGATIVE MG/DL
HCG UR QL: NEGATIVE
HCT VFR BLD AUTO: 34.7 % (ref 35–47)
HGB BLD-MCNC: 10.8 G/DL (ref 11.5–16)
HGB UR QL STRIP: NEGATIVE
IMM GRANULOCYTES # BLD AUTO: 0 K/UL (ref 0–0.04)
IMM GRANULOCYTES NFR BLD AUTO: 0 % (ref 0–0.5)
KETONES UR QL STRIP.AUTO: ABNORMAL MG/DL
LEUKOCYTE ESTERASE UR QL STRIP.AUTO: NEGATIVE
LIPASE SERPL-CCNC: 31 U/L (ref 13–75)
LYMPHOCYTES # BLD: 2.1 K/UL (ref 0.8–3.5)
LYMPHOCYTES NFR BLD: 24 % (ref 12–49)
MCH RBC QN AUTO: 27.2 PG (ref 26–34)
MCHC RBC AUTO-ENTMCNC: 31.1 G/DL (ref 30–36.5)
MCV RBC AUTO: 87.4 FL (ref 80–99)
MONOCYTES # BLD: 0.5 K/UL (ref 0–1)
MONOCYTES NFR BLD: 6 % (ref 5–13)
NEUTS SEG # BLD: 5.8 K/UL (ref 1.8–8)
NEUTS SEG NFR BLD: 67 % (ref 32–75)
NITRITE UR QL STRIP.AUTO: NEGATIVE
NRBC # BLD: 0 K/UL (ref 0–0.01)
NRBC BLD-RTO: 0 PER 100 WBC
PH UR STRIP: 8 (ref 5–8)
PLATELET # BLD AUTO: 273 K/UL (ref 150–400)
PMV BLD AUTO: 10.1 FL (ref 8.9–12.9)
POTASSIUM SERPL-SCNC: 3.2 MMOL/L (ref 3.5–5.1)
PROT SERPL-MCNC: 7.2 G/DL (ref 6.4–8.2)
PROT UR STRIP-MCNC: ABNORMAL MG/DL
RBC # BLD AUTO: 3.97 M/UL (ref 3.8–5.2)
RBC #/AREA URNS HPF: ABNORMAL /HPF (ref 0–5)
SODIUM SERPL-SCNC: 140 MMOL/L (ref 136–145)
SP GR UR REFRACTOMETRY: 1.02 (ref 1–1.03)
SPECIMEN HOLD: NORMAL
UROBILINOGEN UR QL STRIP.AUTO: 1 EU/DL (ref 0.2–1)
WBC # BLD AUTO: 8.7 K/UL (ref 3.6–11)
WBC URNS QL MICRO: ABNORMAL /HPF (ref 0–4)

## 2023-12-03 PROCEDURE — 81025 URINE PREGNANCY TEST: CPT

## 2023-12-03 PROCEDURE — 81001 URINALYSIS AUTO W/SCOPE: CPT

## 2023-12-03 PROCEDURE — 80053 COMPREHEN METABOLIC PANEL: CPT

## 2023-12-03 PROCEDURE — 2580000003 HC RX 258: Performed by: EMERGENCY MEDICINE

## 2023-12-03 PROCEDURE — 85025 COMPLETE CBC W/AUTO DIFF WBC: CPT

## 2023-12-03 PROCEDURE — 99284 EMERGENCY DEPT VISIT MOD MDM: CPT

## 2023-12-03 PROCEDURE — 74176 CT ABD & PELVIS W/O CONTRAST: CPT

## 2023-12-03 PROCEDURE — 6360000002 HC RX W HCPCS: Performed by: EMERGENCY MEDICINE

## 2023-12-03 PROCEDURE — 83690 ASSAY OF LIPASE: CPT

## 2023-12-03 PROCEDURE — 36415 COLL VENOUS BLD VENIPUNCTURE: CPT

## 2023-12-03 PROCEDURE — 96374 THER/PROPH/DIAG INJ IV PUSH: CPT

## 2023-12-03 RX ORDER — 0.9 % SODIUM CHLORIDE 0.9 %
1000 INTRAVENOUS SOLUTION INTRAVENOUS ONCE
Status: COMPLETED | OUTPATIENT
Start: 2023-12-03 | End: 2023-12-03

## 2023-12-03 RX ORDER — CYCLOBENZAPRINE HCL 10 MG
10 TABLET ORAL 3 TIMES DAILY PRN
Qty: 21 TABLET | Refills: 0 | Status: SHIPPED | OUTPATIENT
Start: 2023-12-03 | End: 2023-12-10

## 2023-12-03 RX ORDER — KETOROLAC TROMETHAMINE 30 MG/ML
15 INJECTION, SOLUTION INTRAMUSCULAR; INTRAVENOUS ONCE
Status: COMPLETED | OUTPATIENT
Start: 2023-12-03 | End: 2023-12-03

## 2023-12-03 RX ADMIN — SODIUM CHLORIDE 1000 ML: 9 INJECTION, SOLUTION INTRAVENOUS at 02:16

## 2023-12-03 RX ADMIN — KETOROLAC TROMETHAMINE 15 MG: 30 INJECTION, SOLUTION INTRAMUSCULAR; INTRAVENOUS at 02:16

## 2023-12-03 ASSESSMENT — PAIN - FUNCTIONAL ASSESSMENT: PAIN_FUNCTIONAL_ASSESSMENT: 0-10

## 2023-12-03 ASSESSMENT — PAIN DESCRIPTION - LOCATION: LOCATION: BACK

## 2023-12-03 ASSESSMENT — PAIN DESCRIPTION - ORIENTATION: ORIENTATION: LOWER

## 2023-12-03 ASSESSMENT — PAIN SCALES - GENERAL
PAINLEVEL_OUTOF10: 9
PAINLEVEL_OUTOF10: 9

## 2023-12-03 NOTE — ED TRIAGE NOTES
Pt arrived to ED via POV with CC of bilateral flank pain and cloudy urine. Pt states she was seen by her primary care for the same last week. Pt A&O, NAD.

## 2023-12-03 NOTE — ED PROVIDER NOTES
[facility] EMERGENCY DEP  EMERGENCY DEPARTMENT ENCOUNTER      Pt Name: Jovanni Felton  MRN: [id number]  [address] [facility] 1980  Date of evaluation: 12/3/2023  Provider: Mitch Collins MD      HISTORY OF PRESENT ILLNESS      HPI  79-year-old with past medical history of diabetes and hypertension presenting to the emergency department due to lower back, bilateral flank pain, and cloudy urine. She developed the symptoms about a week ago. She denies any exacerbating or alleviating factors regarding her back pain. No radiation apart from to her flanks. No dysuria, hematuria, or frequency. She followed up with her PCP and she had some blood work done but she is not sure what that showed. She does not believe she had a urinalysis done. No fevers or chills. No history of kidney stones. Nursing Notes were reviewed.     REVIEW OF SYSTEMS         Review of Systems  A complete review of systems was performed and all systems reviewed are negative less otherwise document in the HPI      PAST MEDICAL HISTORY     Past Medical History:   Diagnosis Date    Asthma     Diabetes ([address])     GERD (gastroesophageal reflux disease)     Hypertension          SURGICAL HISTORY       Past Surgical History:   Procedure Laterality Date    GYN      ovarian cyst         CURRENT MEDICATIONS       Previous Medications    ALBUTEROL SULFATE HFA (PROVENTIL;VENTOLIN;PROAIR) 108 (90 BASE) MCG/ACT INHALER    Inhale 2 puffs into the lungs every 4 hours as needed    ATORVASTATIN (LIPITOR) 20 MG TABLET    TAKE ONE TABLET BY MOUTH ONE TIME DAILY IN THE EVENING    BUPROPION (WELLBUTRIN XL) 150 MG EXTENDED RELEASE TABLET    Take 150 mg by mouth daily    CETIRIZINE (ZYRTEC) 10 MG TABLET    Take 10 mg by mouth daily    HYDROCHLOROTHIAZIDE (HYDRODIURIL) 25 MG TABLET    Take 25 mg by mouth daily    IBUPROFEN (ADVIL;MOTRIN) 800 MG TABLET    Take 800 mg by mouth    IPRATROPIUM-ALBUTEROL (DUONEB) 0.5-2.5 (3) MG/3ML SOLN NEBULIZER SOLUTION    Inhale 3 mLs into

## 2023-12-03 NOTE — DISCHARGE INSTRUCTIONS
Return to the emergency department with any new or worsening symptoms.   I recommend 600 mg of ibuprofen every 6 hours as needed for pain in addition to the muscle relaxers I am prescribing you

## 2024-03-27 NOTE — ED PROVIDER NOTES
Addended by: PRETTY SHEN on: 3/27/2024 08:51 AM     Modules accepted: Orders     EMERGENCY DEPARTMENT HISTORY AND PHYSICAL EXAM     ------------------------------------------------------------------------------------------------------  Please note that this dictation was completed with [employer], the [employer] voice recognition software. Quite often unanticipated grammatical, syntax, homophones, and other interpretive errors are inadvertently transcribed by the computer software. Please disregard these errors. Please excuse any errors that have escaped final proofreading.  -----------------------------------------------------------------------------------------------------------------    Date: 12/6/2021  Patient Name: Alexy Grissom    History of Presenting Illness     Chief Complaint   Patient presents with    Chest Pain     ED visit d/t chest pain / increased urinary freq / ocular pressure, bilat - onset of sxs, less than 12 hours ago - hx of DM / Asthma / HTN - speaking in full sentences at this time;;        History Provided By: Patient    HPI: Alexy Grissom is a 39 y.o. female, with significant pmhx of hypertension, asthma, diabetes, reflux, who presents via private vehicle to the ED with c/o chest tightness, urinary frequency and bilateral ocular pressure that has been ongoing throughout today. Notes that she checked her blood pressure at home and that it was running high. Reports being compliant with her previously prescribed blood pressure medication (metoprolol in HCTZ) that she takes in the morning. Denies visual disturbance with her associated bilateral eye pain that is aching/throbbing in nature. Notes that she intermittently has tingling sensation to her bilateral hands and feet is not present at time of my evaluation. Patient had originally presented to [facility] ER where she had blood work done and then subsequently request discharge prior to being seen and she had \"waited too long. \"  Pt also specifically denies any recent fevers, chills,  SOB, nausea, vomiting, diarrhea, abd pain, changes in BM, urinary sxs    PCP: Steph Logan NP    Social Hx: denies tobacco, denies EtOH, denies recreational/ Illicit Drugs     There are no other complaints, changes, or physical findings at this time. Allergies   Allergen Reactions    Amlodipine Other (comments)     Pt states it makes her BP higher    Percocet [Oxycodone-Acetaminophen] Itching     Denies this as an allergy.  Sulfa (Sulfonamide Antibiotics) Itching    Vicodin [Hydrocodone-Acetaminophen] Itching         Current Outpatient Medications   Medication Sig Dispense Refill    potassium chloride SR (K-TAB) 20 mEq tablet Take 1 Tablet by mouth two (2) times a day for 7 days. 14 Tablet 0    cloNIDine HCL (CATAPRES) 0.2 mg tablet Take 1 Tablet by mouth every twelve (12) hours as needed (uncontrolled blood pressure). 10 Tablet 0    buPROPion XL (WELLBUTRIN XL) 150 mg tablet Take 150 mg by mouth daily.  hydroCHLOROthiazide (HYDRODIURIL) 25 mg tablet Take 25 mg by mouth daily.  ibuprofen (MOTRIN) 800 mg tablet Take 800 mg by mouth every six to eight (6-8) hours as needed for Pain.  labetaloL (NORMODYNE) 200 mg tablet Take 200 mg by mouth two (2) times a day.  omeprazole (PRILOSEC) 20 mg capsule Take 20 mg by mouth daily.  multivitamin (ONE A DAY) tablet Take 1 Tab by mouth daily.  metFORMIN (GLUCOPHAGE) 850 mg tablet Take 1 Tab by mouth two (2) times daily (with meals). (Patient taking differently: Take 500 mg by mouth two (2) times daily (with meals). ) 60 Tab 0    cetirizine (ZYRTEC) 10 mg tablet Take 10 mg by mouth daily.  montelukast (SINGULAIR) 10 mg tablet Take 10 mg by mouth daily.  albuterol-ipratropium (DUO-NEB) 2.5 mg-0.5 mg/3 ml nebu 3 mL by Nebulization route every six (6) hours as needed.  albuterol (PROAIR HFA) 90 mcg/actuation inhaler Take 2 Puffs by inhalation every four (4) hours as needed.          Past History     Past Medical History:  Past Medical History: Diagnosis Date    Asthma     Diabetes ([facility] [address]     GERD (gastroesophageal reflux disease)     Hypertension        Past Surgical History:  Past Surgical History:   Procedure Laterality Date    HX GYN      ovarian cyst       Family History:  Family History   Problem Relation Age of Onset    Diabetes Mother     Asthma Father        Social History:  Social History     Tobacco Use    Smoking status: Former Smoker     Packs/day: 0.50    Smokeless tobacco: Never Used   Substance Use Topics    Alcohol use: Not Currently     Comment: socially    Drug use: No       Allergies: Allergies   Allergen Reactions    Amlodipine Other (comments)     Pt states it makes her BP higher    Percocet [Oxycodone-Acetaminophen] Itching     Denies this as an allergy.  Sulfa (Sulfonamide Antibiotics) Itching    Vicodin [Hydrocodone-Acetaminophen] Itching         Review of Systems   Review of Systems   Constitutional: Negative. Negative for fever. Eyes: Negative. Respiratory: Negative. Negative for shortness of breath. Cardiovascular: Negative for chest pain. Gastrointestinal: Negative for abdominal pain, nausea and vomiting. Endocrine: Negative. Genitourinary: Positive for frequency. Negative for difficulty urinating, dysuria and hematuria. Musculoskeletal: Negative. Skin: Negative. Neurological: Negative. Psychiatric/Behavioral: Negative for suicidal ideas. All other systems reviewed and are negative. Physical Exam   Physical Exam  Vitals and nursing note reviewed. Constitutional:       General: She is not in acute distress. Appearance: She is well-developed. She is not diaphoretic. HENT:      Head: Normocephalic and atraumatic. Nose: Nose normal.   Eyes:      General: No scleral icterus. Conjunctiva/sclera: Conjunctivae normal.   Neck:      Trachea: No tracheal deviation. Cardiovascular:      Rate and Rhythm: Normal rate and regular rhythm.       Heart sounds: Normal heart sounds. No murmur heard. No friction rub. Pulmonary:      Effort: Pulmonary effort is normal. No respiratory distress. Breath sounds: Normal breath sounds. No stridor. No wheezing or rales. Abdominal:      General: Bowel sounds are normal. There is no distension. Palpations: Abdomen is soft. Tenderness: There is no abdominal tenderness. There is no rebound. Musculoskeletal:         General: No tenderness. Normal range of motion. Cervical back: Normal range of motion. Skin:     General: Skin is warm and dry. Findings: No rash. Neurological:      Mental Status: She is alert and oriented to person, place, and time. Cranial Nerves: No cranial nerve deficit. Psychiatric:         Speech: Speech normal.         Behavior: Behavior normal.         Thought Content:  Thought content normal.         Judgment: Judgment normal.           Diagnostic Study Results     Labs -     Recent Results (from the past 12 hour(s))   EKG, 12 LEAD, INITIAL    Collection Time: 12/06/21  8:42 PM   Result Value Ref Range    Ventricular Rate 83 BPM    Atrial Rate 83 BPM    P-R Interval 198 ms    QRS Duration 92 ms    Q-T Interval 400 ms    QTC Calculation (Bezet) 470 ms    Calculated P Axis 48 degrees    Calculated R Axis 104 degrees    Calculated T Axis 36 degrees    Diagnosis       Normal sinus rhythm  Rightward axis  Borderline ECG  When compared with ECG of 03-JUL-2019 00:04,  No significant change was found     CBC WITH AUTOMATED DIFF    Collection Time: 12/06/21  8:52 PM   Result Value Ref Range    WBC 8.2 3.6 - 11.0 K/uL    RBC 4.27 3.80 - 5.20 M/uL    HGB 10.4 (L) 11.5 - 16.0 g/dL    HCT 34.4 (L) 35.0 - 47.0 %    MCV 80.6 80.0 - 99.0 FL    MCH 24.4 (L) 26.0 - 34.0 PG    MCHC 30.2 30.0 - 36.5 g/dL    RDW 16.1 (H) 11.5 - 14.5 %    PLATELET 920 669 - 118 K/uL    MPV 11.1 8.9 - 12.9 FL    NRBC 0.0 0  WBC    ABSOLUTE NRBC 0.00 0.00 - 0.01 K/uL    NEUTROPHILS 70 32 - 75 %    LYMPHOCYTES 23 12 - 49 %    MONOCYTES 5 5 - 13 %    EOSINOPHILS 2 0 - 7 %    BASOPHILS 0 0 - 1 %    IMMATURE GRANULOCYTES 0 0.0 - 0.5 %    ABS. NEUTROPHILS 5.7 1.8 - 8.0 K/UL    ABS. LYMPHOCYTES 1.9 0.8 - 3.5 K/UL    ABS. MONOCYTES 0.4 0.0 - 1.0 K/UL    ABS. EOSINOPHILS 0.2 0.0 - 0.4 K/UL    ABS. BASOPHILS 0.0 0.0 - 0.1 K/UL    ABS. IMM. GRANS. 0.0 0.00 - 0.04 K/UL    DF AUTOMATED     METABOLIC PANEL, COMPREHENSIVE    Collection Time: 12/06/21  8:52 PM   Result Value Ref Range    Sodium 140 136 - 145 mmol/L    Potassium 2.9 (L) 3.5 - 5.1 mmol/L    Chloride 107 97 - 108 mmol/L    CO2 29 21 - 32 mmol/L    Anion gap 4 (L) 5 - 15 mmol/L    Glucose 121 (H) 65 - 100 mg/dL    BUN 7 6 - 20 MG/DL    Creatinine 0.97 0.55 - 1.02 MG/DL    BUN/Creatinine ratio 7 (L) 12 - 20      GFR est AA >60 >60 ml/min/1.73m2    GFR est non-AA >60 >60 ml/min/1.73m2    Calcium 9.0 8.5 - 10.1 MG/DL    Bilirubin, total 0.4 0.2 - 1.0 MG/DL    ALT (SGPT) 21 12 - 78 U/L    AST (SGOT) 22 15 - 37 U/L    Alk. phosphatase 65 45 - 117 U/L    Protein, total 7.8 6.4 - 8.2 g/dL    Albumin 3.5 3.5 - 5.0 g/dL    Globulin 4.3 (H) 2.0 - 4.0 g/dL    A-G Ratio 0.8 (L) 1.1 - 2.2     TROPONIN-HIGH SENSITIVITY    Collection Time: 12/06/21  8:52 PM   Result Value Ref Range    Troponin-High Sensitivity 19 0 - 51 ng/L   SAMPLES BEING HELD    Collection Time: 12/06/21  8:52 PM   Result Value Ref Range    SAMPLES BEING HELD 1RED     COMMENT        Add-on orders for these samples will be processed based on acceptable specimen integrity and analyte stability, which may vary by analyte. MAGNESIUM    Collection Time: 12/06/21 11:19 PM   Result Value Ref Range    Magnesium 2.0 1.6 - 2.4 mg/dL       Radiologic Studies -   No orders to display     CT Results  (Last 48 hours)    None        CXR Results  (Last 48 hours)    None            Medical Decision Making   I am the first provider for this patient.     I reviewed the vital signs, available nursing notes, past medical history, past surgical history, family history and social history. Vital Signs-Reviewed the patient's vital signs. Patient Vitals for the past 12 hrs:   Temp Pulse Resp BP SpO2   12/07/21 0154 -- 75 -- (!) 178/109 --   12/07/21 0150 -- 70 20 (!) 179/106 98 %   12/07/21 0140 -- 72 27 (!) 200/118 100 %   12/07/21 0130 -- 71 22 (!) 219/129 99 %   12/07/21 0120 -- 71 27 (!) 221/116 99 %   12/07/21 0110 -- 71 24 (!) 222/128 99 %   12/07/21 0053 -- 77 -- (!) 212/111 --   12/07/21 0040 -- 75 16 (!) 212/111 100 %   12/07/21 0030 -- 74 19 (!) 198/123 99 %   12/07/21 0020 -- 76 16 (!) 204/124 99 %   12/07/21 0010 -- 73 21 (!) 203/124 98 %   12/07/21 0005 -- 79 18 (!) 203/147 98 %   12/07/21 0000 -- 78 23 (!) 215/132 100 %   12/06/21 2357 -- 97 -- (!) 215/132 --   12/06/21 2308 98.1 °F (36.7 °C) 80 18 (!) 223/121 100 %       Pulse Oximetry Analysis - 98% on RA Normal    Records Reviewed/Interpretted: Nursing Notes from triage and Old Medical Records, noting results from previous ER visit at [facility] earlier today    Provider Notes (Medical Decision Making):     DDX:  Essential hypertension, malignant hypertension, renal insufficiency, electrolyte derangement    Plan:  Review of labs from outside hospital, magnesium, antihypertensive    Impression:  Elevated blood pressure, hypokalemia    ED Course:   Initial assessment performed. The patients presenting problems have been discussed, and they are in agreement with the care plan formulated and outlined with them. I have encouraged them to ask questions as they arise throughout their visit. I reviewed our electronic medical record system for any past medical records that were available that may contribute to the patients current condition, the nursing notes and and vital signs from today's visit  Nursing notes will be reviewed as they become available in realtime while the pt has been in the ED.   Jennifer Nayak MD    HYPERTENSION COUNSELING:  Patient made aware of their elevated blood pressure and is instructed to follow up this week with their Primary Care or [facility] for a recheck (should they be discharged.) Patient is counseled regarding consequences of chronic, uncontrolled hypertension including kidney disease, heart disease, stroke or even death. Patient states their understanding          2:55 AM  Progress note:  Pt noted to be feeling better, ready for discharge. Discussed lab findings with pt, specifically noting improved blood pressure. Pt will follow up with primary care as instructed. All questions have been answered, pt voiced understanding and agreement with plan. Specific return precautions provided in addition to instructions for pt to return to the ED immediately should sx worsen at any time. Claudene Nanny, MD             Critical Care Time:     none      Diagnosis     Clinical Impression:   1. Hypokalemia    2. Primary hypertension        PLAN:  1. Current Discharge Medication List      START taking these medications    Details   potassium chloride SR (K-TAB) 20 mEq tablet Take 1 Tablet by mouth two (2) times a day for 7 days. Qty: 14 Tablet, Refills: 0  Start date: 12/7/2021, End date: 12/14/2021      cloNIDine HCL (CATAPRES) 0.2 mg tablet Take 1 Tablet by mouth every twelve (12) hours as needed (uncontrolled blood pressure). Qty: 10 Tablet, Refills: 0  Start date: 12/7/2021           2.    Follow-up Information     Follow up With Specialties Details Why Contact Info    Adrian Sepulveda NP Nurse Practitioner Schedule an appointment as soon as possible for a visit in 2 days  [address] [place] [address]  [phone]      Jose Botello  [facility] Vascular Surgery, Cardiology, Interventional Cardiology Schedule an appointment as soon as possible for a visit in 2 days for blood pressure management [address]  [address] [address] [phone]          Return to ED if worse     Disposition:    2:55 AM   The patient's results have been reviewed with family and/or caregiver. They verbally convey their understanding and agreement of the patient's signs, symptoms, diagnosis, treatment and prognosis and additionally agree to follow up as recommended in the discharge instructions or to return to the Emergency Room should the patient's condition change prior to their follow-up appointment. The family and/or caregiver verbally agrees with the care-plan and all of their questions have been answered. The discharge instructions have also been provided to the them with educational information regarding the patient's diagnosis as well a list of reasons why the patient would want to return to the ER prior to their follow-up appointment should their condition change.   Sulema Boothe MD

## 2024-05-23 ENCOUNTER — APPOINTMENT (OUTPATIENT)
Facility: HOSPITAL | Age: 44
End: 2024-05-23

## 2024-05-23 ENCOUNTER — HOSPITAL ENCOUNTER (EMERGENCY)
Facility: HOSPITAL | Age: 44
Discharge: HOME | End: 2024-05-23
Attending: EMERGENCY MEDICINE

## 2024-05-23 VITALS
OXYGEN SATURATION: 96 % | DIASTOLIC BLOOD PRESSURE: 88 MMHG | HEART RATE: 82 BPM | WEIGHT: 185 LBS | TEMPERATURE: 98.6 F | SYSTOLIC BLOOD PRESSURE: 122 MMHG | RESPIRATION RATE: 14 BRPM | HEIGHT: 61 IN | BODY MASS INDEX: 34.93 KG/M2

## 2024-05-23 DIAGNOSIS — K62.89 RECTAL OR ANAL PAIN: Primary | ICD-10-CM

## 2024-05-23 DIAGNOSIS — K59.00 CONSTIPATION, UNSPECIFIED CONSTIPATION TYPE: ICD-10-CM

## 2024-05-23 DIAGNOSIS — K64.9 HEMORRHOIDS, UNSPECIFIED HEMORRHOID TYPE: ICD-10-CM

## 2024-05-23 LAB
ALBUMIN SERPL-MCNC: 3.6 G/DL (ref 3.5–5)
ALBUMIN/GLOB SERPL: 0.9 (ref 1.1–2.2)
ALP SERPL-CCNC: 84 U/L (ref 45–117)
ALT SERPL-CCNC: 17 U/L (ref 12–78)
ANION GAP SERPL CALC-SCNC: 4 MMOL/L (ref 5–15)
APPEARANCE UR: ABNORMAL
AST SERPL-CCNC: 14 U/L (ref 15–37)
BACTERIA URNS QL MICRO: ABNORMAL /HPF
BASOPHILS # BLD: 0 K/UL (ref 0–0.1)
BASOPHILS NFR BLD: 0 % (ref 0–1)
BILIRUB SERPL-MCNC: 0.7 MG/DL (ref 0.2–1)
BILIRUB UR QL: NEGATIVE
BUN SERPL-MCNC: 9 MG/DL (ref 6–20)
BUN/CREAT SERPL: 7 (ref 12–20)
CALCIUM SERPL-MCNC: 9.6 MG/DL (ref 8.5–10.1)
CHLORIDE SERPL-SCNC: 102 MMOL/L (ref 97–108)
CO2 SERPL-SCNC: 27 MMOL/L (ref 21–32)
COLOR UR: ABNORMAL
CREAT SERPL-MCNC: 1.24 MG/DL (ref 0.55–1.02)
DIFFERENTIAL METHOD BLD: ABNORMAL
EOSINOPHIL # BLD: 0.1 K/UL (ref 0–0.4)
EOSINOPHIL NFR BLD: 2 % (ref 0–7)
EPITH CASTS URNS QL MICRO: ABNORMAL /LPF
ERYTHROCYTE [DISTWIDTH] IN BLOOD BY AUTOMATED COUNT: 12.2 % (ref 11.5–14.5)
GLOBULIN SER CALC-MCNC: 4.2 G/DL (ref 2–4)
GLUCOSE SERPL-MCNC: 163 MG/DL (ref 65–100)
GLUCOSE UR STRIP.AUTO-MCNC: NEGATIVE MG/DL
HCT VFR BLD AUTO: 36 % (ref 35–47)
HGB BLD-MCNC: 11.6 G/DL (ref 11.5–16)
HGB UR QL STRIP: NEGATIVE
HYALINE CASTS URNS QL MICRO: ABNORMAL /LPF (ref 0–5)
IMM GRANULOCYTES # BLD AUTO: 0 K/UL (ref 0–0.04)
IMM GRANULOCYTES NFR BLD AUTO: 0 % (ref 0–0.5)
KETONES UR QL STRIP.AUTO: ABNORMAL MG/DL
LEUKOCYTE ESTERASE UR QL STRIP.AUTO: ABNORMAL
LYMPHOCYTES # BLD: 0.9 K/UL (ref 0.8–3.5)
LYMPHOCYTES NFR BLD: 11 % (ref 12–49)
MCH RBC QN AUTO: 27.8 PG (ref 26–34)
MCHC RBC AUTO-ENTMCNC: 32.2 G/DL (ref 30–36.5)
MCV RBC AUTO: 86.1 FL (ref 80–99)
MONOCYTES # BLD: 0.3 K/UL (ref 0–1)
MONOCYTES NFR BLD: 4 % (ref 5–13)
NEUTS SEG # BLD: 6.7 K/UL (ref 1.8–8)
NEUTS SEG NFR BLD: 83 % (ref 32–75)
NITRITE UR QL STRIP.AUTO: NEGATIVE
NRBC # BLD: 0 K/UL (ref 0–0.01)
NRBC BLD-RTO: 0 PER 100 WBC
PH UR STRIP: 5 (ref 5–8)
PLATELET # BLD AUTO: 268 K/UL (ref 150–400)
PMV BLD AUTO: 9.3 FL (ref 8.9–12.9)
POTASSIUM SERPL-SCNC: 3.8 MMOL/L (ref 3.5–5.1)
PROT SERPL-MCNC: 7.8 G/DL (ref 6.4–8.2)
PROT UR STRIP-MCNC: NEGATIVE MG/DL
RBC # BLD AUTO: 4.18 M/UL (ref 3.8–5.2)
RBC #/AREA URNS HPF: ABNORMAL /HPF (ref 0–5)
SODIUM SERPL-SCNC: 133 MMOL/L (ref 136–145)
SP GR UR REFRACTOMETRY: 1.02 (ref 1–1.03)
SPECIMEN HOLD: NORMAL
UROBILINOGEN UR QL STRIP.AUTO: 0.2 EU/DL (ref 0.2–1)
WBC # BLD AUTO: 8.1 K/UL (ref 3.6–11)
WBC URNS QL MICRO: ABNORMAL /HPF (ref 0–4)

## 2024-05-23 PROCEDURE — 87088 URINE BACTERIA CULTURE: CPT

## 2024-05-23 PROCEDURE — 2580000003 HC RX 258: Performed by: EMERGENCY MEDICINE

## 2024-05-23 PROCEDURE — 85025 COMPLETE CBC W/AUTO DIFF WBC: CPT

## 2024-05-23 PROCEDURE — 36415 COLL VENOUS BLD VENIPUNCTURE: CPT

## 2024-05-23 PROCEDURE — 99284 EMERGENCY DEPT VISIT MOD MDM: CPT

## 2024-05-23 PROCEDURE — 87086 URINE CULTURE/COLONY COUNT: CPT

## 2024-05-23 PROCEDURE — 87186 SC STD MICRODIL/AGAR DIL: CPT

## 2024-05-23 PROCEDURE — 81001 URINALYSIS AUTO W/SCOPE: CPT

## 2024-05-23 PROCEDURE — 6370000000 HC RX 637 (ALT 250 FOR IP): Performed by: EMERGENCY MEDICINE

## 2024-05-23 PROCEDURE — 74019 RADEX ABDOMEN 2 VIEWS: CPT

## 2024-05-23 PROCEDURE — 80053 COMPREHEN METABOLIC PANEL: CPT

## 2024-05-23 RX ORDER — HYDROCORTISONE ACETATE 25 MG/1
25 SUPPOSITORY RECTAL
Status: COMPLETED | OUTPATIENT
Start: 2024-05-23 | End: 2024-05-23

## 2024-05-23 RX ORDER — POLYETHYLENE GLYCOL 3350 17 G/17G
17 POWDER, FOR SOLUTION ORAL DAILY PRN
Qty: 1530 G | Refills: 1 | Status: SHIPPED | OUTPATIENT
Start: 2024-05-23 | End: 2024-06-22

## 2024-05-23 RX ORDER — 0.9 % SODIUM CHLORIDE 0.9 %
1000 INTRAVENOUS SOLUTION INTRAVENOUS ONCE
Status: COMPLETED | OUTPATIENT
Start: 2024-05-23 | End: 2024-05-23

## 2024-05-23 RX ADMIN — MAGNESIUM HYDROXIDE 30 ML: 400 SUSPENSION ORAL at 16:10

## 2024-05-23 RX ADMIN — SODIUM CHLORIDE 1000 ML: 9 INJECTION, SOLUTION INTRAVENOUS at 16:10

## 2024-05-23 RX ADMIN — HYDROCORTISONE ACETATE 25 MG: 25 SUPPOSITORY RECTAL at 19:25

## 2024-05-23 ASSESSMENT — PAIN DESCRIPTION - LOCATION
LOCATION: ABDOMEN
LOCATION: RECTUM

## 2024-05-23 ASSESSMENT — ENCOUNTER SYMPTOMS
CONSTIPATION: 1
BACK PAIN: 0
COLOR CHANGE: 0
RECTAL PAIN: 1
COUGH: 0
TROUBLE SWALLOWING: 0

## 2024-05-23 ASSESSMENT — PAIN DESCRIPTION - PAIN TYPE
TYPE: ACUTE PAIN
TYPE: ACUTE PAIN

## 2024-05-23 ASSESSMENT — PAIN SCALES - GENERAL
PAINLEVEL_OUTOF10: 5
PAINLEVEL_OUTOF10: 10

## 2024-05-23 ASSESSMENT — PAIN - FUNCTIONAL ASSESSMENT
PAIN_FUNCTIONAL_ASSESSMENT: PREVENTS OR INTERFERES SOME ACTIVE ACTIVITIES AND ADLS
PAIN_FUNCTIONAL_ASSESSMENT: PREVENTS OR INTERFERES WITH MANY ACTIVE NOT PASSIVE ACTIVITIES

## 2024-05-23 ASSESSMENT — PAIN DESCRIPTION - ONSET
ONSET: ON-GOING
ONSET: SUDDEN

## 2024-05-23 ASSESSMENT — PAIN DESCRIPTION - FREQUENCY
FREQUENCY: CONTINUOUS
FREQUENCY: CONTINUOUS

## 2024-05-23 ASSESSMENT — PAIN DESCRIPTION - DESCRIPTORS
DESCRIPTORS: DISCOMFORT
DESCRIPTORS: ACHING;DISCOMFORT;THROBBING

## 2024-05-23 ASSESSMENT — PAIN DESCRIPTION - ORIENTATION
ORIENTATION: LOWER
ORIENTATION: OTHER (COMMENT)

## 2024-05-23 NOTE — ED TRIAGE NOTES
TRIAGE: Pt arrives via EMS from home with c/o constipation x2 days. Pt states she took a liquid enema today with no relief. Denies use of pain meds. Hx DM and HTN.

## 2024-05-23 NOTE — DISCHARGE INSTRUCTIONS
Please increase fiber in your diet through foods such as oatmeal, fruits and vegetables.  Take a fiber supplement daily such as metamucil or benefiber.

## 2024-05-23 NOTE — ED PROVIDER NOTES
[facility] EMERGENCY DEP  EMERGENCY DEPARTMENT ENCOUNTER      Pt Name: Reena Licea  MRN: [id number]  Birthdate 1980  Date of evaluation: 5/23/2024  Provider: JORGE Mcleod NP    CHIEF COMPLAINT       Chief Complaint   Patient presents with    Constipation         HISTORY OF PRESENT ILLNESS   (Location/Symptom, Timing/Onset, Context/Setting, Quality, Duration, Modifying Factors, Severity)  Note limiting factors.   HPI  Patient is a 43-year-old female with past medical history significant for GERD, hypertension, asthma and diabetes who presents to the ED via EMS with reports of constipation.  She states she has not had a bowel movement in 4 days.  She attempted enema this morning without relief of symptoms.Denies any fever, difficulty breathing, difficulty swallowing, SOB, chest pain or abdominal pain. Denies any nausea, vomiting, urinary symptoms or diarrhea.     Old charts reviewed  Review of External Medical Records:     Nursing Notes were reviewed.    REVIEW OF SYSTEMS    (2-9 systems for level 4, 10 or more for level 5)     Review of Systems   Constitutional:  Negative for activity change, appetite change, fever and unexpected weight change.   HENT:  Negative for congestion and trouble swallowing.    Respiratory:  Negative for cough.    Cardiovascular:  Negative for chest pain, palpitations and leg swelling.   Gastrointestinal:  Positive for constipation and rectal pain.   Genitourinary:  Negative for dysuria.   Musculoskeletal:  Negative for back pain and neck pain.   Skin:  Negative for color change, pallor, rash and wound.   Neurological:  Negative for dizziness and headaches.   All other systems reviewed and are negative.      Except as noted above the remainder of the review of systems was reviewed and negative.       PAST MEDICAL HISTORY     Past Medical History:   Diagnosis Date    Asthma     Diabetes (HCC)     GERD (gastroesophageal reflux disease)     Hypertension          SURGICAL HISTORY

## 2024-05-25 LAB
BACTERIA SPEC CULT: ABNORMAL
BACTERIA SPEC CULT: ABNORMAL
CC UR VC: ABNORMAL
SERVICE CMNT-IMP: ABNORMAL

## 2024-05-25 RX ORDER — METRONIDAZOLE 500 MG/1
500 TABLET ORAL 2 TIMES DAILY
Qty: 14 TABLET | Refills: 0 | Status: SHIPPED | OUTPATIENT
Start: 2024-05-25 | End: 2024-06-01

## 2024-05-25 RX ORDER — FLUCONAZOLE 150 MG/1
150 TABLET ORAL ONCE
Qty: 1 TABLET | Refills: 0 | Status: SHIPPED | OUTPATIENT
Start: 2024-05-25 | End: 2024-05-25

## 2024-05-25 RX ORDER — CEPHALEXIN 500 MG/1
500 CAPSULE ORAL 2 TIMES DAILY
Qty: 14 CAPSULE | Refills: 0 | Status: SHIPPED | OUTPATIENT
Start: 2024-05-25 | End: 2024-06-01